# Patient Record
Sex: FEMALE | Race: WHITE | NOT HISPANIC OR LATINO | Employment: FULL TIME | URBAN - METROPOLITAN AREA
[De-identification: names, ages, dates, MRNs, and addresses within clinical notes are randomized per-mention and may not be internally consistent; named-entity substitution may affect disease eponyms.]

---

## 2017-05-04 ENCOUNTER — TRANSFERRED RECORDS (OUTPATIENT)
Dept: HEALTH INFORMATION MANAGEMENT | Facility: CLINIC | Age: 22
End: 2017-05-04

## 2017-05-04 LAB — PAP SMEAR - HIM PATIENT REPORTED: NEGATIVE

## 2018-07-03 ENCOUNTER — PRENATAL OFFICE VISIT (OUTPATIENT)
Dept: OBGYN | Facility: CLINIC | Age: 23
End: 2018-07-03

## 2018-07-03 ENCOUNTER — APPOINTMENT (OUTPATIENT)
Dept: OBGYN | Facility: CLINIC | Age: 23
End: 2018-07-03
Payer: COMMERCIAL

## 2018-07-03 DIAGNOSIS — Z34.00 PRENATAL CARE, FIRST PREGNANCY: Primary | ICD-10-CM

## 2018-07-03 PROCEDURE — 99207 ZZC NO CHARGE NURSE ONLY: CPT | Performed by: OBSTETRICS & GYNECOLOGY

## 2018-07-03 RX ORDER — CETIRIZINE HYDROCHLORIDE 10 MG/1
10 TABLET ORAL DAILY
COMMUNITY
End: 2021-02-26

## 2018-07-03 RX ORDER — PRENATAL VIT/IRON FUM/FOLIC AC 27MG-0.8MG
1 TABLET ORAL DAILY
COMMUNITY
Start: 2018-06-26 | End: 2022-02-14

## 2018-07-03 NOTE — MR AVS SNAPSHOT
"              After Visit Summary   7/3/2018    Dalila Guevara    MRN: [id number]           Patient Information     Date Of Birth          1995        Visit Information        Provider Department      7/3/2018 6:11 PM Maida Fair MD [facility]        Today's Diagnoses     Prenatal care, first pregnancy    -  1       Follow-ups after your visit        Your next 10 appointments already scheduled     Aug 02, 2018  9:30 AM CDT   New Prenatal with Maida Fair MD, [facility] 2   [facility]    [address] [address]   [phone]              Who to contact     If you have questions or need follow up information about today's clinic visit or your schedule please contact [facility] directly at [phone].  Normal or non-critical lab and imaging results will be communicated to you by MyChart, letter or phone within 4 business days after the clinic has received the results. If you do not hear from us within 7 days, please contact the clinic through MyChart or phone. If you have a critical or abnormal lab result, we will notify you by phone as soon as possible.  Submit refill requests through Plasmon or call your pharmacy and they will forward the refill request to us. Please allow 3 business days for your refill to be completed.          Additional Information About Your Visit        MyChart Information     Plasmon lets you send messages to your doctor, view your test results, renew your prescriptions, schedule appointments and more. To sign up, go to [email or web address] . Click on \"Log in\" on the left side of the screen, which will take you to the Welcome page. Then click on \"Sign up Now\" on the right side of the page.     You will be asked to enter the access code listed below, as well as some personal information. Please follow the directions to create your username and password.     Your access code " is: [email or web address]  Expires: 10/1/2018  6:20 PM     Your access code will  in 90 days. If you need help or a new code, please call your [place] clinic or [phone].        Care EveryWhere ID     This is your Care EveryWhere ID. This could be used by other organizations to access your [place] medical records  [id number]        Your Vitals Were     Last Period                   2018            Blood Pressure from Last 3 Encounters:   No data found for BP    Weight from Last 3 Encounters:   No data found for Wt              Today, you had the following     No orders found for display       Primary Care Provider    None Specified       No primary provider on file.        Equal Access to Services     [facility]: Hadii tim Kline, carlos lindo, lubna garvinmagriffin forte, giancarlo childs . So [facility] [phone].    ATENCIÓN: Si habla español, tiene a tripathi disposición servicios gratuitos de asistencia lingüística. Llame al [phone].    We comply with applicable federal civil rights laws and [place] laws. We do not discriminate on the basis of race, color, national origin, age, disability, sex, sexual orientation, or gender identity.            Thank you!     Thank you for choosing [facility]  for your care. Our goal is always to provide you with excellent care. Hearing back from our patients is one way we can continue to improve our services. Please take a few minutes to complete the written survey that you may receive in the mail after your visit with us. Thank you!             Your Updated Medication List - Protect others around you: Learn how to safely use, store and throw away your medicines at www.disposemymeds.org.          This list is accurate as of 7/3/18  6:20 PM.  Always use your most recent med list.                   Brand Name Dispense Instructions for use Diagnosis    cetirizine 10 MG tablet    zyrTEC     Take 10 mg by mouth daily         prenatal multivitamin plus iron 27-0.8 MG Tabs per tablet      Take 1 tablet by mouth daily

## 2018-08-02 ENCOUNTER — PRENATAL OFFICE VISIT (OUTPATIENT)
Dept: OBGYN | Facility: CLINIC | Age: 23
End: 2018-08-02
Payer: COMMERCIAL

## 2018-08-02 VITALS
HEART RATE: 105 BPM | WEIGHT: 256.6 LBS | DIASTOLIC BLOOD PRESSURE: 84 MMHG | BODY MASS INDEX: 40.27 KG/M2 | TEMPERATURE: 99 F | HEIGHT: 67 IN | SYSTOLIC BLOOD PRESSURE: 127 MMHG | RESPIRATION RATE: 20 BRPM

## 2018-08-02 DIAGNOSIS — Z34.01 ENCOUNTER FOR PRENATAL CARE IN FIRST TRIMESTER OF FIRST PREGNANCY: Primary | ICD-10-CM

## 2018-08-02 LAB
ABO + RH BLD: NORMAL
ABO + RH BLD: NORMAL
ALBUMIN UR-MCNC: NEGATIVE MG/DL
APPEARANCE UR: CLEAR
BILIRUB UR QL STRIP: NEGATIVE
BLD GP AB SCN SERPL QL: NORMAL
BLOOD BANK CMNT PATIENT-IMP: NORMAL
COLOR UR AUTO: YELLOW
ERYTHROCYTE [DISTWIDTH] IN BLOOD BY AUTOMATED COUNT: 13 % (ref 10–15)
GLUCOSE UR STRIP-MCNC: NEGATIVE MG/DL
HBV SURFACE AG SERPL QL IA: NONREACTIVE
HCT VFR BLD AUTO: 40.5 % (ref 35–47)
HGB BLD-MCNC: 13.5 G/DL (ref 11.7–15.7)
HGB UR QL STRIP: NEGATIVE
HIV 1+2 AB+HIV1 P24 AG SERPL QL IA: NONREACTIVE
KETONES UR STRIP-MCNC: NEGATIVE MG/DL
LEUKOCYTE ESTERASE UR QL STRIP: NEGATIVE
MCH RBC QN AUTO: 29.9 PG (ref 26.5–33)
MCHC RBC AUTO-ENTMCNC: 33.3 G/DL (ref 31.5–36.5)
MCV RBC AUTO: 90 FL (ref 78–100)
NITRATE UR QL: NEGATIVE
PH UR STRIP: 6.5 PH (ref 5–7)
PLATELET # BLD AUTO: 275 10E9/L (ref 150–450)
RBC # BLD AUTO: 4.51 10E12/L (ref 3.8–5.2)
SOURCE: NORMAL
SP GR UR STRIP: 1.01 (ref 1–1.03)
SPECIMEN EXP DATE BLD: NORMAL
UROBILINOGEN UR STRIP-ACNC: 0.2 EU/DL (ref 0.2–1)
WBC # BLD AUTO: 9.2 10E9/L (ref 4–11)

## 2018-08-02 PROCEDURE — 86850 RBC ANTIBODY SCREEN: CPT | Performed by: OBSTETRICS & GYNECOLOGY

## 2018-08-02 PROCEDURE — 86900 BLOOD TYPING SEROLOGIC ABO: CPT | Performed by: OBSTETRICS & GYNECOLOGY

## 2018-08-02 PROCEDURE — 87624 HPV HI-RISK TYP POOLED RSLT: CPT | Performed by: OBSTETRICS & GYNECOLOGY

## 2018-08-02 PROCEDURE — 86780 TREPONEMA PALLIDUM: CPT | Performed by: OBSTETRICS & GYNECOLOGY

## 2018-08-02 PROCEDURE — 87340 HEPATITIS B SURFACE AG IA: CPT | Performed by: OBSTETRICS & GYNECOLOGY

## 2018-08-02 PROCEDURE — 85027 COMPLETE CBC AUTOMATED: CPT | Performed by: OBSTETRICS & GYNECOLOGY

## 2018-08-02 PROCEDURE — 36415 COLL VENOUS BLD VENIPUNCTURE: CPT | Performed by: OBSTETRICS & GYNECOLOGY

## 2018-08-02 PROCEDURE — G0123 SCREEN CERV/VAG THIN LAYER: HCPCS | Performed by: OBSTETRICS & GYNECOLOGY

## 2018-08-02 PROCEDURE — 87086 URINE CULTURE/COLONY COUNT: CPT | Performed by: OBSTETRICS & GYNECOLOGY

## 2018-08-02 PROCEDURE — 87389 HIV-1 AG W/HIV-1&-2 AB AG IA: CPT | Performed by: OBSTETRICS & GYNECOLOGY

## 2018-08-02 PROCEDURE — 86762 RUBELLA ANTIBODY: CPT | Performed by: OBSTETRICS & GYNECOLOGY

## 2018-08-02 PROCEDURE — 76817 TRANSVAGINAL US OBSTETRIC: CPT | Performed by: OBSTETRICS & GYNECOLOGY

## 2018-08-02 PROCEDURE — 99207 ZZC FIRST OB VISIT: CPT | Performed by: OBSTETRICS & GYNECOLOGY

## 2018-08-02 PROCEDURE — 86901 BLOOD TYPING SEROLOGIC RH(D): CPT | Performed by: OBSTETRICS & GYNECOLOGY

## 2018-08-02 PROCEDURE — 87591 N.GONORRHOEAE DNA AMP PROB: CPT | Performed by: OBSTETRICS & GYNECOLOGY

## 2018-08-02 PROCEDURE — 87491 CHLMYD TRACH DNA AMP PROBE: CPT | Performed by: OBSTETRICS & GYNECOLOGY

## 2018-08-02 PROCEDURE — 81003 URINALYSIS AUTO W/O SCOPE: CPT | Performed by: OBSTETRICS & GYNECOLOGY

## 2018-08-02 NOTE — MR AVS SNAPSHOT
"              After Visit Summary   8/2/2018    Dalila Galvez    MRN: [id number]           Patient Information     Date Of Birth          1995        Visit Information        Provider Department      8/2/2018 9:30 AM Maida Fair MD; [facility] 2 [facility]        Today's Diagnoses     Encounter for prenatal care in first trimester of first pregnancy    -  1       Follow-ups after your visit        Your next 10 appointments already scheduled     Sep 06, 2018  9:15 AM CDT   ESTABLISHED PRENATAL with Maida Fair MD   [facility]    [address] [address]   [phone]              Who to contact     If you have questions or need follow up information about today's clinic visit or your schedule please contact [facility] directly at [phone].  Normal or non-critical lab and imaging results will be communicated to you by MyChart, letter or phone within 4 business days after the clinic has received the results. If you do not hear from us within 7 days, please contact the clinic through MyChart or phone. If you have a critical or abnormal lab result, we will notify you by phone as soon as possible.  Submit refill requests through Tenfoot or call your pharmacy and they will forward the refill request to us. Please allow 3 business days for your refill to be completed.          Additional Information About Your Visit        MyChart Information     Tenfoot lets you send messages to your doctor, view your test results, renew your prescriptions, schedule appointments and more. To sign up, go to [email or web address] . Click on \"Log in\" on the left side of the screen, which will take you to the Welcome page. Then click on \"Sign up Now\" on the right side of the page.     You will be asked to enter the access code listed below, as well as some personal information. Please follow the directions to create " "your username and password.     Your access code is: [email or web address]  Expires: 10/1/2018  6:20 PM     Your access code will  in 90 days. If you need help or a new code, please call your [place] clinic or [phone].        Care EveryWhere ID     This is your Care EveryWhere ID. This could be used by other organizations to access your [place] medical records  [id number]        Your Vitals Were     Pulse Temperature Respirations Height Last Period Breastfeeding?    105 99  F (37.2  C) (Tympanic) 20 5' 7.25\" (1.708 m) 2018 No    BMI (Body Mass Index)                   39.89 kg/m2            Blood Pressure from Last 3 Encounters:   18 127/84    Weight from Last 3 Encounters:   18 256 lb 9.6 oz (116.4 kg)              We Performed the Following     *UA reflex to Microscopic     ABO/Rh type and screen     CBC with platelets     Chlamydia trachomatis PCR     Hepatitis B surface antigen     HIV Antigen Antibody Combo     Neisseria gonorrhoeae PCR     Pap imaged thin layer screen with HPV - recommended age 30 - 65 years (select HPV order below)     Rubella Antibody IgG Quantitative     Treponema Abs w Reflex to RPR and Titer     Urine Culture Aerobic Bacterial     US OB <14 Weeks w Transvaginal Single        Primary Care Provider Fax #    Physician No Ref-Primary [phone]       No address on file        Equal Access to Services     YOMI ROSEN : Hadii aad ku hadasho Soomaali, waaxda luqadaha, qaybta kaalmada adeegyada, giancarlo garcia. So [facility] [phone].    ATENCIÓN: Si habla español, tiene a tripathi disposición servicios gratuitos de asistencia lingüística. Llame al [phone].    We comply with applicable federal civil rights laws and [place] laws. We do not discriminate on the basis of race, color, national origin, age, disability, sex, sexual orientation, or gender identity.            Thank you!     Thank you for choosing [facility]  for your care. " Our goal is always to provide you with excellent care. Hearing back from our patients is one way we can continue to improve our services. Please take a few minutes to complete the written survey that you may receive in the mail after your visit with us. Thank you!             Your Updated Medication List - Protect others around you: Learn how to safely use, store and throw away your medicines at www.disposemymeds.org.          This list is accurate as of 8/2/18 10:00 AM.  Always use your most recent med list.                   Brand Name Dispense Instructions for use Diagnosis    cetirizine 10 MG tablet    zyrTEC     Take 10 mg by mouth daily        prenatal multivitamin plus iron 27-0.8 MG Tabs per tablet      Take 1 tablet by mouth daily

## 2018-08-02 NOTE — NURSING NOTE
"Initial /84 (BP Location: Left arm, Patient Position: Chair, Cuff Size: Adult Large)  Pulse 105  Temp 99  F (37.2  C) (Tympanic)  Resp 20  Ht 5' 7.25\" (1.708 m)  Wt 256 lb 9.6 oz (116.4 kg)  LMP 06/02/2018  Breastfeeding? No  BMI 39.89 kg/m2 Estimated body mass index is 39.89 kg/(m^2) as calculated from the following:    Height as of this encounter: 5' 7.25\" (1.708 m).    Weight as of this encounter: 256 lb 9.6 oz (116.4 kg). .    Sommer Saini, [facility]    "

## 2018-08-02 NOTE — PROGRESS NOTES
"Dalila is a 23 year old  @ 8.5 weeks here for new ob visit.  Planned pregnancy.  No concerns.    FOB (): Shravan      ROS: Ten point review of systems was reviewed and negative except the above.  Current Issues include: nausea, mild  fatigue    OBhx: never pregnant  Gyne: Pap smears Normal  history of STD No STD history  Past Medical History:   Diagnosis Date     Chickenpox      Past Surgical History:   Procedure Laterality Date     NO HISTORY OF SURGERY       Patient Active Problem List    Diagnosis Date Noted     BMI 39.0-39.9,adult 2018     Priority: Medium     Prenatal care, first pregnancy 2018     Priority: Medium      No Known Allergies    Current Outpatient Prescriptions on File Prior to Visit:  cetirizine (ZYRTEC) 10 MG tablet Take 10 mg by mouth daily   Prenatal Vit-Fe Fumarate-FA (PRENATAL MULTIVITAMIN PLUS IRON) 27-0.8 MG TABS per tablet Take 1 tablet by mouth daily     No current facility-administered medications on file prior to visit.     Past Medical History of Father of Baby:   No significant medical history    Physical Exam: /84 (BP Location: Left arm, Patient Position: Chair, Cuff Size: Adult Large)  Pulse 105  Temp 99  F (37.2  C) (Tympanic)  Resp 20  Ht 5' 7.25\" (1.708 m)  Wt 256 lb 9.6 oz (116.4 kg)  LMP 2018  Breastfeeding? No  BMI 39.89 kg/m2  General: Well developed, well nourished female and Obese  Skin: Normal  HEENT: Normal  Neck: Supple,no adenopathy,thyroid normal  Chest: Clear  Heart: Regular rate, rhythm,No murmur, rub, gallop  Breasts: Not examined   Abdomen: Benign,Soft, flat, non-tender,No masses, organomegaly,No inguinal nodes,Bowel sounds normoactive   Extremities: Normal  Neurological: Normal   Perineum: Normal   Vulva: Normal  Vagina: Normal mucosa, no discharge  Cervix: Nulliparous, closed, mobile,  no discharge  Uterus: 6-8 weeks, Normal shape, position and consistency   Adnexa: Not palpable  Rectum: deferred, Normal without lesion or " mass   Bony Pelvis: Adequate     Transvaginal ultrasound was performed.  A viable intrauterine pregnancy was seen.  CRL consistent with 8 weeks, 6 days.  Fetal heart motion was visualized.    EDC by LMP: 3/9/19   EDC by sono:  3/8/19  Final EDC: 3/9/19    A/P 23 year old  at  8.5 weeks    1. Discussed physician coverage, tertiary support, diet, exercise, weight gain, schedule of visits, routine and indicated ultrasounds, and childbirth education.    2. Options for  testing for chromosomal and birth defects were discussed with the patient including nuchal lucency/blood marker testing in the first trimester and quad screening and/or Level 2 ultrasound in the second trimester.  We discussed that these are screening tests and not diagnostic tests and that false positives and negatives are a distinct possibility.  We discussed that follow up diagnostic testing would include chorionic villus sampling or amniocentesis depending on gestational age.    3. Prenatal labs, pap, GC, Chlamydia    4. Prenatal Vitamins    Maida Fair M.D.

## 2018-08-02 NOTE — LETTER
August 9, 2018    Dalila Galvez  [address]   [place] [address]    Dear Dalila,  We are happy to inform you that your PAP smear result from 8/2/18 is normal.  We are now able to do a follow up test on PAP smears. The DNA test is for HPV (Human Papilloma Virus). Cervical cancer is closely linked with certain types of HPV. Your results showed no evidence of high risk HPV.  Therefore we recommend you return in 3 years for your next pap smear.  You will still need to return to the clinic every year for an annual exam and other preventive tests.  Please contact the clinic at [phone] with any questions.  Sincerely,    Maida Fair MD/aftab

## 2018-08-03 LAB
BACTERIA SPEC CULT: NORMAL
C TRACH DNA SPEC QL NAA+PROBE: NEGATIVE
Lab: NORMAL
N GONORRHOEA DNA SPEC QL NAA+PROBE: NEGATIVE
RUBV IGG SERPL IA-ACNC: 141 IU/ML
SPECIMEN SOURCE: NORMAL
T PALLIDUM AB SER QL: NONREACTIVE

## 2018-08-06 LAB
COPATH REPORT: NORMAL
PAP: NORMAL

## 2018-08-07 ENCOUNTER — TELEPHONE (OUTPATIENT)
Dept: OBGYN | Facility: CLINIC | Age: 23
End: 2018-08-07

## 2018-08-08 LAB
FINAL DIAGNOSIS: NORMAL
HPV HR 12 DNA CVX QL NAA+PROBE: NEGATIVE
HPV16 DNA SPEC QL NAA+PROBE: NEGATIVE
HPV18 DNA SPEC QL NAA+PROBE: NEGATIVE
SPECIMEN DESCRIPTION: NORMAL
SPECIMEN SOURCE CVX/VAG CYTO: NORMAL

## 2018-09-06 ENCOUNTER — PRENATAL OFFICE VISIT (OUTPATIENT)
Dept: OBGYN | Facility: CLINIC | Age: 23
End: 2018-09-06
Payer: COMMERCIAL

## 2018-09-06 VITALS
RESPIRATION RATE: 18 BRPM | HEIGHT: 67 IN | BODY MASS INDEX: 39.87 KG/M2 | TEMPERATURE: 98.5 F | SYSTOLIC BLOOD PRESSURE: 120 MMHG | HEART RATE: 98 BPM | WEIGHT: 254 LBS | DIASTOLIC BLOOD PRESSURE: 81 MMHG

## 2018-09-06 DIAGNOSIS — O26.899 RH NEGATIVE, ANTEPARTUM: ICD-10-CM

## 2018-09-06 DIAGNOSIS — R30.0 DYSURIA: ICD-10-CM

## 2018-09-06 DIAGNOSIS — Z34.02 ENCOUNTER FOR PRENATAL CARE IN SECOND TRIMESTER OF FIRST PREGNANCY: Primary | ICD-10-CM

## 2018-09-06 DIAGNOSIS — N89.8 VAGINAL ITCHING: ICD-10-CM

## 2018-09-06 DIAGNOSIS — Z67.91 RH NEGATIVE, ANTEPARTUM: ICD-10-CM

## 2018-09-06 LAB
ALBUMIN UR-MCNC: NEGATIVE MG/DL
APPEARANCE UR: CLEAR
BACTERIA #/AREA URNS HPF: ABNORMAL /HPF
BILIRUB UR QL STRIP: NEGATIVE
COLOR UR AUTO: YELLOW
GLUCOSE UR STRIP-MCNC: NEGATIVE MG/DL
HGB UR QL STRIP: NEGATIVE
KETONES UR STRIP-MCNC: NEGATIVE MG/DL
LEUKOCYTE ESTERASE UR QL STRIP: ABNORMAL
MUCOUS THREADS #/AREA URNS LPF: PRESENT /LPF
NITRATE UR QL: NEGATIVE
NON-SQ EPI CELLS #/AREA URNS LPF: ABNORMAL /LPF
PH UR STRIP: 6 PH (ref 5–7)
RBC #/AREA URNS AUTO: ABNORMAL /HPF
SOURCE: ABNORMAL
SP GR UR STRIP: >1.03 (ref 1–1.03)
SPECIMEN SOURCE: ABNORMAL
UROBILINOGEN UR STRIP-ACNC: 0.2 EU/DL (ref 0.2–1)
WBC #/AREA URNS AUTO: ABNORMAL /HPF
WET PREP SPEC: ABNORMAL

## 2018-09-06 PROCEDURE — 81001 URINALYSIS AUTO W/SCOPE: CPT | Performed by: OBSTETRICS & GYNECOLOGY

## 2018-09-06 PROCEDURE — 99213 OFFICE O/P EST LOW 20 MIN: CPT | Performed by: OBSTETRICS & GYNECOLOGY

## 2018-09-06 PROCEDURE — 87210 SMEAR WET MOUNT SALINE/INK: CPT | Performed by: OBSTETRICS & GYNECOLOGY

## 2018-09-06 PROCEDURE — 87086 URINE CULTURE/COLONY COUNT: CPT | Performed by: OBSTETRICS & GYNECOLOGY

## 2018-09-06 RX ORDER — NITROFURANTOIN 25; 75 MG/1; MG/1
100 CAPSULE ORAL 2 TIMES DAILY
Qty: 14 CAPSULE | Refills: 0 | Status: SHIPPED | OUTPATIENT
Start: 2018-09-06 | End: 2018-10-04

## 2018-09-06 RX ORDER — MICONAZOLE NITRATE 2 %
1 CREAM WITH APPLICATOR VAGINAL AT BEDTIME
Qty: 45 G | Refills: 0 | Status: SHIPPED | OUTPATIENT
Start: 2018-09-06 | End: 2019-02-06

## 2018-09-06 RX ORDER — METRONIDAZOLE 500 MG/1
500 TABLET ORAL 2 TIMES DAILY
Qty: 14 TABLET | Refills: 0 | Status: SHIPPED | OUTPATIENT
Start: 2018-09-06 | End: 2018-10-04

## 2018-09-06 NOTE — MR AVS SNAPSHOT
"              After Visit Summary   9/6/2018    Dalila Galvez    MRN: [id number]           Patient Information     Date Of Birth          1995        Visit Information        Provider Department      9/6/2018 9:15 AM Maida Fair MD [facility]        Today's Diagnoses     Encounter for prenatal care in second trimester of first pregnancy    -  1    Rh negative, antepartum        Vaginal itching        Dysuria           Follow-ups after your visit        Who to contact     If you have questions or need follow up information about today's clinic visit or your schedule please contact [facility] directly at [phone].  Normal or non-critical lab and imaging results will be communicated to you by MyChart, letter or phone within 4 business days after the clinic has received the results. If you do not hear from us within 7 days, please contact the clinic through Quut or phone. If you have a critical or abnormal lab result, we will notify you by phone as soon as possible.  Submit refill requests through [employer] Corner or call your pharmacy and they will forward the refill request to us. Please allow 3 business days for your refill to be completed.          Additional Information About Your Visit        MyChart Information     [employer] Corner gives you secure access to your electronic health record. If you see a primary care provider, you can also send messages to your care team and make appointments. If you have questions, please call your primary care clinic.  If you do not have a primary care provider, please call [phone] and they will assist you.        Care EveryWhere ID     This is your Care EveryWhere ID. This could be used by other organizations to access your [place] medical records  [id number]        Your Vitals Were     Pulse Temperature Respirations Height Last Period Breastfeeding?    98 98.5  F (36.9  C) (Tympanic) 18 5' 7.25\" (1.708 m) 06/02/2018 No    BMI (Body Mass " Index)                   39.49 kg/m2            Blood Pressure from Last 3 Encounters:   09/06/18 120/81   08/02/18 127/84    Weight from Last 3 Encounters:   09/06/18 254 lb (115.2 kg)   08/02/18 256 lb 9.6 oz (116.4 kg)              We Performed the Following     UA with Microscopic     Urine Culture Aerobic Bacterial     Wet prep        Primary Care Provider Fax #    Physician No Ref-Primary [phone]       No address on file        Equal Access to Services     YOMI ROSEN : Hadii aad ku hadasho Soomaali, waaxda luqadaha, qaybta kaalmada adeegyada, waxay darvinin haykaten itzel childs . So [facility] [phone].    ATENCIÓN: Si habla español, tiene a tripathi disposición servicios gratuitos de asistencia lingüística. LlOhio[facility] [phone].    We comply with applicable federal civil rights laws and [place] laws. We do not discriminate on the basis of race, color, national origin, age, disability, sex, sexual orientation, or gender identity.            Thank you!     Thank you for choosing [facility]  for your care. Our goal is always to provide you with excellent care. Hearing back from our patients is one way we can continue to improve our services. Please take a few minutes to complete the written survey that you may receive in the mail after your visit with us. Thank you!             Your Updated Medication List - Protect others around you: Learn how to safely use, store and throw away your medicines at www.disposemymeds.org.          This list is accurate as of 9/6/18  9:37 AM.  Always use your most recent med list.                   Brand Name Dispense Instructions for use Diagnosis    cetirizine 10 MG tablet    zyrTEC     Take 10 mg by mouth daily        prenatal multivitamin plus iron 27-0.8 MG Tabs per tablet      Take 1 tablet by mouth daily

## 2018-09-06 NOTE — NURSING NOTE
"Initial /81 (BP Location: Left arm, Patient Position: Chair, Cuff Size: Adult Large)  Pulse 98  Temp 98.5  F (36.9  C) (Tympanic)  Resp 18  Ht 5' 7.25\" (1.708 m)  Wt 254 lb (115.2 kg)  LMP 06/02/2018  Breastfeeding? No  BMI 39.49 kg/m2 Estimated body mass index is 39.49 kg/(m^2) as calculated from the following:    Height as of this encounter: 5' 7.25\" (1.708 m).    Weight as of this encounter: 254 lb (115.2 kg). .    Sommer Saini, DERRICK    "

## 2018-09-06 NOTE — PROGRESS NOTES
"CC: Here for routine prenatal visit @ 13w5d   HPI: no cramping or bleeding.  Notes some burning and itching with urination    PE: /81 (BP Location: Left arm, Patient Position: Chair, Cuff Size: Adult Large)  Pulse 98  Temp 98.5  F (36.9  C) (Tympanic)  Resp 18  Ht 5' 7.25\" (1.708 m)  Wt 254 lb (115.2 kg)  LMP 06/02/2018  Breastfeeding? No  BMI 39.49 kg/m2   See OB flowsheet    Labs WNL  Rh negative    A/P G1 @ 13w5d normal pregnancy    1. Routine prenatal care.  Reviewed need for Rhogam mid-pregnancy  2. Dysuria, vaginal itching: will check wet prep and UA C&S    RTC 4 weeks.      Maida Fair M.D.    "

## 2018-09-06 NOTE — PROGRESS NOTES
Pt notified of below.  Pt reports understanding.  Pt does not have further questions or concerns.  Prescription's to [facility] in [place].    Laura Jeronimo   Ob/Gyn Clinic  RN

## 2018-09-07 LAB
BACTERIA SPEC CULT: NO GROWTH
Lab: NORMAL
SPECIMEN SOURCE: NORMAL

## 2018-10-04 ENCOUNTER — PRENATAL OFFICE VISIT (OUTPATIENT)
Dept: OBGYN | Facility: CLINIC | Age: 23
End: 2018-10-04
Payer: COMMERCIAL

## 2018-10-04 VITALS
HEIGHT: 67 IN | RESPIRATION RATE: 18 BRPM | DIASTOLIC BLOOD PRESSURE: 73 MMHG | TEMPERATURE: 98.9 F | WEIGHT: 255.2 LBS | SYSTOLIC BLOOD PRESSURE: 124 MMHG | HEART RATE: 97 BPM | BODY MASS INDEX: 40.06 KG/M2

## 2018-10-04 DIAGNOSIS — Z34.02 ENCOUNTER FOR PRENATAL CARE IN SECOND TRIMESTER OF FIRST PREGNANCY: Primary | ICD-10-CM

## 2018-10-04 DIAGNOSIS — Z82.79 FAMILY HISTORY OF CONGENITAL HEART DEFECT: ICD-10-CM

## 2018-10-04 PROCEDURE — 99207 ZZC PRENATAL VISIT: CPT | Performed by: OBSTETRICS & GYNECOLOGY

## 2018-10-04 NOTE — NURSING NOTE
"Chief Complaint   Patient presents with     Prenatal Care       Initial /73 (BP Location: Right arm, Patient Position: Chair, Cuff Size: Adult Large)  Pulse 97  Temp 98.9  F (37.2  C) (Tympanic)  Resp 18  Ht 5' 7.25\" (1.708 m)  Wt 255 lb 3.2 oz (115.8 kg)  LMP 06/02/2018  BMI 39.67 kg/m2 Estimated body mass index is 39.67 kg/(m^2) as calculated from the following:    Height as of this encounter: 5' 7.25\" (1.708 m).    Weight as of this encounter: 255 lb 3.2 oz (115.8 kg).  Medications and allergies reviewed.    Vinay RIZZO, CMA    "

## 2018-10-04 NOTE — MR AVS SNAPSHOT
After Visit Summary   10/4/2018    Dalila Galvez    MRN: [id number]           Patient Information     Date Of Birth          1995        Visit Information        Provider Department      10/4/2018 9:00 AM Celeste Gonzalez MD [facility]        Today's Diagnoses     Encounter for prenatal care in second trimester of first pregnancy    -  1    Family history of congenital heart defect           Follow-ups after your visit        Additional Services     MAT FETAL MED CTR REFERRAL-PREGNANCY       Body mass index is 39.67 kg/(m^2).    >> Patient may proceed with recommendations for further testing as directed by the Maternal Fetal Medicine Specialist >>    >> If requesting Fetal Echo: MFM will determine appropriate location for exam due to indication.    >> If requesting Lung Maturity Amnio:  If results indicate fetal lung maturity, induction or C/S is recommended within 36 hours.  Please schedule accordingly.     Dear Patient:   Please be aware that coverage of these services is subject to the terms and limitations of your health insurance plan.  Call member services at your health plan with any benefit or coverage questions.      Please bring the following to your appointment:    >>  Any x-rays, CTs or MRIs which have been performed.  Contact the facility where they were done to arrange for  prior to your scheduled appointment.  Any new CT, MRI or other procedures ordered by your specialist must be performed at a [facility] facility or coordinated by your clinic's referral office.  >>  List of current medications   >>  This referral request   >>  Any documents/labs given to you for this referral                  Future tests that were ordered for you today     Open Future Orders        Priority Expected Expires Ordered    US OB > 14 Weeks Complete Single Routine  12/4/2018 10/4/2018            Who to contact     If you have questions or need follow up  "information about today's clinic visit or your schedule please contact [facility] directly at [phone].  Normal or non-critical lab and imaging results will be communicated to you by [employer], letter or phone within 4 business days after the clinic has received the results. If you do not hear from us within 7 days, please contact the clinic through [employer] or phone. If you have a critical or abnormal lab result, we will notify you by phone as soon as possible.  Submit refill requests through Epocrates or call your pharmacy and they will forward the refill request to us. Please allow 3 business days for your refill to be completed.          Additional Information About Your Visit        [employer] Information     Epocrates gives you secure access to your electronic health record. If you see a primary care provider, you can also send messages to your care team and make appointments. If you have questions, please call your primary care clinic.  If you do not have a primary care provider, please call [phone] and they will assist you.        Care EveryWhere ID     This is your Care EveryWhere ID. This could be used by other organizations to access your [place] medical records  [id number]        Your Vitals Were     Pulse Temperature Respirations Height Last Period BMI (Body Mass Index)    97 98.9  F (37.2  C) (Tympanic) 18 5' 7.25\" (1.708 m) 06/02/2018 39.67 kg/m2       Blood Pressure from Last 3 Encounters:   10/04/18 124/73   09/06/18 120/81   08/02/18 127/84    Weight from Last 3 Encounters:   10/04/18 255 lb 3.2 oz (115.8 kg)   09/06/18 254 lb (115.2 kg)   08/02/18 256 lb 9.6 oz (116.4 kg)              We Performed the Following     MAT FETAL MED CTR REFERRAL-PREGNANCY        Primary Care Provider Fax #    Physician No Ref-Primary [phone]       No address on file        Equal Access to Services     YOMI ROSEN AH: carlos Decker, giancarlo marks " svetlana alcantaraamanda laSilasaan ah. So [facility] [phone].    ATENCIÓN: Si habla inge, tiene a tripathi disposición servicios gratuitos de asistencia lingüística. Timo al [phone].    We comply with applicable federal civil rights laws and [place] laws. We do not discriminate on the basis of race, color, national origin, age, disability, sex, sexual orientation, or gender identity.            Thank you!     Thank you for choosing [facility]  for your care. Our goal is always to provide you with excellent care. Hearing back from our patients is one way we can continue to improve our services. Please take a few minutes to complete the written survey that you may receive in the mail after your visit with us. Thank you!             Your Updated Medication List - Protect others around you: Learn how to safely use, store and throw away your medicines at www.disposemymeds.org.          This list is accurate as of 10/4/18  9:27 AM.  Always use your most recent med list.                   Brand Name Dispense Instructions for use Diagnosis    cetirizine 10 MG tablet    zyrTEC     Take 10 mg by mouth daily        prenatal multivitamin plus iron 27-0.8 MG Tabs per tablet      Take 1 tablet by mouth daily

## 2018-10-04 NOTE — PROGRESS NOTES
"CC: prenatal  S: no complaints.  /73 (BP Location: Right arm, Patient Position: Chair, Cuff Size: Adult Large)  Pulse 97  Temp 98.9  F (37.2  C) (Tympanic)  Resp 18  Ht 5' 7.25\" (1.708 m)  Wt 255 lb 3.2 oz (115.8 kg)  LMP 06/02/2018  BMI 39.67 kg/m2  A/P routine precautions  Declined the quad screen-discussed  F/u 4 weeks  Desires the flu vaccine but not today-she is moving  Didn't mention -FOB was born with a hole in his heart that later needed to be corrected. Plan for [facility] comprehensive US with fetal echo  Celeste Gonzalez MD    "

## 2018-10-11 ENCOUNTER — PRE VISIT (OUTPATIENT)
Dept: MATERNAL FETAL MEDICINE | Facility: CLINIC | Age: 23
End: 2018-10-11

## 2018-10-12 ENCOUNTER — HOSPITAL ENCOUNTER (OUTPATIENT)
Dept: ULTRASOUND IMAGING | Facility: CLINIC | Age: 23
Discharge: HOME | End: 2018-10-12
Attending: OBSTETRICS & GYNECOLOGY | Admitting: OBSTETRICS & GYNECOLOGY
Payer: COMMERCIAL

## 2018-10-12 ENCOUNTER — OFFICE VISIT (OUTPATIENT)
Dept: MATERNAL FETAL MEDICINE | Facility: CLINIC | Age: 23
End: 2018-10-12
Attending: OBSTETRICS & GYNECOLOGY
Payer: COMMERCIAL

## 2018-10-12 DIAGNOSIS — Z82.79 FAMILY HISTORY OF CONGENITAL HEART DEFECT: Primary | ICD-10-CM

## 2018-10-12 DIAGNOSIS — O26.90 PREGNANCY RELATED CONDITION, ANTEPARTUM: ICD-10-CM

## 2018-10-12 DIAGNOSIS — O35.9XX0 SUSPECTED FETAL ANOMALY, ANTEPARTUM, SINGLE OR UNSPECIFIED FETUS: ICD-10-CM

## 2018-10-12 PROCEDURE — 76811 OB US DETAILED SNGL FETUS: CPT

## 2018-10-12 PROCEDURE — 40000072 ZZH STATISTIC GENETIC COUNSELING, < 16 MIN: Mod: ZF | Performed by: GENETIC COUNSELOR, MS

## 2018-10-12 NOTE — PROGRESS NOTES
Please refer to ultrasound report under 'Imaging' Studies of 'Chart Review' tabs.    Adelfo Lucero M.D.

## 2018-10-12 NOTE — MR AVS SNAPSHOT
After Visit Summary   10/12/2018    Dalila Galvez    MRN: [id number]           Patient Information     Date Of Birth          1995        Visit Information        Provider Department      10/12/2018 10:00 AM Adelfo Lucero MD [facility] Maternal Fetal Medicine [facility]        Today's Diagnoses     Family history of congenital heart defect    -  1    Suspected fetal anomaly, antepartum, single or unspecified fetus           Follow-ups after your visit        Your next 10 appointments already scheduled     Oct 19, 2018  1:30 PM CDT   US OB > 14 WEEKS COMPLETE SINGLE with WYUS2   [facility] Ultrasound ([facility])    [address] [address]   [phone]           How do I prepare for my exam? (Food and drink instructions) Drink four 8-ounce glasses of fluid an hour before your exam. If you need to empty your bladder before your exam, try to release only a little urine. Then, drink another glass of fluid.  How do I prepare for my exam? (Other instructions) You may have up to two family members in the exam room. If you bring a small child, an adult must be there to care for him or her. No video or camera photography during the procedure.  What should I wear: Wear comfortable clothes.  How long does the exam take: Most ultrasounds take 30 to 60 minutes.  What should I bring: Bring a list of your medicines, including vitamins, minerals and over-the-counter drugs. It is safest to leave personal items at home.  Do I need a :  No  is needed.  What do I need to tell my doctor: Tell your doctor about any allergies you may have.  What should I do after the exam: No restrictions, You may resume normal activities.  What is this test: An ultrasound uses sound waves to make pictures of the body. Sound waves do not cause pain. The only discomfort may be the pressure of the wand against your skin or full bladder.  Who should I call with questions: If  you have any questions, please call the Imaging Department where you will have your exam. Directions, parking instructions, and other information is available on our website, [email or web address].            Nov 02, 2018 10:15 AM CDT   ESTABLISHED PRENATAL with Jenny Cedeño MD   [facility]    [address] [place]  [facility] [address]   [phone]            Nov 09, 2018 11:00 AM CST   M READ SCREEN FETAL EHCO Munoz with URMFMUSR2   [facility] Maternal Fetal Medicine Ultrasound - [place] ([facility])    [address]  [facility] [id number] [phone]            Nov 09, 2018 11:45 AM CST   M US COMPRE SINGLE F/U with URMFMUSR2   [facility] Maternal Fetal Medicine Ultrasound - [place] ([facility])    [address]  [facility] [id number] [phone]           Wear comfortable clothes and leave your valuables at home.            Nov 09, 2018 12:15 PM CST   Radiology MD with UR KAREN BISWAS   [facility] Maternal Fetal Medicine - [place] ([facility])    [address]  [facility] [address]   [phone]           Please arrive at the time given for your first appointment. This visit is used internally to schedule the physician's time during your ultrasound.              Future tests that were ordered for you today     Open Future Orders        Priority Expected Expires Ordered    M Read Screen Fetal Echo Single Routine  8/12/2019 10/12/2018    [facility] US Comprehensive Single F/U Routine  10/12/2019 10/12/2018    [facility] US Comprehensive Single Routine  8/12/2019 10/12/2018            Who to contact     If you have questions or need follow up information about today's clinic visit or your schedule please contact [facility] MATERNAL FETAL MEDICINE [facility] directly at [phone].  Normal or non-critical lab and imaging  results will be communicated to you by MyChart, letter or phone within 4 business days after the clinic has received the results. If you do not hear from us within 7 days, please contact the clinic through Instapage or phone. If you have a critical or abnormal lab result, we will notify you by phone as soon as possible.  Submit refill requests through Instapage or call your pharmacy and they will forward the refill request to us. Please allow 3 business days for your refill to be completed.          Additional Information About Your Visit        Instapage Information     Instapage gives you secure access to your electronic health record. If you see a primary care provider, you can also send messages to your care team and make appointments. If you have questions, please call your primary care clinic.  If you do not have a primary care provider, please call [phone] and they will assist you.        Care EveryWhere ID     This is your Care EveryWhere ID. This could be used by other organizations to access your [facility] medical records  [id number]        Your Vitals Were     Last Period                   06/02/2018            Blood Pressure from Last 3 Encounters:   10/04/18 124/73   09/06/18 120/81   08/02/18 127/84    Weight from Last 3 Encounters:   10/04/18 115.8 kg (255 lb 3.2 oz)   09/06/18 115.2 kg (254 lb)   08/02/18 116.4 kg (256 lb 9.6 oz)               Primary Care Provider Fax #    Physician No Ref-Primary [phone]       No address on file        Equal Access to Services     YOMI ROSEN AH: Hadii tim velardeo Sojin, waaxda luqadaha, qaybta kaalmada adeegyada, giancarlo garcia. So [facility] [phone].    ATENCIÓN: Si habla español, tiene a tripathi disposición servicios gratuitos de asistencia lingüística. Llame al [phone].    We comply with applicable federal civil rights laws and [place] laws. We do not discriminate on the basis of race, color, national origin, age, disability,  sex, sexual orientation, or gender identity.            Thank you!     Thank you for choosing [facility]  for your care. Our goal is always to provide you with excellent care. Hearing back from our patients is one way we can continue to improve our services. Please take a few minutes to complete the written survey that you may receive in the mail after your visit with us. Thank you!             Your Updated Medication List - Protect others around you: Learn how to safely use, store and throw away your medicines at www.disposemymeds.org.          This list is accurate as of 10/12/18 11:20 AM.  Always use your most recent med list.                   Brand Name Dispense Instructions for use Diagnosis    cetirizine 10 MG tablet    zyrTEC     Take 10 mg by mouth daily        prenatal multivitamin plus iron 27-0.8 MG Tabs per tablet      Take 1 tablet by mouth daily

## 2018-10-12 NOTE — PROGRESS NOTES
"[facility] [place]  Genetic Counseling Consult    Patient: Dalila Galvez YOB: 1995       Dalila Galvez was seen with her , Shravan, at the [facility] [place] for genetic consultation as part of her appointment for comprehensive ultrasound.  The indication for genetic counseling is family history of a congenital heart defect.       Impression/Plan:   1. Dalila's  has a history of a congenital heart defect that required surgical repair.    2. Dalila had a comprehensive (level II) ultrasound today, which was normal, with suboptimal views of the heart.  Please see the ultrasound report for further details.    3. The patient declines genetic amniocentesis and maternal serum screening today.    Pregnancy History:   /Parity:    Age at Delivery: 23 year old  NOA: 3/9/2019, by Last Menstrual Period  Gestational Age: 18w6d    No significant complications or exposures were reported in the current pregnancy.    Medical History:   Dalila s reported medical history is not expected to impact pregnancy management or risks to fetal development.       Family History:   A three-generation pedigree was obtained, and is scanned under the  Media  tab.   The following significant findings were reported by Dalila and Shravan:    Shravan reports that he was born with a \"hold in his heart.\" He believes it may have been in his left ventricle, but is unsure about the specifics. Dalila reports that she has a maternal first cousin once removed who was born with a \"hole in his heart.\" Congenital heart defects are common, occurring in 5 to 10 per 1000 live births. One type of congenital heart defect commonly referred to as a   hole in the heart  is a patent foramen ovale, however there are many different abnormalities that can be referred to as a \"hole in the heart\". The specific recurrence risk for first degree relatives differs according to the type " of congenital heart defect and if there is an associated genetic syndrome present. Without a known subtype of congenital heart defect in Shravan it is difficult to provide a recurrence risk to this pregnancy. In general, studies show that the overall risk contributed by a positive family history of a congenital heart defect in 1st degree relatives for the same defect is  8.15% whereas the recurrence risk for a different heart defect is 2.68%. We reviewed that today's ultrasound will include a look at the baby's heart to determine if there is suspicion for a heart defect. If there is, a fetal echocardiogram will likely be recommended.     Shravan reports that he has a maternal first cousin with spina bifida. Spina bifida is a condition that occurs when the neural tube does not close completely during the first few weeks of development which can result in abnormalities in spine formation. The cause of this condition is likely multifactorial, including multiple genetic and environmental factors.    Otherwise, the reported family history is negative for multiple miscarriages, stillbirths, birth defects, intellectual disability, known genetic conditions, and consanguinity.       Carrier Screening:   The patient reports that she and the father of the pregnancy have  ancestry:      Cystic fibrosis is an autosomal recessive genetic condition that occurs with increased frequency in individuals of  ancestry and carrier screening for this condition is available.  In addition,  screening in the [facility] includes cystic fibrosis.      Expanded carrier screening for mutations in a large panel of genes associated with autosomal recessive conditions including cystic fibrosis, spinal muscular atrophy, and others, is now available.      Carrier screening was not discussed today.       Risk Assessment:   We explained that the risk for fetal chromosome abnormalities increases with maternal age. We discussed  specific features of common chromosome abnormalities, including Down syndrome, trisomy 13, trisomy 18, and sex chromosome trisomies.      - At age 23 at midtrimester, the risk to have a baby with Down syndrome is 1 in 1040.    - At age 23 at midtrimester, the risk to have a baby with any chromosome abnormality is 1 in 520.       Dalila did not have maternal serum screening earlier in pregnancy.         Testing Options:   We discussed the following options:   Comprehensive (Level II) ultrasound: Detailed ultrasound performed between 18-22 weeks gestation to screen for major birth defects and markers for aneuploidy.    We reviewed the benefits and limitations of this testing.  Screening tests provide a risk assessment specific to the pregnancy for certain fetal chromosome abnormalities, but cannot definitively diagnose or exclude a fetal chromosome abnormality.  Follow-up genetic counseling and consideration of diagnostic testing is recommended with any abnormal screening result.     Diagnostic tests carry inherent risks- including risk of miscarriage- that require careful consideration.  These tests can detect fetal chromosome abnormalities with greater than 99% certainty.  Results can be compromised by maternal cell contamination or mosaicism, and are limited by the resolution of cytogenetic G-banding technology.  There is no screening nor diagnostic test that can detect all forms of birth defects or mental disability.     It was a pleasure to be involved with Dalila s care. Face-to-face time of the meeting was 15 minutes.    Bess Rivas [facility]  Certified Genetic Counselor  [phone]    Patient seen, evaluated and discussed with the Genetic Counseling Intern. I have verified the content of the note, which accurately reflects my assessment of the patient and the plan of care.  Supervising Genetic Counselor  Lissett Brunson MS, [facility]  Maternal Fetal Medicine  [facility]  Phone:  [phone]  Email: [email or web address]

## 2018-10-12 NOTE — MR AVS SNAPSHOT
After Visit Summary   10/12/2018    Dalila Galvez    MRN: [id number]           Patient Information     Date Of Birth          1995        Visit Information        Provider Department      10/12/2018 8:45 AM UR GEN COUNSELOR 2 [facility]        Today's Diagnoses     Family history of congenital heart defect    -  1    Pregnancy related condition, antepartum           Follow-ups after your visit        Your next 10 appointments already scheduled     Oct 19, 2018  1:30 PM CDT   US OB > 14 WEEKS COMPLETE SINGLE with WYUS2   [facility] ([facility])    [address]  [facility] [address]   [phone]           How do I prepare for my exam? (Food and drink instructions) Drink four 8-ounce glasses of fluid an hour before your exam. If you need to empty your bladder before your exam, try to release only a little urine. Then, drink another glass of fluid.  How do I prepare for my exam? (Other instructions) You may have up to two family members in the exam room. If you bring a small child, an adult must be there to care for him or her. No video or camera photography during the procedure.  What should I wear: Wear comfortable clothes.  How long does the exam take: Most ultrasounds take 30 to 60 minutes.  What should I bring: Bring a list of your medicines, including vitamins, minerals and over-the-counter drugs. It is safest to leave personal items at home.  Do I need a :  No  is needed.  What do I need to tell my doctor: Tell your doctor about any allergies you may have.  What should I do after the exam: No restrictions, You may resume normal activities.  What is this test: An ultrasound uses sound waves to make pictures of the body. Sound waves do not cause pain. The only discomfort may be the pressure of the wand against your skin or full bladder.  Who should I call with questions: If you have any questions,  please call the Imaging Department where you will have your exam. Directions, parking instructions, and other information is available on our website, [email or web address].            Nov 02, 2018 10:15 AM CDT   ESTABLISHED PRENATAL with Jenny Cedeño MD   [facility]    [address] [facility] [address]   [phone]            Nov 09, 2018 11:00 AM CST   M READ SCREEN FETAL EHCO Munoz with URMFMUSR2   [facility] Maternal Fetal Medicine Ultrasound - [place] ([facility])    [address]  [facility] [id number] [phone]            Nov 09, 2018 11:45 AM CST   M US COMPRE SINGLE F/U with URMFMUSR2   [facility] Maternal Fetal Medicine Ultrasound - [place] ([facility])    [address]  [facility] [id number] [phone]           Wear comfortable clothes and leave your valuables at home.            Nov 09, 2018 12:15 PM CST   Radiology MD with UR KAREN BISWAS   [facility] Maternal Fetal Medicine - [place] ([facility])    [address]  [facility] [address]   [phone]           Please arrive at the time given for your first appointment. This visit is used internally to schedule the physician's time during your ultrasound.              Future tests that were ordered for you today     Open Future Orders        Priority Expected Expires Ordered    [facility] Read Screen Fetal Echo Single Routine  8/12/2019 10/12/2018    [facility] US Comprehensive Single F/U Routine  10/12/2019 10/12/2018    [facility] US Comprehensive Single Routine  8/12/2019 10/12/2018            Who to contact     If you have questions or need follow up information about today's clinic visit or your schedule please contact [facility] MATERNAL FETAL MEDICINE [facility] directly at [phone].  Normal or non-critical lab and imaging results will be communicated  to you by TurnTidehart, letter or phone within 4 business days after the clinic has received the results. If you do not hear from us within 7 days, please contact the clinic through InteliCloud or phone. If you have a critical or abnormal lab result, we will notify you by phone as soon as possible.  Submit refill requests through InteliCloud or call your pharmacy and they will forward the refill request to us. Please allow 3 business days for your refill to be completed.          Additional Information About Your Visit        TurnTideharXenith Information     InteliCloud gives you secure access to your electronic health record. If you see a primary care provider, you can also send messages to your care team and make appointments. If you have questions, please call your primary care clinic.  If you do not have a primary care provider, please call [phone] and they will assist you.        Care EveryWhere ID     This is your Care EveryWhere ID. This could be used by other organizations to access your [place] medical records  [id number]        Your Vitals Were     Last Period                   06/02/2018            Blood Pressure from Last 3 Encounters:   10/04/18 124/73   09/06/18 120/81   08/02/18 127/84    Weight from Last 3 Encounters:   10/04/18 115.8 kg (255 lb 3.2 oz)   09/06/18 115.2 kg (254 lb)   08/02/18 116.4 kg (256 lb 9.6 oz)              We Performed the Following     [facility] Genetic Counseling        Primary Care Provider Fax #    Physician No Ref-Primary [phone]       No address on file        Equal Access to Services     YOMI ROSEN : Hadii tim velardeo Sojin, waaxda luqadaha, qaybta kaalmada jann, giancarlo childs . So [facility] [phone].    ATENCIÓN: Si habla español, tiene a tripathi disposición servicios gratuitos de asistencia lingüística. Llame al [phone].    We comply with applicable federal civil rights laws and [place] laws. We do not discriminate on the basis of race, color,  national origin, age, disability, sex, sexual orientation, or gender identity.            Thank you!     Thank you for choosing [facility]  for your care. Our goal is always to provide you with excellent care. Hearing back from our patients is one way we can continue to improve our services. Please take a few minutes to complete the written survey that you may receive in the mail after your visit with us. Thank you!             Your Updated Medication List - Protect others around you: Learn how to safely use, store and throw away your medicines at www.disposemymeds.org.          This list is accurate as of 10/12/18 11:22 AM.  Always use your most recent med list.                   Brand Name Dispense Instructions for use Diagnosis    cetirizine 10 MG tablet    zyrTEC     Take 10 mg by mouth daily        prenatal multivitamin plus iron 27-0.8 MG Tabs per tablet      Take 1 tablet by mouth daily

## 2018-11-02 ENCOUNTER — PRENATAL OFFICE VISIT (OUTPATIENT)
Dept: OBGYN | Facility: CLINIC | Age: 23
End: 2018-11-02
Payer: COMMERCIAL

## 2018-11-02 VITALS
HEART RATE: 97 BPM | HEIGHT: 67 IN | TEMPERATURE: 98.2 F | RESPIRATION RATE: 18 BRPM | DIASTOLIC BLOOD PRESSURE: 77 MMHG | BODY MASS INDEX: 41.44 KG/M2 | SYSTOLIC BLOOD PRESSURE: 142 MMHG | WEIGHT: 264 LBS

## 2018-11-02 DIAGNOSIS — Z23 NEED FOR PROPHYLACTIC VACCINATION AND INOCULATION AGAINST INFLUENZA: ICD-10-CM

## 2018-11-02 DIAGNOSIS — Z34.02 ENCOUNTER FOR PRENATAL CARE IN SECOND TRIMESTER OF FIRST PREGNANCY: Primary | ICD-10-CM

## 2018-11-02 PROCEDURE — 99207 ZZC PRENATAL VISIT: CPT | Performed by: OBSTETRICS & GYNECOLOGY

## 2018-11-02 PROCEDURE — 90471 IMMUNIZATION ADMIN: CPT | Performed by: OBSTETRICS & GYNECOLOGY

## 2018-11-02 PROCEDURE — 90686 IIV4 VACC NO PRSV 0.5 ML IM: CPT | Performed by: OBSTETRICS & GYNECOLOGY

## 2018-11-02 NOTE — PROGRESS NOTES

## 2018-11-02 NOTE — MR AVS SNAPSHOT
After Visit Summary   11/2/2018    Dalila Galvez    MRN: [id number]           Patient Information     Date Of Birth          1995        Visit Information        Provider Department      11/2/2018 10:15 AM Jenny Cedeño MD [facility]        Today's Diagnoses     Encounter for prenatal care in second trimester of first pregnancy    -  1       Follow-ups after your visit        Your next 10 appointments already scheduled     Nov 09, 2018 11:00 AM CST   KAREN READ SCREEN FETAL EHCO Munoz with [id number]   [facility])    [address]  [facility] [address] [phone]            Nov 09, 2018 11:45 AM ANJELICA JONES US COMPRE SINGLE F/U with [id number]   [facility])    [address]  [facility] [id number] [phone]           Wear comfortable clothes and leave your valuables at home.            Nov 09, 2018 12:15 PM CST   Radiology MD with UR KAREN BISWAS   [facility])    [address]  [facility] [address]   [phone]           Please arrive at the time given for your first appointment. This visit is used internally to schedule the physician's time during your ultrasound.              Future tests that were ordered for you today     Open Future Orders        Priority Expected Expires Ordered    Glucose tolerance, gest screen, 1 hour Routine  1/2/2019 11/2/2018    CBC with platelets Routine  1/2/2019 11/2/2018    Treponema Abs w Reflex to RPR and Titer Routine  1/2/2019 11/2/2018            Who to contact     If you have questions or need follow up information about today's clinic visit or your schedule please contact [facility] directly at [phone].  Normal  "or non-critical lab and imaging results will be communicated to you by MyChart, letter or phone within 4 business days after the clinic has received the results. If you do not hear from us within 7 days, please contact the clinic through [email or web address] or phone. If you have a critical or abnormal lab result, we will notify you by phone as soon as possible.  Submit refill requests through [employer] or call your pharmacy and they will forward the refill request to us. Please allow 3 business days for your refill to be completed.          Additional Information About Your Visit        [employer] Information     [employer] gives you secure access to your electronic health record. If you see a primary care provider, you can also send messages to your care team and make appointments. If you have questions, please call your primary care clinic.  If you do not have a primary care provider, please call [phone] and they will assist you.        Care EveryWhere ID     This is your Care EveryWhere ID. This could be used by other organizations to access your [facility] medical records  [id number]        Your Vitals Were     Pulse Temperature Respirations Height Last Period BMI (Body Mass Index)    97 98.2  F (36.8  C) (Tympanic) 18 5' 7.25\" (1.708 m) 06/02/2018 41.04 kg/m2       Blood Pressure from Last 3 Encounters:   11/02/18 142/77   10/04/18 124/73   09/06/18 120/81    Weight from Last 3 Encounters:   11/02/18 264 lb (119.7 kg)   10/04/18 255 lb 3.2 oz (115.8 kg)   09/06/18 254 lb (115.2 kg)               Primary Care Provider Fax #    Physician No Ref-Primary [phone]       No address on file        Equal Access to Services     YOMI ROSEN : Hadii tim Kline, carlos lindo, qagiancarlo burrell. So [facility] [phone].    ATENCIÓN: Si habla español, tiene a tripathi disposición servicios gratuitos de asistencia lingüística. Llame al [phone].    We comply with applicable " federal civil rights laws and [place] laws. We do not discriminate on the basis of race, color, national origin, age, disability, sex, sexual orientation, or gender identity.            Thank you!     Thank you for choosing [facility]  for your care. Our goal is always to provide you with excellent care. Hearing back from our patients is one way we can continue to improve our services. Please take a few minutes to complete the written survey that you may receive in the mail after your visit with us. Thank you!             Your Updated Medication List - Protect others around you: Learn how to safely use, store and throw away your medicines at www.disposemymeds.org.          This list is accurate as of 11/2/18 10:26 AM.  Always use your most recent med list.                   Brand Name Dispense Instructions for use Diagnosis    cetirizine 10 MG tablet    zyrTEC     Take 10 mg by mouth daily        prenatal multivitamin plus iron 27-0.8 MG Tabs per tablet      Take 1 tablet by mouth daily

## 2018-11-02 NOTE — PROGRESS NOTES
"CC: Here for routine prenatal visit @ 21w6d   HPI:  Sonogram says it's a BOY; feeling well; no headache; flu shot given today    PE: /77 (BP Location: Right arm, Patient Position: Chair, Cuff Size: Adult Large)  Pulse 97  Temp 98.2  F (36.8  C) (Tympanic)  Resp 18  Ht 5' 7.25\" (1.708 m)  Wt 264 lb (119.7 kg)  LMP 06/02/2018  BMI 41.04 kg/m2   See OB flowsheet      A:  1. Encounter for prenatal care in second trimester of first pregnancy    - Glucose tolerance, gest screen, 1 hour; Future  - CBC with platelets; Future  - Treponema Abs w Reflex to RPR and Titer; Future      Routine prenatal care  RTC 4 weeks.      Jenny Cedeño M.D.     "

## 2018-11-09 ENCOUNTER — HOSPITAL ENCOUNTER (OUTPATIENT)
Dept: ULTRASOUND IMAGING | Facility: CLINIC | Age: 23
End: 2018-11-09
Attending: OBSTETRICS & GYNECOLOGY
Payer: COMMERCIAL

## 2018-11-09 ENCOUNTER — HOSPITAL ENCOUNTER (OUTPATIENT)
Dept: ULTRASOUND IMAGING | Facility: CLINIC | Age: 23
Discharge: HOME | End: 2018-11-09
Attending: OBSTETRICS & GYNECOLOGY | Admitting: OBSTETRICS & GYNECOLOGY
Payer: COMMERCIAL

## 2018-11-09 ENCOUNTER — OFFICE VISIT (OUTPATIENT)
Dept: MATERNAL FETAL MEDICINE | Facility: CLINIC | Age: 23
End: 2018-11-09
Attending: OBSTETRICS & GYNECOLOGY
Payer: COMMERCIAL

## 2018-11-09 DIAGNOSIS — Z82.79 FAMILY HISTORY OF CONGENITAL HEART DEFECT: ICD-10-CM

## 2018-11-09 DIAGNOSIS — O99.210 OBESITY IN PREGNANCY WITH ANTEPARTUM COMPLICATION: Primary | ICD-10-CM

## 2018-11-09 DIAGNOSIS — O35.9XX0 SUSPECTED FETAL ANOMALY, ANTEPARTUM, SINGLE OR UNSPECIFIED FETUS: ICD-10-CM

## 2018-11-09 PROCEDURE — 76827 ECHO EXAM OF FETAL HEART: CPT

## 2018-11-09 PROCEDURE — 76816 OB US FOLLOW-UP PER FETUS: CPT

## 2018-11-09 NOTE — PROGRESS NOTES
Please see the imaging tab for details of the ultrasound performed today.    Elda Wilhelm MD  Specialist in Maternal-Fetal Medicine

## 2018-11-09 NOTE — MR AVS SNAPSHOT
After Visit Summary   11/9/2018    Dalila Galvez    MRN: [id number]           Patient Information     Date Of Birth          1995        Visit Information        Provider Department      11/9/2018 12:15 PM Elda Wilhelm MD [facility] Maternal Fetal Medicine [facility]        Today's Diagnoses     Obesity in pregnancy with antepartum complication    -  1       Follow-ups after your visit        Your next 10 appointments already scheduled     Nov 09, 2018 12:15 PM CST   Radiology MD with Elda Wilhelm MD   [facility] Maternal Fetal Medicine [facility] ([facility])    [address]  [facility] [place] [address]   [phone]           Please arrive at the time given for your first appointment. This visit is used internally to schedule the physician's time during your ultrasound.            Nov 30, 2018  9:45 AM CST   LAB with [facility] ([facility])    [address] [facility] [address] [phone]           Please do not eat 10-12 hours before your appointment if you are coming in fasting for labs on lipids, cholesterol, or glucose (sugar). This does not apply to pregnant women. Water, hot tea and black coffee (with nothing added) are okay. Do not drink other fluids, diet soda or chew gum.            Nov 30, 2018 10:00 AM CST   ESTABLISHED PRENATAL with Francis Somers MD   [facility] ([facility])    [address] [facility] [address] [phone]              Who to contact     If you have questions or need follow up information about today's clinic visit or your schedule please contact [facility]ERNAL FETAL [facility] directly at [phone].  Normal or non-critical lab and imaging results will be communicated to you by MyChart, letter or phone within 4 business days after the clinic has received the results. If you do not hear from us  within 7 days, please contact the clinic through [employer] or phone. If you have a critical or abnormal lab result, we will notify you by phone as soon as possible.  Submit refill requests through [employer] or call your pharmacy and they will forward the refill request to us. Please allow 3 business days for your refill to be completed.          Additional Information About Your Visit        [employer]hart Information     [employer] gives you secure access to your electronic health record. If you see a primary care provider, you can also send messages to your care team and make appointments. If you have questions, please call your primary care clinic.  If you do not have a primary care provider, please call [phone] and they will assist you.        Care EveryWhere ID     This is your Care EveryWhere ID. This could be used by other organizations to access your [facility] medical records  [id number]        Your Vitals Were     Last Period                   06/02/2018            Blood Pressure from Last 3 Encounters:   11/02/18 142/77   10/04/18 124/73   09/06/18 120/81    Weight from Last 3 Encounters:   11/02/18 264 lb (119.7 kg)   10/04/18 255 lb 3.2 oz (115.8 kg)   09/06/18 254 lb (115.2 kg)              Today, you had the following     No orders found for display       Primary Care Provider Fax #    Physician No Ref-Primary [phone]       No address on file        Equal Access to Services     YOMI ROSEN : Hadii aad ku hadasho Soomaali, waaxda luqadaha, qaybta kaalmada adeaurelianoyada, giancarlo childs . So [facility] [phone].    ATENCIÓN: Si habla español, tiene a tripathi disposición servicios gratuitos de asistencia lingüística. Llame al [phone].    We comply with applicable federal civil rights laws and [place] laws. We do not discriminate on the basis of race, color, national origin, age, disability, sex, sexual orientation, or gender identity.            Thank you!     Thank you for choosing  [facility]  for your care. Our goal is always to provide you with excellent care. Hearing back from our patients is one way we can continue to improve our services. Please take a few minutes to complete the written survey that you may receive in the mail after your visit with us. Thank you!             Your Updated Medication List - Protect others around you: Learn how to safely use, store and throw away your medicines at www.disposemymeds.org.          This list is accurate as of 11/9/18 11:57 AM.  Always use your most recent med list.                   Brand Name Dispense Instructions for use Diagnosis    cetirizine 10 MG tablet    zyrTEC     Take 10 mg by mouth daily        prenatal multivitamin plus iron 27-0.8 MG Tabs per tablet      Take 1 tablet by mouth daily

## 2018-11-30 ENCOUNTER — PRENATAL OFFICE VISIT (OUTPATIENT)
Dept: OBGYN | Facility: CLINIC | Age: 23
End: 2018-11-30
Payer: COMMERCIAL

## 2018-11-30 VITALS
SYSTOLIC BLOOD PRESSURE: 127 MMHG | HEIGHT: 67 IN | WEIGHT: 271.4 LBS | TEMPERATURE: 98.2 F | BODY MASS INDEX: 42.6 KG/M2 | HEART RATE: 88 BPM | DIASTOLIC BLOOD PRESSURE: 68 MMHG | RESPIRATION RATE: 16 BRPM

## 2018-11-30 DIAGNOSIS — Z34.02 ENCOUNTER FOR SUPERVISION OF NORMAL FIRST PREGNANCY IN SECOND TRIMESTER: Primary | ICD-10-CM

## 2018-11-30 DIAGNOSIS — Z34.02 ENCOUNTER FOR PRENATAL CARE IN SECOND TRIMESTER OF FIRST PREGNANCY: ICD-10-CM

## 2018-11-30 LAB
ERYTHROCYTE [DISTWIDTH] IN BLOOD BY AUTOMATED COUNT: 13.2 % (ref 10–15)
GLUCOSE 1H P 50 G GLC PO SERPL-MCNC: 77 MG/DL (ref 60–129)
HCT VFR BLD AUTO: 37.7 % (ref 35–47)
HGB BLD-MCNC: 12.2 G/DL (ref 11.7–15.7)
MCH RBC QN AUTO: 29.8 PG (ref 26.5–33)
MCHC RBC AUTO-ENTMCNC: 32.4 G/DL (ref 31.5–36.5)
MCV RBC AUTO: 92 FL (ref 78–100)
PLATELET # BLD AUTO: 279 10E9/L (ref 150–450)
RBC # BLD AUTO: 4.09 10E12/L (ref 3.8–5.2)
WBC # BLD AUTO: 13.8 10E9/L (ref 4–11)

## 2018-11-30 PROCEDURE — 82950 GLUCOSE TEST: CPT | Performed by: OBSTETRICS & GYNECOLOGY

## 2018-11-30 PROCEDURE — 36415 COLL VENOUS BLD VENIPUNCTURE: CPT | Performed by: OBSTETRICS & GYNECOLOGY

## 2018-11-30 PROCEDURE — 86780 TREPONEMA PALLIDUM: CPT | Performed by: OBSTETRICS & GYNECOLOGY

## 2018-11-30 PROCEDURE — 99207 ZZC PRENATAL VISIT: CPT | Performed by: OBSTETRICS & GYNECOLOGY

## 2018-11-30 PROCEDURE — 85027 COMPLETE CBC AUTOMATED: CPT | Performed by: OBSTETRICS & GYNECOLOGY

## 2018-11-30 NOTE — MR AVS SNAPSHOT
After Visit Summary   11/30/2018    Dalila Galvze    MRN: [id number]           Patient Information     Date Of Birth          1995        Visit Information        Provider Department      11/30/2018 10:00 AM Francis Somers MD [facility]        Today's Diagnoses     Encounter for supervision of normal first pregnancy in second trimester    -  1       Follow-ups after your visit        Follow-up notes from your care team     Return in about 4 weeks (around 12/28/2018).      Your next 10 appointments already scheduled     Jan 02, 2019  3:00 PM CST   ESTABLISHED PRENATAL with Jenny Cedeño MD   [facility]    [address] [facility] [address]   [phone]            Jan 18, 2019  3:00 PM CST   ESTABLISHED PRENATAL with Maida Fair MD   [facility])    [address] [address]   [phone]              Who to contact     If you have questions or need follow up information about today's clinic visit or your schedule please contact [facility] directly at [phone].  Normal or non-critical lab and imaging results will be communicated to you by MyChart, letter or phone within 4 business days after the clinic has received the results. If you do not hear from us within 7 days, please contact the clinic through MyChart or phone. If you have a critical or abnormal lab result, we will notify you by phone as soon as possible.  Submit refill requests through Simplee or call your pharmacy and they will forward the refill request to us. Please allow 3 business days for your refill to be completed.          Additional Information About Your Visit        MyChart Information     [employer] gives you secure access to your electronic health record. If you see a primary care provider, you can also send messages to your care team and make  "appointments. If you have questions, please call your primary care clinic.  If you do not have a primary care provider, please call [phone] and they will assist you.        Care EveryWhere ID     This is your Care EveryWhere ID. This could be used by other organizations to access your [facility] medical records  [id number]        Your Vitals Were     Pulse Temperature Respirations Height Last Period BMI (Body Mass Index)    88 98.2  F (36.8  C) 16 1.708 m (5' 7.25\") 06/02/2018 42.19 kg/m2       Blood Pressure from Last 3 Encounters:   11/30/18 127/68   11/02/18 142/77   10/04/18 124/73    Weight from Last 3 Encounters:   11/30/18 123.1 kg (271 lb 6.4 oz)   11/02/18 119.7 kg (264 lb)   10/04/18 115.8 kg (255 lb 3.2 oz)              Today, you had the following     No orders found for display       Primary Care Provider Fax #    Physician No Ref-Primary [phone]       No address on file        Equal Access to Services     [facility]: Hadii tim Kline, wasoda belgica, qaybmelania tadeoalmyesah forte, giancarlo childs . So [facility] [phone].    ATENCIÓN: Si habla español, tiene a tripathi disposición servicios gratuitos de asistencia lingüística. Llame al [phone].    We comply with applicable federal civil rights laws and [place] laws. We do not discriminate on the basis of race, color, national origin, age, disability, sex, sexual orientation, or gender identity.            Thank you!     Thank you for choosing [facility]  for your care. Our goal is always to provide you with excellent care. Hearing back from our patients is one way we can continue to improve our services. Please take a few minutes to complete the written survey that you may receive in the mail after your visit with us. Thank you!             Your Updated Medication List - Protect others around you: Learn how to safely use, store and throw away your medicines at www.disposemymeds.org.        "   This list is accurate as of 11/30/18 11:59 PM.  Always use your most recent med list.                   Brand Name Dispense Instructions for use Diagnosis    cetirizine 10 MG tablet    zyrTEC     Take 10 mg by mouth daily        prenatal multivitamin w/iron 27-0.8 MG tablet      Take 1 tablet by mouth daily

## 2018-12-01 LAB — T PALLIDUM AB SER QL: NONREACTIVE

## 2018-12-03 NOTE — PROGRESS NOTES
Concerns:   Doing well.  No concerns today.  No vaginal bleeding, no contractions, no leakage of fluid  No nausea/vomiting. No heartburn  No vaginal discharge. No dysuria.   No headache, vision changes, lower extremity swelling, upper abdominal pain, chest pain, shortness of breath  Tdap planned next visit  Discussed PTL, PROM, and when to call or come in.  Normal anatomy ultrasound.  RTC 4 weeks.  GTT and labs today      Francis Somers MD

## 2019-01-02 ENCOUNTER — PRENATAL OFFICE VISIT (OUTPATIENT)
Dept: OBGYN | Facility: CLINIC | Age: 24
End: 2019-01-02
Payer: COMMERCIAL

## 2019-01-02 VITALS
WEIGHT: 279 LBS | HEIGHT: 67 IN | SYSTOLIC BLOOD PRESSURE: 135 MMHG | DIASTOLIC BLOOD PRESSURE: 88 MMHG | HEART RATE: 103 BPM | BODY MASS INDEX: 43.79 KG/M2 | TEMPERATURE: 98.2 F | RESPIRATION RATE: 18 BRPM

## 2019-01-02 DIAGNOSIS — Z67.91 RH NEGATIVE, ANTEPARTUM: ICD-10-CM

## 2019-01-02 DIAGNOSIS — Z34.03 ENCOUNTER FOR PRENATAL CARE IN THIRD TRIMESTER OF FIRST PREGNANCY: Primary | ICD-10-CM

## 2019-01-02 DIAGNOSIS — O26.899 RH NEGATIVE, ANTEPARTUM: ICD-10-CM

## 2019-01-02 PROCEDURE — 99207 ZZC PRENATAL VISIT: CPT | Performed by: OBSTETRICS & GYNECOLOGY

## 2019-01-02 PROCEDURE — 96372 THER/PROPH/DIAG INJ SC/IM: CPT | Performed by: OBSTETRICS & GYNECOLOGY

## 2019-01-02 ASSESSMENT — MIFFLIN-ST. JEOR: SCORE: 2047.13

## 2019-01-02 NOTE — NURSING NOTE
Prior to injection, verified patient identity using patient's name and date of birth.  Due to injection administration, patient instructed to remain in clinic for 15 minutes  afterwards, and to report any adverse reaction to me immediately.    Rhogam 300  Drug Amount Wasted:  None.  Vial/Syringe: Syringe  Expiration Date:  7/20/2020  Annabelle Waters

## 2019-01-02 NOTE — PROGRESS NOTES
"CC: Here for routine prenatal visit @ 30w4d   HPI:  Feeling well; Encouraged patient to review contents of Prenatal Breastfeeding Education Toolkit. Offered opportunity to answer questions regarding the importance of skin to skin contact, early initiation of exclusive breastfeeding for the first six months and rooming in while in the hospital.  Given RX for electric breast pump  Reason for RHOGAM explained to pt--given today    PE: /88 (BP Location: Right arm, Patient Position: Chair, Cuff Size: Adult Large)   Pulse 103   Temp 98.2  F (36.8  C) (Tympanic)   Resp 18   Ht 1.708 m (5' 7.25\")   Wt 126.6 kg (279 lb)   LMP 06/02/2018   BMI 43.37 kg/m     See OB flowsheet      A:  1. Encounter for prenatal care in third trimester of first pregnancy      2. Rh negative, antepartum    - rho(D) immune globulin (HYPERRHO/RHOGAM) injection 300 mcg; Inject 300 mcg into the muscle once  - THER/PROPH/DIAG INJ, SC/IM      Routine prenatal care  RTC 2 weeks.      Jenny Cedeño M.D.     "

## 2019-01-18 ENCOUNTER — PRENATAL OFFICE VISIT (OUTPATIENT)
Dept: OBGYN | Facility: CLINIC | Age: 24
End: 2019-01-18
Payer: COMMERCIAL

## 2019-01-18 VITALS
HEART RATE: 90 BPM | RESPIRATION RATE: 20 BRPM | BODY MASS INDEX: 45.3 KG/M2 | HEIGHT: 67 IN | WEIGHT: 288.6 LBS | SYSTOLIC BLOOD PRESSURE: 131 MMHG | DIASTOLIC BLOOD PRESSURE: 82 MMHG | TEMPERATURE: 98.3 F

## 2019-01-18 DIAGNOSIS — Z23 NEED FOR TDAP VACCINATION: Primary | ICD-10-CM

## 2019-01-18 PROCEDURE — 90715 TDAP VACCINE 7 YRS/> IM: CPT | Performed by: OBSTETRICS & GYNECOLOGY

## 2019-01-18 PROCEDURE — 90471 IMMUNIZATION ADMIN: CPT | Performed by: OBSTETRICS & GYNECOLOGY

## 2019-01-18 PROCEDURE — 99207 ZZC PRENATAL VISIT: CPT | Performed by: OBSTETRICS & GYNECOLOGY

## 2019-01-18 ASSESSMENT — MIFFLIN-ST. JEOR: SCORE: 2090.67

## 2019-01-18 NOTE — PROGRESS NOTES
"CC: Here for routine prenatal visit @ 32w6d   HPI: + FM, no ctx, no LOF, no VB.  Some leg swelling.  Denies headaches/blurry vision    PE: /82 (BP Location: Left arm, Patient Position: Chair, Cuff Size: Adult Large)   Pulse 90   Temp 98.3  F (36.8  C) (Tympanic)   Resp 20   Ht 1.708 m (5' 7.25\")   Wt 130.9 kg (288 lb 9.6 oz)   LMP 06/02/2018   Breastfeeding? No   BMI 44.87 kg/m     See OB flowsheet    A/P G1 @ 32w6d normal pregnancy    1. Routine prenatal care.  Reviewed s/sx of PreX    RTC 2 weeks.      Maida Fair M.D.    "

## 2019-01-18 NOTE — NURSING NOTE
"Initial /82 (BP Location: Left arm, Patient Position: Chair, Cuff Size: Adult Large)   Pulse 90   Temp 98.3  F (36.8  C) (Tympanic)   Resp 20   Ht 1.708 m (5' 7.25\")   Wt 130.9 kg (288 lb 9.6 oz)   LMP 06/02/2018   Breastfeeding? No   BMI 44.87 kg/m   Estimated body mass index is 44.87 kg/m  as calculated from the following:    Height as of this encounter: 1.708 m (5' 7.25\").    Weight as of this encounter: 130.9 kg (288 lb 9.6 oz). .    Sommer Saini, DERRICK    "

## 2019-01-18 NOTE — PROGRESS NOTES
Screening Questionnaire for Adult Immunization    Are you sick today?   Yes- just getting over cold   Do you have allergies to medications, food, a vaccine component or latex?   No   Have you ever had a serious reaction after receiving a vaccination?   No   Do you have a long-term health problem with heart disease, lung disease, asthma, kidney disease, metabolic disease (e.g. diabetes), anemia, or other blood disorder?   No   Do you have cancer, leukemia, HIV/AIDS, or any other immune system problem?   No   In the past 3 months, have you taken medications that affect  your immune system, such as prednisone, other steroids, or anticancer drugs; drugs for the treatment of rheumatoid arthritis, Crohn s disease, or psoriasis; or have you had radiation treatments?   No   Have you had a seizure, or a brain or other nervous system problem?   No   During the past year, have you received a transfusion of blood or blood     products, or been given immune (gamma) globulin or antiviral drug?   No   For women: Are you pregnant or is there a chance you could become        pregnant during the next month?   Yes   Have you received any vaccinations in the past 4 weeks?   No          Per orders of Dr. Fair, injection of TDAP given by Sommer Saini. Patient instructed to remain in clinic for 15 minutes afterwards, and to report any adverse reaction to me immediately.       Screening performed by Sommer Saini on 1/18/2019 at 2:57 PM.

## 2019-02-01 ENCOUNTER — PRENATAL OFFICE VISIT (OUTPATIENT)
Dept: OBGYN | Facility: CLINIC | Age: 24
End: 2019-02-01
Payer: COMMERCIAL

## 2019-02-01 VITALS
DIASTOLIC BLOOD PRESSURE: 82 MMHG | TEMPERATURE: 98.9 F | BODY MASS INDEX: 45.67 KG/M2 | HEART RATE: 116 BPM | HEIGHT: 67 IN | SYSTOLIC BLOOD PRESSURE: 133 MMHG | WEIGHT: 291 LBS | RESPIRATION RATE: 18 BRPM

## 2019-02-01 DIAGNOSIS — Z34.03 ENCOUNTER FOR PRENATAL CARE IN THIRD TRIMESTER OF FIRST PREGNANCY: Primary | ICD-10-CM

## 2019-02-01 PROCEDURE — 99207 ZZC PRENATAL VISIT: CPT | Performed by: OBSTETRICS & GYNECOLOGY

## 2019-02-01 ASSESSMENT — MIFFLIN-ST. JEOR: SCORE: 2101.56

## 2019-02-01 NOTE — PROGRESS NOTES
"CC: Here for routine prenatal visit @ 34w6d   HPI:  Denies contractions or LOF; feeling well; aware of fetal movement assessment    PE: /82 (BP Location: Right arm, Patient Position: Chair, Cuff Size: Adult Large)   Pulse 116   Temp 98.9  F (37.2  C) (Tympanic)   Resp 18   Ht 1.708 m (5' 7.25\")   Wt 132 kg (291 lb)   LMP 06/02/2018   BMI 45.24 kg/m     See OB flowsheet      A:  1. Encounter for prenatal care in third trimester of first pregnancy        Routine prenatal care  RTC 1 weeks.      Jenny Cedeño M.D.     "

## 2019-02-06 ENCOUNTER — PRENATAL OFFICE VISIT (OUTPATIENT)
Dept: OBGYN | Facility: CLINIC | Age: 24
End: 2019-02-06
Payer: COMMERCIAL

## 2019-02-06 VITALS
DIASTOLIC BLOOD PRESSURE: 70 MMHG | HEIGHT: 67 IN | SYSTOLIC BLOOD PRESSURE: 123 MMHG | BODY MASS INDEX: 45.04 KG/M2 | RESPIRATION RATE: 18 BRPM | WEIGHT: 287 LBS | TEMPERATURE: 99 F | HEART RATE: 145 BPM

## 2019-02-06 DIAGNOSIS — Z34.03 ENCOUNTER FOR PRENATAL CARE IN THIRD TRIMESTER OF FIRST PREGNANCY: Primary | ICD-10-CM

## 2019-02-06 PROCEDURE — 99207 ZZC PRENATAL VISIT: CPT | Performed by: OBSTETRICS & GYNECOLOGY

## 2019-02-06 PROCEDURE — 87653 STREP B DNA AMP PROBE: CPT | Performed by: OBSTETRICS & GYNECOLOGY

## 2019-02-06 ASSESSMENT — MIFFLIN-ST. JEOR: SCORE: 2083.41

## 2019-02-06 NOTE — PROGRESS NOTES
"CC: Here for routine prenatal visit @ 35w4d   HPI:  Had nausea/vomiting over night; better now; denies contractions or LOF    PE: /70 (BP Location: Right arm, Patient Position: Chair, Cuff Size: Adult Large)   Pulse 145   Temp 99  F (37.2  C) (Tympanic)   Resp 18   Ht 1.708 m (5' 7.25\")   Wt 130.2 kg (287 lb)   LMP 06/02/2018   BMI 44.62 kg/m     See OB flowsheet      A:  1. Encounter for prenatal care in third trimester of first pregnancy        Routine prenatal care  RTC 1 weeks.      Jenny Cedeño M.D.     "

## 2019-02-07 LAB
GP B STREP DNA SPEC QL NAA+PROBE: NEGATIVE
SPECIMEN SOURCE: NORMAL

## 2019-02-11 ENCOUNTER — TELEPHONE (OUTPATIENT)
Dept: OBGYN | Facility: CLINIC | Age: 24
End: 2019-02-11

## 2019-02-11 DIAGNOSIS — O26.93 PREGNANCY RELATED CONDITION IN THIRD TRIMESTER: Primary | ICD-10-CM

## 2019-02-11 DIAGNOSIS — O26.93 PREGNANCY RELATED CONDITION IN THIRD TRIMESTER: ICD-10-CM

## 2019-02-11 LAB
ALBUMIN SERPL-MCNC: 2.6 G/DL (ref 3.4–5)
ALP SERPL-CCNC: 149 U/L (ref 40–150)
ALT SERPL W P-5'-P-CCNC: 34 U/L (ref 0–50)
ANION GAP SERPL CALCULATED.3IONS-SCNC: 8 MMOL/L (ref 3–14)
AST SERPL W P-5'-P-CCNC: 26 U/L (ref 0–45)
BILIRUB SERPL-MCNC: 0.4 MG/DL (ref 0.2–1.3)
BUN SERPL-MCNC: 3 MG/DL (ref 7–30)
CALCIUM SERPL-MCNC: 8.6 MG/DL (ref 8.5–10.1)
CHLORIDE SERPL-SCNC: 106 MMOL/L (ref 94–109)
CO2 SERPL-SCNC: 25 MMOL/L (ref 20–32)
CREAT SERPL-MCNC: 0.58 MG/DL (ref 0.52–1.04)
GFR SERPL CREATININE-BSD FRML MDRD: >90 ML/MIN/{1.73_M2}
GLUCOSE SERPL-MCNC: 87 MG/DL (ref 70–99)
POTASSIUM SERPL-SCNC: 3.3 MMOL/L (ref 3.4–5.3)
PROT SERPL-MCNC: 6.5 G/DL (ref 6.8–8.8)
SODIUM SERPL-SCNC: 139 MMOL/L (ref 133–144)

## 2019-02-11 PROCEDURE — 80053 COMPREHEN METABOLIC PANEL: CPT | Performed by: OBSTETRICS & GYNECOLOGY

## 2019-02-11 PROCEDURE — 83789 MASS SPECTROMETRY QUAL/QUAN: CPT | Mod: 90 | Performed by: OBSTETRICS & GYNECOLOGY

## 2019-02-11 PROCEDURE — 99000 SPECIMEN HANDLING OFFICE-LAB: CPT | Performed by: OBSTETRICS & GYNECOLOGY

## 2019-02-11 PROCEDURE — 36415 COLL VENOUS BLD VENIPUNCTURE: CPT | Performed by: OBSTETRICS & GYNECOLOGY

## 2019-02-11 NOTE — TELEPHONE ENCOUNTER
S-(situation): Pt experiencing itchy hands and feet that started on Saturday.  Friday she had itching of chest and back.  Pt goggled her symptoms and called the Birth center, she reports one of the nurses spoke to Dr. Carpenter whom recommended pt have labs done on Monday to r/o cholestasis with pregnancy.  No documentation regarding phone call from yesterday and no labs ordered following this call.      B-(background): , 36w2d.     A-(assessment): No nausea or vomiting and no fever.  Has noticed her urine is darker than her norm.  She denies burning, frequency or urgency with urination.  Pt reports that she is having abdominal pain and is very gassy.  Has had several episodes of diarrhea through the night.  Pt is having good fetal move't and no leaking of fluid.    R-(recommendations): To provider to review and place lab orders.      Magda Vizcarra  [facility] RN

## 2019-02-11 NOTE — TELEPHONE ENCOUNTER
Reason for Call: Request for an order or referral:    Order or referral being requested: blood work    Date needed: as soon as possible    Has the patient been seen by the PCP for this problem? NO    Additional comments: Pt states she called yesterday and was advised to come in today for bloodwork.  Now calling to get this set up (does she need orders?) doesn't know what type of test she needs done.    Phone number Patient can be reached at:  Home number on file [phone] (home)    Best Time:  any    Can we leave a detailed message on this number?  YES    Call taken on 2/11/2019 at 8:17 AM by Bety Patterson

## 2019-02-12 ENCOUNTER — PRENATAL OFFICE VISIT (OUTPATIENT)
Dept: OBGYN | Facility: CLINIC | Age: 24
End: 2019-02-12
Payer: COMMERCIAL

## 2019-02-12 VITALS
BODY MASS INDEX: 45.86 KG/M2 | DIASTOLIC BLOOD PRESSURE: 79 MMHG | WEIGHT: 292.2 LBS | HEIGHT: 67 IN | TEMPERATURE: 98.1 F | HEART RATE: 88 BPM | SYSTOLIC BLOOD PRESSURE: 136 MMHG | RESPIRATION RATE: 18 BRPM

## 2019-02-12 DIAGNOSIS — Z34.03 ENCOUNTER FOR PRENATAL CARE IN THIRD TRIMESTER OF FIRST PREGNANCY: Primary | ICD-10-CM

## 2019-02-12 PROCEDURE — 99207 ZZC PRENATAL VISIT: CPT | Performed by: OBSTETRICS & GYNECOLOGY

## 2019-02-12 ASSESSMENT — MIFFLIN-ST. JEOR: SCORE: 2107

## 2019-02-12 NOTE — NURSING NOTE
"Chief Complaint   Patient presents with     Prenatal Care     go over labs from yesterday       Initial /79 (BP Location: Right arm, Patient Position: Chair, Cuff Size: Adult Large)   Pulse 88   Temp 98.1  F (36.7  C) (Tympanic)   Resp 18   Ht 1.708 m (5' 7.25\")   Wt 132.5 kg (292 lb 3.2 oz)   LMP 06/02/2018   BMI 45.43 kg/m   Estimated body mass index is 45.43 kg/m  as calculated from the following:    Height as of this encounter: 1.708 m (5' 7.25\").    Weight as of this encounter: 132.5 kg (292 lb 3.2 oz).  Medications and allergies reviewed.    Vinay RIZZO, CMA    "

## 2019-02-12 NOTE — PROGRESS NOTES
"CC: Here for routine prenatal visit @ 36w3d   HPI: + FM, no ctx, no LOF, no VB.  Having itchy hands and feet--bile acids pending.     PE: /79 (BP Location: Right arm, Patient Position: Chair, Cuff Size: Adult Large)   Pulse 88   Temp 98.1  F (36.7  C) (Tympanic)   Resp 18   Ht 1.708 m (5' 7.25\")   Wt 132.5 kg (292 lb 3.2 oz)   LMP 2018   BMI 45.43 kg/m     See OB flowsheet    GBS negative  Component      Latest Ref Rng & Units 2019   Sodium      133 - 144 mmol/L 139   Potassium      3.4 - 5.3 mmol/L 3.3 (L)   Chloride      94 - 109 mmol/L 106   Carbon Dioxide      20 - 32 mmol/L 25   Anion Gap      3 - 14 mmol/L 8   Glucose      70 - 99 mg/dL 87   Urea Nitrogen      7 - 30 mg/dL 3 (L)   Creatinine      0.52 - 1.04 mg/dL 0.58   GFR Estimate      >60 mL/min/1.73:m2 >90   GFR Estimate If Black      >60 mL/min/1.73:m2 >90   Calcium      8.5 - 10.1 mg/dL 8.6   Bilirubin Total      0.2 - 1.3 mg/dL 0.4   Albumin      3.4 - 5.0 g/dL 2.6 (L)   Protein Total      6.8 - 8.8 g/dL 6.5 (L)   Alkaline Phosphatase      40 - 150 U/L 149   ALT      0 - 50 U/L 34   AST      0 - 45 U/L 26       A/P G1 @ 36w3d normal pregnancy    1. Routine prenatal care.  Discussed with Dalila Galvez, the following; indications; the agents and methods of labor augmentation, including risks, benefits, and alternative approaches; and the possible need for  birth. EFW is AGA.    The Labor Induction:what you need to know information sheet was made available to her. Questions and concerns were addressed and patient agrees to above if necessary during the course of her labor.    RTC 1 week    Maida Fair M.D.    "

## 2019-02-16 LAB
BILE AC SERPL-SCNC: 0.4 UMOL/L (ref 0–2.5)
CDCAE SERPL-SCNC: 2.9 UMOL/L (ref 0–3.4)
CHOLATE SERPL-SCNC: 8.1 UMOL/L (ref 0–7)
DO-CHOLATE SERPL-SCNC: 4.5 UMOL/L (ref 0–1.9)
URSODEOXYCHOLATE SERPL-SCNC: 0.3 UMOL/L (ref 0–1)

## 2019-02-18 ENCOUNTER — HOSPITAL ENCOUNTER (INPATIENT)
Facility: CLINIC | Age: 24
LOS: 6 days | Discharge: HOME | End: 2019-02-24
Attending: OBSTETRICS & GYNECOLOGY | Admitting: OBSTETRICS & GYNECOLOGY
Payer: COMMERCIAL

## 2019-02-18 DIAGNOSIS — Z98.891 S/P CESAREAN SECTION: Primary | ICD-10-CM

## 2019-02-18 PROBLEM — O26.643 CHOLESTASIS DURING PREGNANCY IN THIRD TRIMESTER: Status: ACTIVE | Noted: 2019-02-18

## 2019-02-18 LAB
ABO + RH BLD: ABNORMAL
ABO + RH BLD: ABNORMAL
ABO + RH BLD: NORMAL
ABO + RH BLD: NORMAL
ALBUMIN SERPL-MCNC: 2.6 G/DL (ref 3.4–5)
ALP SERPL-CCNC: 148 U/L (ref 40–150)
ALT SERPL W P-5'-P-CCNC: 32 U/L (ref 0–50)
ANION GAP SERPL CALCULATED.3IONS-SCNC: 11 MMOL/L (ref 3–14)
AST SERPL W P-5'-P-CCNC: 22 U/L (ref 0–45)
BASOPHILS # BLD AUTO: 0 10E9/L (ref 0–0.2)
BASOPHILS NFR BLD AUTO: 0.2 %
BILIRUB SERPL-MCNC: 0.3 MG/DL (ref 0.2–1.3)
BLD GP AB INVEST PLASRBC-IMP: NORMAL
BLD GP AB SCN SERPL QL: ABNORMAL
BLOOD BANK CMNT PATIENT-IMP: ABNORMAL
BLOOD BANK CMNT PATIENT-IMP: ABNORMAL
BLOOD BANK CMNT PATIENT-IMP: NORMAL
BUN SERPL-MCNC: 6 MG/DL (ref 7–30)
CALCIUM SERPL-MCNC: 9.2 MG/DL (ref 8.5–10.1)
CHLORIDE SERPL-SCNC: 107 MMOL/L (ref 94–109)
CO2 SERPL-SCNC: 21 MMOL/L (ref 20–32)
CREAT SERPL-MCNC: 0.54 MG/DL (ref 0.52–1.04)
DIFFERENTIAL METHOD BLD: NORMAL
EOSINOPHIL # BLD AUTO: 0 10E9/L (ref 0–0.7)
EOSINOPHIL NFR BLD AUTO: 0.3 %
ERYTHROCYTE [DISTWIDTH] IN BLOOD BY AUTOMATED COUNT: 14.4 % (ref 10–15)
GFR SERPL CREATININE-BSD FRML MDRD: >90 ML/MIN/{1.73_M2}
GLUCOSE SERPL-MCNC: 92 MG/DL (ref 70–99)
HCT VFR BLD AUTO: 36.3 % (ref 35–47)
HGB BLD-MCNC: 11.8 G/DL (ref 11.7–15.7)
IMM GRANULOCYTES # BLD: 0.1 10E9/L (ref 0–0.4)
IMM GRANULOCYTES NFR BLD: 0.6 %
LYMPHOCYTES # BLD AUTO: 1.9 10E9/L (ref 0.8–5.3)
LYMPHOCYTES NFR BLD AUTO: 21.2 %
MCH RBC QN AUTO: 29.9 PG (ref 26.5–33)
MCHC RBC AUTO-ENTMCNC: 32.5 G/DL (ref 31.5–36.5)
MCV RBC AUTO: 92 FL (ref 78–100)
MONOCYTES # BLD AUTO: 0.6 10E9/L (ref 0–1.3)
MONOCYTES NFR BLD AUTO: 7.3 %
NEUTROPHILS # BLD AUTO: 6.2 10E9/L (ref 1.6–8.3)
NEUTROPHILS NFR BLD AUTO: 70.4 %
NRBC # BLD AUTO: 0 10*3/UL
NRBC BLD AUTO-RTO: 0 /100
PLATELET # BLD AUTO: 278 10E9/L (ref 150–450)
POTASSIUM SERPL-SCNC: 3.7 MMOL/L (ref 3.4–5.3)
PROT SERPL-MCNC: 6.4 G/DL (ref 6.8–8.8)
RBC # BLD AUTO: 3.94 10E12/L (ref 3.8–5.2)
SODIUM SERPL-SCNC: 139 MMOL/L (ref 133–144)
SPECIMEN EXP DATE BLD: ABNORMAL
WBC # BLD AUTO: 8.8 10E9/L (ref 4–11)

## 2019-02-18 PROCEDURE — 3E0P7VZ INTRODUCTION OF HORMONE INTO FEMALE REPRODUCTIVE, VIA NATURAL OR ARTIFICIAL OPENING: ICD-10-PCS | Performed by: OBSTETRICS & GYNECOLOGY

## 2019-02-18 PROCEDURE — 86901 BLOOD TYPING SEROLOGIC RH(D): CPT | Performed by: OBSTETRICS & GYNECOLOGY

## 2019-02-18 PROCEDURE — 12000000 ZZH R&B MED SURG/OB

## 2019-02-18 PROCEDURE — 80053 COMPREHEN METABOLIC PANEL: CPT | Performed by: OBSTETRICS & GYNECOLOGY

## 2019-02-18 PROCEDURE — 59025 FETAL NON-STRESS TEST: CPT | Mod: 26 | Performed by: OBSTETRICS & GYNECOLOGY

## 2019-02-18 PROCEDURE — 86850 RBC ANTIBODY SCREEN: CPT | Performed by: OBSTETRICS & GYNECOLOGY

## 2019-02-18 PROCEDURE — 99221 1ST HOSP IP/OBS SF/LOW 40: CPT | Mod: 25 | Performed by: OBSTETRICS & GYNECOLOGY

## 2019-02-18 PROCEDURE — 0064U ANTB TP TOTAL&RPR IA QUAL: CPT | Performed by: OBSTETRICS & GYNECOLOGY

## 2019-02-18 PROCEDURE — 86870 RBC ANTIBODY IDENTIFICATION: CPT | Performed by: OBSTETRICS & GYNECOLOGY

## 2019-02-18 PROCEDURE — 86900 BLOOD TYPING SEROLOGIC ABO: CPT | Performed by: OBSTETRICS & GYNECOLOGY

## 2019-02-18 PROCEDURE — 36415 COLL VENOUS BLD VENIPUNCTURE: CPT | Performed by: OBSTETRICS & GYNECOLOGY

## 2019-02-18 PROCEDURE — 82565 ASSAY OF CREATININE: CPT | Performed by: OBSTETRICS & GYNECOLOGY

## 2019-02-18 PROCEDURE — 25000132 ZZH RX MED GY IP 250 OP 250 PS 637: Performed by: OBSTETRICS & GYNECOLOGY

## 2019-02-18 PROCEDURE — 85025 COMPLETE CBC W/AUTO DIFF WBC: CPT | Performed by: OBSTETRICS & GYNECOLOGY

## 2019-02-18 RX ORDER — MORPHINE SULFATE 10 MG/ML
10 INJECTION, SOLUTION INTRAMUSCULAR; INTRAVENOUS
Status: DISCONTINUED | OUTPATIENT
Start: 2019-02-18 | End: 2019-02-21

## 2019-02-18 RX ORDER — ONDANSETRON 2 MG/ML
4 INJECTION INTRAMUSCULAR; INTRAVENOUS EVERY 6 HOURS PRN
Status: DISCONTINUED | OUTPATIENT
Start: 2019-02-18 | End: 2019-02-21

## 2019-02-18 RX ORDER — IBUPROFEN 800 MG/1
800 TABLET, FILM COATED ORAL
Status: DISCONTINUED | OUTPATIENT
Start: 2019-02-18 | End: 2019-02-21

## 2019-02-18 RX ORDER — OXYTOCIN/0.9 % SODIUM CHLORIDE 30/500 ML
100-340 PLASTIC BAG, INJECTION (ML) INTRAVENOUS CONTINUOUS PRN
Status: COMPLETED | OUTPATIENT
Start: 2019-02-18 | End: 2019-02-21

## 2019-02-18 RX ORDER — OXYTOCIN 10 [USP'U]/ML
10 INJECTION, SOLUTION INTRAMUSCULAR; INTRAVENOUS
Status: DISCONTINUED | OUTPATIENT
Start: 2019-02-18 | End: 2019-02-21

## 2019-02-18 RX ORDER — ACETAMINOPHEN 325 MG/1
650 TABLET ORAL EVERY 4 HOURS PRN
Status: DISCONTINUED | OUTPATIENT
Start: 2019-02-18 | End: 2019-02-21

## 2019-02-18 RX ORDER — DIPHENHYDRAMINE HCL 25 MG
50 CAPSULE ORAL EVERY 6 HOURS PRN
Status: DISCONTINUED | OUTPATIENT
Start: 2019-02-18 | End: 2019-02-21

## 2019-02-18 RX ORDER — METHYLERGONOVINE MALEATE 0.2 MG/ML
200 INJECTION INTRAVENOUS
Status: DISCONTINUED | OUTPATIENT
Start: 2019-02-18 | End: 2019-02-21

## 2019-02-18 RX ORDER — OXYCODONE AND ACETAMINOPHEN 5; 325 MG/1; MG/1
1 TABLET ORAL
Status: DISCONTINUED | OUTPATIENT
Start: 2019-02-18 | End: 2019-02-21

## 2019-02-18 RX ORDER — CARBOPROST TROMETHAMINE 250 UG/ML
250 INJECTION, SOLUTION INTRAMUSCULAR
Status: DISCONTINUED | OUTPATIENT
Start: 2019-02-18 | End: 2019-02-21

## 2019-02-18 RX ORDER — MISOPROSTOL 100 UG/1
25 TABLET ORAL
Status: DISCONTINUED | OUTPATIENT
Start: 2019-02-18 | End: 2019-02-21

## 2019-02-18 RX ORDER — NALOXONE HYDROCHLORIDE 0.4 MG/ML
.1-.4 INJECTION, SOLUTION INTRAMUSCULAR; INTRAVENOUS; SUBCUTANEOUS
Status: DISCONTINUED | OUTPATIENT
Start: 2019-02-18 | End: 2019-02-21

## 2019-02-18 RX ORDER — DIPHENHYDRAMINE HCL 25 MG
25 TABLET ORAL EVERY 6 HOURS PRN
COMMUNITY
End: 2020-11-20

## 2019-02-18 RX ORDER — FENTANYL CITRATE 50 UG/ML
50-100 INJECTION, SOLUTION INTRAMUSCULAR; INTRAVENOUS
Status: DISCONTINUED | OUTPATIENT
Start: 2019-02-18 | End: 2019-02-21

## 2019-02-18 RX ADMIN — Medication 25 MCG: at 18:48

## 2019-02-18 RX ADMIN — Medication 25 MCG: at 16:48

## 2019-02-18 RX ADMIN — Medication 25 MCG: at 23:05

## 2019-02-18 RX ADMIN — Medication 25 MCG: at 20:59

## 2019-02-18 ASSESSMENT — MIFFLIN-ST. JEOR: SCORE: 2106.67

## 2019-02-18 NOTE — PLAN OF CARE
Patient , 37+2, arrived to  at 1441 for induction of labor for cholestasis in pregnancy. SVE 1/40/-3 with deleon score of 4. FHT category 1 and reactive with baseline of 140. Few contractions on toco, patient unable to feel. Dr Courtney updated with patient status and orders placed for PO cytotec. Patient's spouse at bedside and supportive. Induction process explained and questions answered.

## 2019-02-18 NOTE — H&P
[facility] Labor and Delivery H&P  2019  Dalila Galvez  [id number]      HPI: Dalila Galvez is a 23 year old  at 37w2d by LMP c/w 8w6d US, here for induction of labor as indicated by cholestasis of pregnancy.    She states that she is feeling well today.  + FM, no ctx, VB, or LOF.  She denies fever, HA, scotoma, nausea, vomiting, CP, SOB, RUQ pain, constipation, diarrhea, and acute swelling. Continues to have fairly intense itching of the hands and palms, but this has been better if she doesn't scratch them at all. Has figured out how to sleep through it.     Pregnancy notable for:  --Rh negative status  --Family hx of cardiac defects, normal L2 and fetal ECHO      OBHX:   Obstetric History       T0      L0     SAB0   TAB0   Ectopic0   Multiple0   Live Births0       # Outcome Date GA Lbr Benigno/2nd Weight Sex Delivery Anes PTL Lv   1 Current                   MedicalHX:   Past Medical History:   Diagnosis Date     Chickenpox        SurgicalHX:   Past Surgical History:   Procedure Laterality Date     NO HISTORY OF SURGERY         Medications:     No current facility-administered medications on file prior to encounter.   Current Outpatient Medications on File Prior to Encounter:  cetirizine (ZYRTEC) 10 MG tablet Take 10 mg by mouth daily   diphenhydrAMINE (BENADRYL) 25 MG tablet Take 25 mg by mouth every 6 hours as needed for itching or allergies   Prenatal Vit-Fe Fumarate-FA (PRENATAL MULTIVITAMIN PLUS IRON) 27-0.8 MG TABS per tablet Take 1 tablet by mouth daily       Allergies:  No Known Allergies    FamilyHX:  Family History   Problem Relation Age of Onset     Heart Disease Maternal Grandfather      Coronary Artery Disease Paternal Grandfather         MI       SocialHX:   Social History     Socioeconomic History     Marital status:      Spouse name: None     Number of children: None     Years of education: None     Highest education level: None   Social Needs      "Financial resource strain: None     Food insecurity - worry: None     Food insecurity - inability: None     Transportation needs - medical: None     Transportation needs - non-medical: None   Occupational History     None   Tobacco Use     Smoking status: Never Smoker     Smokeless tobacco: Never Used   Substance and Sexual Activity     Alcohol use: Yes     Comment: rare-quit with pregnancy     Drug use: No     Sexual activity: Yes     Partners: Male   Other Topics Concern     None   Social History Narrative     None       ROS: 10-point ROS negative except as in HPI     Physical Exam:  Vitals:    02/18/19 1535   BP: 140/81   BP Location: Right arm   Pulse: 93   Resp: 16   Temp: 98.3  F (36.8  C)   TempSrc: Oral   Weight: 132.9 kg (293 lb)   Height: 1.702 m (5' 7\")     GEN: resting comfortably in bed, NAD   CV: Regular rate, warm and well-perfused  PULM: no increased work of breathing  ABD: soft, gravid, non-tender, non-distended  EXT: no edema, non-tender to palpation  CVX: 1/40/-3  Presentation: cephalic by leopold's  EFW: 7 lbs  Membranes: intact    NST:  FHT: baseline nl, mod variability, + accels, no decels  TOCO: 1 in 10 min     Ultrasounds:  Dating US: 8/2/2018: GA 8w6d US  Anatomy scan: 10/12/2018: GA 18w6d, placenta posterior, LACY nl, anatomy nl.     Labs:    Lab Results   Component Value Date    ABO O 08/02/2018    RH Neg 08/02/2018    AS Neg 08/02/2018    HEPBANG Nonreactive 08/02/2018    CHPCRT Negative 08/02/2018    GCPCRT Negative 08/02/2018    HGB 11.8 02/18/2019     Component      Latest Ref Rng & Units 2/11/2019   Sodium      133 - 144 mmol/L 139   Potassium      3.4 - 5.3 mmol/L 3.3 (L)   Chloride      94 - 109 mmol/L 106   Carbon Dioxide      20 - 32 mmol/L 25   Anion Gap      3 - 14 mmol/L 8   Glucose      70 - 99 mg/dL 87   Urea Nitrogen      7 - 30 mg/dL 3 (L)   Creatinine      0.52 - 1.04 mg/dL 0.58   GFR Estimate      >60 mL/min/1.73:m2 >90   GFR Estimate If Black      >60 mL/min/1.73:m2 >90 "   Calcium      8.5 - 10.1 mg/dL 8.6   Bilirubin Total      0.2 - 1.3 mg/dL 0.4   Albumin      3.4 - 5.0 g/dL 2.6 (L)   Protein Total      6.8 - 8.8 g/dL 6.5 (L)   Alkaline Phosphatase      40 - 150 U/L 149   ALT      0 - 50 U/L 34   AST      0 - 45 U/L 26   Cholic Acid      0.0 - 1.9 umol/L 4.5 (H)   Chenodeoxycholic      0.0 - 3.4 umol/L 2.9   Ursodeoxycholic Ac      0.0 - 1.0 umol/L 0.3   Deoxycholic Acid      0.0 - 2.5 umol/L 0.4   Bile Acids Total      0.0 - 7.0 umol/L 8.1 (H)       GBS Status:   Lab Results   Component Value Date    GBS Negative 2019       Lab Results   Component Value Date    PAP NIL 2018       A/P: Dalila Galvez is a 23 year old female  at 37w2d by LMP c/w 8w6d US, here for induction of labor as indicated by cholestasis of pregnancy.   -Admit to L&D. Place PIV. Draw labs: T&S, CBC, RPR, CMP.   -Labor: Anticipate . Discussed induction process in detail. Plan for cervical ripening now and then pitocin/AROM once favorable. Discussed with Dalila Galvez, the following; indications; the agents and methods of labor augmentation, including risks, benefits, and alternative approaches; and the possible need for  birth. EFW is AGA.  The Labor Induction:what you need to know information sheet was made available to her. Questions and concerns were addressed and patient agrees to above if necessary during the course of her labor.  -Cholestasis of pregnancy: given gestational age >36wks, recommending proceeding with IOL at the time of diagnosis (today). Discussed risk of stillbirth weighed against the risk of prematurity. Repeat CMP on admission (previously normal). PRN ursodiol and benadryl/vistaril for symptomatic relief.   FWB: Category 1 FHT.  Continue EFM and toco  Pain: Desires epidural for analgesia  PNC: Rh POS, Rubella immune, GBS negative, GCT -passed, Placenta posterior    Lesa Courtney MD  OB/GYN

## 2019-02-18 NOTE — PLAN OF CARE
Verified that the provider has discussed with Dalila Galvez, the following; indications; the agents and methods of labor induction or augmentation, including risks, benefits, and alternative approached; and the possible need for repeat induction or  birth. Questions and concerns were addressed and patient agrees to above. Instructed regarding continuous fetal monitoring and BRP's prn if FHR/uterine activity stable.

## 2019-02-19 LAB — T PALLIDUM AB SER QL: NONREACTIVE

## 2019-02-19 PROCEDURE — 25000132 ZZH RX MED GY IP 250 OP 250 PS 637: Performed by: OBSTETRICS & GYNECOLOGY

## 2019-02-19 PROCEDURE — 10907ZC DRAINAGE OF AMNIOTIC FLUID, THERAPEUTIC FROM PRODUCTS OF CONCEPTION, VIA NATURAL OR ARTIFICIAL OPENING: ICD-10-PCS | Performed by: OBSTETRICS & GYNECOLOGY

## 2019-02-19 PROCEDURE — 25000125 ZZHC RX 250: Performed by: OBSTETRICS & GYNECOLOGY

## 2019-02-19 PROCEDURE — 3E033VJ INTRODUCTION OF OTHER HORMONE INTO PERIPHERAL VEIN, PERCUTANEOUS APPROACH: ICD-10-PCS | Performed by: OBSTETRICS & GYNECOLOGY

## 2019-02-19 PROCEDURE — 99231 SBSQ HOSP IP/OBS SF/LOW 25: CPT | Performed by: OBSTETRICS & GYNECOLOGY

## 2019-02-19 PROCEDURE — 12000000 ZZH R&B MED SURG/OB

## 2019-02-19 RX ORDER — LIDOCAINE 40 MG/G
CREAM TOPICAL
Status: DISCONTINUED | OUTPATIENT
Start: 2019-02-19 | End: 2019-02-21

## 2019-02-19 RX ORDER — OXYTOCIN/0.9 % SODIUM CHLORIDE 30/500 ML
1-24 PLASTIC BAG, INJECTION (ML) INTRAVENOUS CONTINUOUS
Status: DISCONTINUED | OUTPATIENT
Start: 2019-02-19 | End: 2019-02-21

## 2019-02-19 RX ADMIN — Medication 25 MCG: at 07:01

## 2019-02-19 RX ADMIN — Medication 25 MCG: at 03:10

## 2019-02-19 RX ADMIN — Medication 25 MCG: at 01:02

## 2019-02-19 RX ADMIN — Medication 25 MCG: at 04:58

## 2019-02-19 RX ADMIN — OXYTOCIN-SODIUM CHLORIDE 0.9% IV SOLN 30 UNIT/500ML 2 MILLI-UNITS/MIN: 30-0.9/5 SOLUTION at 13:34

## 2019-02-19 RX ADMIN — Medication 25 MCG: at 09:27

## 2019-02-19 NOTE — PLAN OF CARE
Cook bulb placed for cervical ripening by Dr. Salgado.  Will start pitocin per protocol at 2mu/min.

## 2019-02-19 NOTE — PLAN OF CARE
Dr. Salgado notified of SVE at 0905.  Plan to give one dose of oral cytotec now and recheck cervix in 2 hours.

## 2019-02-19 NOTE — PROGRESS NOTES
Intrapartum Progress Note    S: Feeling OK, not much in the way of contractions    O:  Vitals:    19 0311 19 0315 19 0745 19 1038   BP:  134/84 150/78    BP Location:       Pulse:  82     Resp:       Temp: 98.5  F (36.9  C)  97.9  F (36.6  C) 98.2  F (36.8  C)   TempSrc: Oral  Oral Oral   Weight:       Height:         General: comfortable  Cervix: /-2  Cook catheter placed with 80cc in uterine balloon, 80cc in vaginal balloon    A/P: 23 year old  @ 37w3d here for IOL for ICP  - single elevated BP - continue to monitor clinically  - Labor: s/p po cytotec overnight, now cook catheter in place and will start pitocin.  AROM when able, in a good contraction pattern  - Pain management: nothing for now  - GBS neg  - Anticipate IOL >     Clementina Salgado MD  [facility] OB/Gyn

## 2019-02-19 NOTE — PLAN OF CARE
Assumed care of patient at 2320 after introduction and bedside report completed. Patient comfortable and  is resting on couch bed. Call light within reach. Will continue with POC.

## 2019-02-20 ENCOUNTER — ANESTHESIA (OUTPATIENT)
Dept: OBGYN | Facility: CLINIC | Age: 24
End: 2019-02-20
Payer: COMMERCIAL

## 2019-02-20 ENCOUNTER — ANESTHESIA EVENT (OUTPATIENT)
Dept: OBGYN | Facility: CLINIC | Age: 24
End: 2019-02-20
Payer: COMMERCIAL

## 2019-02-20 PROCEDURE — 25000125 ZZHC RX 250: Performed by: OBSTETRICS & GYNECOLOGY

## 2019-02-20 PROCEDURE — 3E0R3BZ INTRODUCTION OF ANESTHETIC AGENT INTO SPINAL CANAL, PERCUTANEOUS APPROACH: ICD-10-PCS | Performed by: OBSTETRICS & GYNECOLOGY

## 2019-02-20 PROCEDURE — 25800030 ZZH RX IP 258 OP 636: Performed by: OBSTETRICS & GYNECOLOGY

## 2019-02-20 PROCEDURE — 00HU33Z INSERTION OF INFUSION DEVICE INTO SPINAL CANAL, PERCUTANEOUS APPROACH: ICD-10-PCS | Performed by: OBSTETRICS & GYNECOLOGY

## 2019-02-20 PROCEDURE — 25000128 H RX IP 250 OP 636: Performed by: NURSE ANESTHETIST, CERTIFIED REGISTERED

## 2019-02-20 PROCEDURE — 25800030 ZZH RX IP 258 OP 636: Performed by: NURSE ANESTHETIST, CERTIFIED REGISTERED

## 2019-02-20 PROCEDURE — 12000000 ZZH R&B MED SURG/OB

## 2019-02-20 PROCEDURE — 37000011 ZZH ANESTHESIA WARD SERVICE: Performed by: NURSE ANESTHETIST, CERTIFIED REGISTERED

## 2019-02-20 PROCEDURE — 27110038 ZZH RX 271: Performed by: NURSE ANESTHETIST, CERTIFIED REGISTERED

## 2019-02-20 PROCEDURE — 25000125 ZZHC RX 250: Performed by: NURSE ANESTHETIST, CERTIFIED REGISTERED

## 2019-02-20 PROCEDURE — 40000671 ZZH STATISTIC ANESTHESIA CASE

## 2019-02-20 RX ORDER — LIDOCAINE HYDROCHLORIDE AND EPINEPHRINE 15; 5 MG/ML; UG/ML
INJECTION, SOLUTION EPIDURAL PRN
Status: DISCONTINUED | OUTPATIENT
Start: 2019-02-20 | End: 2019-02-21

## 2019-02-20 RX ORDER — NALOXONE HYDROCHLORIDE 0.4 MG/ML
.1-.4 INJECTION, SOLUTION INTRAMUSCULAR; INTRAVENOUS; SUBCUTANEOUS
Status: DISCONTINUED | OUTPATIENT
Start: 2019-02-20 | End: 2019-02-20

## 2019-02-20 RX ORDER — EPHEDRINE SULFATE 50 MG/ML
5 INJECTION, SOLUTION INTRAMUSCULAR; INTRAVENOUS; SUBCUTANEOUS
Status: DISCONTINUED | OUTPATIENT
Start: 2019-02-20 | End: 2019-02-21

## 2019-02-20 RX ORDER — NALBUPHINE HYDROCHLORIDE 10 MG/ML
2.5-5 INJECTION, SOLUTION INTRAMUSCULAR; INTRAVENOUS; SUBCUTANEOUS EVERY 6 HOURS PRN
Status: DISCONTINUED | OUTPATIENT
Start: 2019-02-20 | End: 2019-02-20 | Stop reason: RX

## 2019-02-20 RX ORDER — BUPIVACAINE HYDROCHLORIDE 2.5 MG/ML
INJECTION, SOLUTION EPIDURAL; INFILTRATION; INTRACAUDAL PRN
Status: DISCONTINUED | OUTPATIENT
Start: 2019-02-20 | End: 2019-02-21

## 2019-02-20 RX ORDER — LIDOCAINE HYDROCHLORIDE 10 MG/ML
INJECTION, SOLUTION INFILTRATION; PERINEURAL PRN
Status: DISCONTINUED | OUTPATIENT
Start: 2019-02-20 | End: 2019-02-21

## 2019-02-20 RX ORDER — SODIUM CHLORIDE, SODIUM LACTATE, POTASSIUM CHLORIDE, CALCIUM CHLORIDE 600; 310; 30; 20 MG/100ML; MG/100ML; MG/100ML; MG/100ML
INJECTION, SOLUTION INTRAVENOUS CONTINUOUS
Status: DISCONTINUED | OUTPATIENT
Start: 2019-02-20 | End: 2019-02-21

## 2019-02-20 RX ORDER — FENTANYL CITRATE 50 UG/ML
INJECTION, SOLUTION INTRAMUSCULAR; INTRAVENOUS PRN
Status: DISCONTINUED | OUTPATIENT
Start: 2019-02-20 | End: 2019-02-21

## 2019-02-20 RX ADMIN — SODIUM CHLORIDE, POTASSIUM CHLORIDE, SODIUM LACTATE AND CALCIUM CHLORIDE 1000 ML: 600; 310; 30; 20 INJECTION, SOLUTION INTRAVENOUS at 09:11

## 2019-02-20 RX ADMIN — Medication 12 ML/HR: at 18:45

## 2019-02-20 RX ADMIN — SODIUM CHLORIDE, POTASSIUM CHLORIDE, SODIUM LACTATE AND CALCIUM CHLORIDE: 600; 310; 30; 20 INJECTION, SOLUTION INTRAVENOUS at 18:53

## 2019-02-20 RX ADMIN — LIDOCAINE HYDROCHLORIDE 100 MG: 10 INJECTION, SOLUTION INFILTRATION; PERINEURAL at 18:27

## 2019-02-20 RX ADMIN — BUPIVACAINE HYDROCHLORIDE 3 ML: 2.5 INJECTION, SOLUTION EPIDURAL; INFILTRATION; INTRACAUDAL at 18:42

## 2019-02-20 RX ADMIN — OXYTOCIN-SODIUM CHLORIDE 0.9% IV SOLN 30 UNIT/500ML 18 MILLI-UNITS/MIN: 30-0.9/5 SOLUTION at 18:51

## 2019-02-20 RX ADMIN — SODIUM CHLORIDE, POTASSIUM CHLORIDE, SODIUM LACTATE AND CALCIUM CHLORIDE 1000 ML: 600; 310; 30; 20 INJECTION, SOLUTION INTRAVENOUS at 17:48

## 2019-02-20 RX ADMIN — BUPIVACAINE HYDROCHLORIDE 2 ML: 2.5 INJECTION, SOLUTION EPIDURAL; INFILTRATION; INTRACAUDAL at 18:43

## 2019-02-20 RX ADMIN — LIDOCAINE HYDROCHLORIDE AND EPINEPHRINE 75 MG: 15; 5 INJECTION, SOLUTION EPIDURAL at 18:39

## 2019-02-20 RX ADMIN — FENTANYL CITRATE 100 MCG: 50 INJECTION, SOLUTION INTRAMUSCULAR; INTRAVENOUS at 18:41

## 2019-02-20 RX ADMIN — SODIUM CHLORIDE, POTASSIUM CHLORIDE, SODIUM LACTATE AND CALCIUM CHLORIDE 500 ML: 600; 310; 30; 20 INJECTION, SOLUTION INTRAVENOUS at 22:47

## 2019-02-20 NOTE — PROVIDER NOTIFICATION
This note also relates to the following rows which could not be included:  Dose (sawyer-units/min) Oxytocin - Cannot attach notes to extension rows  Rate (mL/hr) Oxytocin - Cannot attach notes to extension rows  Concentration Oxytocin - Cannot attach notes to extension rows       02/20/19 0506   Provider Notification   Provider Name/Title Dr. Salgado   Method of Notification In Department   Notification Reason Labor Status;Uterine Activity;Status Update;SVE   Dr. Salgado updated on pt SVE 4/70/-1, cervix very posterior.  OK to stop Pitocin and give pt a break at this time.  Restart 06-07 am.

## 2019-02-20 NOTE — PROGRESS NOTES
0445 FHT's continue to be difficult to trace due to pt habitus.  With pt verbal consent FSE was placed.   Cervix was very posterior 4/70/-1.

## 2019-02-20 NOTE — ANESTHESIA PREPROCEDURE EVALUATION
Anesthesia Pre-Procedure Evaluation    Patient: Dalila Galvez   MRN: [id number] : 1995          Preoperative Diagnosis: * No surgery found *        Past Medical History:   Diagnosis Date     Chickenpox      Past Surgical History:   Procedure Laterality Date     NO HISTORY OF SURGERY         Anesthesia Evaluation     .             ROS/MED HX    ENT/Pulmonary:     (+)DELIA risk factors obese, , . .    Neurologic:  - neg neurologic ROS     Cardiovascular:         METS/Exercise Tolerance:     Hematologic:  - neg hematologic  ROS       Musculoskeletal:  - neg musculoskeletal ROS       GI/Hepatic:     (+) cholecystitis/cholelithiasis,       Renal/Genitourinary:  - ROS Renal section negative       Endo:     (+) Obesity (morbid), .      Psychiatric:  - neg psychiatric ROS       Infectious Disease:         Malignancy:      - no malignancy   Other:                          Physical Exam  Normal systems: cardiovascular, pulmonary and dental    Airway   Mallampati: III  TM distance: >3 FB  Neck ROM: full    Dental     Cardiovascular       Pulmonary             Lab Results   Component Value Date    WBC 8.8 2019    HGB 11.8 2019    HCT 36.3 2019     2019     2019    POTASSIUM 3.7 2019    CHLORIDE 107 2019    CO2 21 2019    BUN 6 (L) 2019    CR 0.54 2019    GLC 92 2019    STEVEN 9.2 2019    ALBUMIN 2.6 (L) 2019    PROTTOTAL 6.4 (L) 2019    ALT 32 2019    AST 22 2019    ALKPHOS 148 2019    BILITOTAL 0.3 2019       Preop Vitals  BP Readings from Last 3 Encounters:   19 136/83   19 136/79   19 123/70    Pulse Readings from Last 3 Encounters:   19 123   19 88   19 145      Resp Readings from Last 3 Encounters:   19 18   19 18   19 18    SpO2 Readings from Last 3 Encounters:   No data found for SpO2      Temp Readings from Last 1 Encounters:   19  "36.6  C (97.9  F) (Oral)    Ht Readings from Last 1 Encounters:   02/18/19 1.702 m (5' 7\")      Wt Readings from Last 1 Encounters:   02/18/19 132.9 kg (293 lb)    Estimated body mass index is 45.89 kg/m  as calculated from the following:    Height as of this encounter: 1.702 m (5' 7\").    Weight as of this encounter: 132.9 kg (293 lb).       Anesthesia Plan      History & Physical Review  History and physical reviewed and following examination; no interval change.    ASA Status:  2 .        Plan for Epidural   PONV prophylaxis:  Ondansetron (or other 5HT-3) and Dexamethasone or Solumedrol       Postoperative Care  Postoperative pain management:  Multi-modal analgesia.      Consents  Anesthetic plan, risks, benefits and alternatives discussed with:  Patient..                 AP Wright CRNA  "

## 2019-02-20 NOTE — PROGRESS NOTES
Intrapartum Progress Note    S: Comfortable.  Cook still in place.    O:  Vitals:    19 1530 19 1726 19 1930 19 2130   BP: 145/83 145/69 141/80 133/85   Pulse:       Resp: 18 18 18    Temp: 98.2  F (36.8  C) 98  F (36.7  C) 98  F (36.7  C) 98.3  F (36.8  C)   TempSrc: Oral Oral Oral Oral   Weight:       Height:         General: comfortable  Cervix:  Cook removed, AROM completed with moderate clear fluid.  2.5/70/-1    A/P: 23 year old  @ 37w3d here for IOL for cholestasis  - Labor: cytotec overnight, now cook catheter with 80/80 during the day, removed and now s/p AROM, continue pitocin  - Pain management: nothing for now  - GBS neg  - Anticipate IOL >     Clementina Salgado MD  [facility] OB/Gyn

## 2019-02-20 NOTE — PLAN OF CARE
Difficult to monitor pt due to body habitus.  Pt has deisy monitor in place and that is even difficult to trace continuous FHT's.

## 2019-02-20 NOTE — PROGRESS NOTES
Patient was wishing to ambulate so we set up the mini telemetry.  While toco was functioning, fetal ekg would not . Extensive trouble shooting attempts were finally successful, but then the scalp electrode failed again.  It was determined that it needed to be replaced.  Patient desired to try US again so that she could ambulate, but due to body habitus, this was ineffective.  As patient returned to her room, US did maintain tracing, so patient elected to wait on fetal scalp replacement.  Will monitor closely and as long as we are able to maintain tracing will continue with US monitoring of FHR.  Patient and significant other in agreement with plan.

## 2019-02-20 NOTE — PROGRESS NOTES
"Dalila Galvez chart reviewed   IOL for cholestasis  S/p Cook balloon and Pitocin  AROM last night clear fluid @ 23:37  Afebrile   Draining clear fluid  Pitocin to 18 [employer] units/ min yesterday prior to discontinue for rest  Restarted Pitocin today to 14 [employer] units/ min presently    /71   Pulse 123   Temp 98.4  F (36.9  C) (Oral)   Resp 18   Ht 1.702 m (5' 7\")   Wt 132.9 kg (293 lb)   LMP 2018   BMI 45.89 kg/m      NAD  Chest clear  CV S1, S2 w/o Murmur  Abd - Gravid, nontender  SVE 4-5 /80/-2 posterior    A) 37+4 week IUP   IOL for Cholestasis  Prodromal phase of labor      P) continue Pitocin \  Observe for fever  Discussed the risks, and benefits of  delivery  Also indications as well      Francis Somers      "

## 2019-02-20 NOTE — PLAN OF CARE
Patient wants to be up on birthing ball.  Difficulty tracing contractions and FHTs while on ball.  External US and toco replaced with Ellen monitor.  Patient back on birthing ball.  Did not increase pitocin due to inadequate tracing.  Will monitor.

## 2019-02-21 ENCOUNTER — ANESTHESIA (OUTPATIENT)
Dept: SURGERY | Facility: CLINIC | Age: 24
End: 2019-02-21
Payer: COMMERCIAL

## 2019-02-21 ENCOUNTER — ANESTHESIA EVENT (OUTPATIENT)
Dept: SURGERY | Facility: CLINIC | Age: 24
End: 2019-02-21
Payer: COMMERCIAL

## 2019-02-21 PROBLEM — Z98.891 S/P CESAREAN SECTION: Status: ACTIVE | Noted: 2019-02-21

## 2019-02-21 LAB — BLOOD BANK CMNT PATIENT-IMP: NORMAL

## 2019-02-21 PROCEDURE — 25000128 H RX IP 250 OP 636: Performed by: OBSTETRICS & GYNECOLOGY

## 2019-02-21 PROCEDURE — 27110028 ZZH OR GENERAL SUPPLY NON-STERILE: Performed by: OBSTETRICS & GYNECOLOGY

## 2019-02-21 PROCEDURE — 59510 CESAREAN DELIVERY: CPT | Performed by: OBSTETRICS & GYNECOLOGY

## 2019-02-21 PROCEDURE — 25000128 H RX IP 250 OP 636: Performed by: NURSE ANESTHETIST, CERTIFIED REGISTERED

## 2019-02-21 PROCEDURE — 12000000 ZZH R&B MED SURG/OB

## 2019-02-21 PROCEDURE — 36000056 ZZH SURGERY LEVEL 3 1ST 30 MIN: Performed by: OBSTETRICS & GYNECOLOGY

## 2019-02-21 PROCEDURE — 37000009 ZZH ANESTHESIA TECHNICAL FEE, EACH ADDTL 15 MIN: Performed by: OBSTETRICS & GYNECOLOGY

## 2019-02-21 PROCEDURE — 71000013 ZZH RECOVERY PHASE 1 LEVEL 1 EA ADDTL HR: Performed by: OBSTETRICS & GYNECOLOGY

## 2019-02-21 PROCEDURE — 59514 CESAREAN DELIVERY ONLY: CPT | Mod: AS | Performed by: NURSE PRACTITIONER

## 2019-02-21 PROCEDURE — 25800030 ZZH RX IP 258 OP 636: Performed by: OBSTETRICS & GYNECOLOGY

## 2019-02-21 PROCEDURE — 25000125 ZZHC RX 250: Performed by: OBSTETRICS & GYNECOLOGY

## 2019-02-21 PROCEDURE — 36000058 ZZH SURGERY LEVEL 3 EA 15 ADDTL MIN: Performed by: OBSTETRICS & GYNECOLOGY

## 2019-02-21 PROCEDURE — 71000012 ZZH RECOVERY PHASE 1 LEVEL 1 FIRST HR: Performed by: OBSTETRICS & GYNECOLOGY

## 2019-02-21 PROCEDURE — 27210794 ZZH OR GENERAL SUPPLY STERILE: Performed by: OBSTETRICS & GYNECOLOGY

## 2019-02-21 PROCEDURE — 37000008 ZZH ANESTHESIA TECHNICAL FEE, 1ST 30 MIN: Performed by: OBSTETRICS & GYNECOLOGY

## 2019-02-21 PROCEDURE — 25000125 ZZHC RX 250: Performed by: NURSE ANESTHETIST, CERTIFIED REGISTERED

## 2019-02-21 PROCEDURE — 25000132 ZZH RX MED GY IP 250 OP 250 PS 637: Performed by: OBSTETRICS & GYNECOLOGY

## 2019-02-21 RX ORDER — ONDANSETRON 2 MG/ML
4 INJECTION INTRAMUSCULAR; INTRAVENOUS EVERY 6 HOURS PRN
Status: DISCONTINUED | OUTPATIENT
Start: 2019-02-21 | End: 2019-02-24 | Stop reason: HOSPADM

## 2019-02-21 RX ORDER — FENTANYL CITRATE 50 UG/ML
25-50 INJECTION, SOLUTION INTRAMUSCULAR; INTRAVENOUS
Status: DISCONTINUED | OUTPATIENT
Start: 2019-02-21 | End: 2019-02-21

## 2019-02-21 RX ORDER — SCOLOPAMINE TRANSDERMAL SYSTEM 1 MG/1
1 PATCH, EXTENDED RELEASE TRANSDERMAL
Status: DISCONTINUED | OUTPATIENT
Start: 2019-02-21 | End: 2019-02-21

## 2019-02-21 RX ORDER — SODIUM CHLORIDE, SODIUM LACTATE, POTASSIUM CHLORIDE, CALCIUM CHLORIDE 600; 310; 30; 20 MG/100ML; MG/100ML; MG/100ML; MG/100ML
INJECTION, SOLUTION INTRAVENOUS CONTINUOUS
Status: DISCONTINUED | OUTPATIENT
Start: 2019-02-21 | End: 2019-02-21

## 2019-02-21 RX ORDER — IBUPROFEN 800 MG/1
800 TABLET, FILM COATED ORAL EVERY 6 HOURS PRN
Status: DISCONTINUED | OUTPATIENT
Start: 2019-02-22 | End: 2019-02-21

## 2019-02-21 RX ORDER — CEFAZOLIN SODIUM 1 G/50ML
1 INJECTION, SOLUTION INTRAVENOUS EVERY 8 HOURS
Status: DISCONTINUED | OUTPATIENT
Start: 2019-02-21 | End: 2019-02-21

## 2019-02-21 RX ORDER — MEPERIDINE HYDROCHLORIDE 25 MG/ML
12.5 INJECTION INTRAMUSCULAR; INTRAVENOUS; SUBCUTANEOUS EVERY 5 MIN PRN
Status: DISCONTINUED | OUTPATIENT
Start: 2019-02-21 | End: 2019-02-21

## 2019-02-21 RX ORDER — AMOXICILLIN 250 MG
1 CAPSULE ORAL 2 TIMES DAILY PRN
Status: DISCONTINUED | OUTPATIENT
Start: 2019-02-21 | End: 2019-02-24 | Stop reason: HOSPADM

## 2019-02-21 RX ORDER — CETIRIZINE HYDROCHLORIDE 10 MG/1
10 TABLET ORAL DAILY
Status: DISCONTINUED | OUTPATIENT
Start: 2019-02-21 | End: 2019-02-24 | Stop reason: HOSPADM

## 2019-02-21 RX ORDER — IBUPROFEN 800 MG/1
800 TABLET, FILM COATED ORAL EVERY 6 HOURS PRN
Status: DISCONTINUED | OUTPATIENT
Start: 2019-02-21 | End: 2019-02-21

## 2019-02-21 RX ORDER — HYDROMORPHONE HYDROCHLORIDE 1 MG/ML
.3-.5 INJECTION, SOLUTION INTRAMUSCULAR; INTRAVENOUS; SUBCUTANEOUS EVERY 30 MIN PRN
Status: DISCONTINUED | OUTPATIENT
Start: 2019-02-21 | End: 2019-02-24 | Stop reason: HOSPADM

## 2019-02-21 RX ORDER — KETOROLAC TROMETHAMINE 30 MG/ML
30 INJECTION, SOLUTION INTRAMUSCULAR; INTRAVENOUS EVERY 6 HOURS
Status: DISCONTINUED | OUTPATIENT
Start: 2019-02-21 | End: 2019-02-21

## 2019-02-21 RX ORDER — MORPHINE SULFATE 1 MG/ML
INJECTION, SOLUTION EPIDURAL; INTRATHECAL; INTRAVENOUS PRN
Status: DISCONTINUED | OUTPATIENT
Start: 2019-02-21 | End: 2019-02-21

## 2019-02-21 RX ORDER — CEFAZOLIN SODIUM IN 0.9 % NACL 3 G/100 ML
3 INTRAVENOUS SOLUTION, PIGGYBACK (ML) INTRAVENOUS
Status: COMPLETED | OUTPATIENT
Start: 2019-02-21 | End: 2019-02-21

## 2019-02-21 RX ORDER — ONDANSETRON 4 MG/1
4 TABLET, ORALLY DISINTEGRATING ORAL EVERY 30 MIN PRN
Status: DISCONTINUED | OUTPATIENT
Start: 2019-02-21 | End: 2019-02-21

## 2019-02-21 RX ORDER — CITRIC ACID/SODIUM CITRATE 334-500MG
30 SOLUTION, ORAL ORAL
Status: COMPLETED | OUTPATIENT
Start: 2019-02-21 | End: 2019-02-21

## 2019-02-21 RX ORDER — DEXTROSE, SODIUM CHLORIDE, SODIUM LACTATE, POTASSIUM CHLORIDE, AND CALCIUM CHLORIDE 5; .6; .31; .03; .02 G/100ML; G/100ML; G/100ML; G/100ML; G/100ML
INJECTION, SOLUTION INTRAVENOUS CONTINUOUS
Status: DISCONTINUED | OUTPATIENT
Start: 2019-02-21 | End: 2019-02-24 | Stop reason: HOSPADM

## 2019-02-21 RX ORDER — AMOXICILLIN 250 MG
2 CAPSULE ORAL 2 TIMES DAILY PRN
Status: DISCONTINUED | OUTPATIENT
Start: 2019-02-21 | End: 2019-02-24 | Stop reason: HOSPADM

## 2019-02-21 RX ORDER — MAGNESIUM HYDROXIDE 1200 MG/15ML
LIQUID ORAL PRN
Status: DISCONTINUED | OUTPATIENT
Start: 2019-02-21 | End: 2019-02-24 | Stop reason: HOSPADM

## 2019-02-21 RX ORDER — NALOXONE HYDROCHLORIDE 0.4 MG/ML
.1-.4 INJECTION, SOLUTION INTRAMUSCULAR; INTRAVENOUS; SUBCUTANEOUS
Status: DISCONTINUED | OUTPATIENT
Start: 2019-02-21 | End: 2019-02-24 | Stop reason: HOSPADM

## 2019-02-21 RX ORDER — OXYTOCIN/0.9 % SODIUM CHLORIDE 30/500 ML
340 PLASTIC BAG, INJECTION (ML) INTRAVENOUS CONTINUOUS PRN
Status: DISCONTINUED | OUTPATIENT
Start: 2019-02-21 | End: 2019-02-24 | Stop reason: HOSPADM

## 2019-02-21 RX ORDER — NALOXONE HYDROCHLORIDE 0.4 MG/ML
.1-.4 INJECTION, SOLUTION INTRAMUSCULAR; INTRAVENOUS; SUBCUTANEOUS
Status: DISCONTINUED | OUTPATIENT
Start: 2019-02-21 | End: 2019-02-21

## 2019-02-21 RX ORDER — OXYTOCIN/0.9 % SODIUM CHLORIDE 30/500 ML
100 PLASTIC BAG, INJECTION (ML) INTRAVENOUS CONTINUOUS
Status: DISCONTINUED | OUTPATIENT
Start: 2019-02-21 | End: 2019-02-24 | Stop reason: HOSPADM

## 2019-02-21 RX ORDER — HYDRALAZINE HYDROCHLORIDE 20 MG/ML
2.5-5 INJECTION INTRAMUSCULAR; INTRAVENOUS EVERY 10 MIN PRN
Status: DISCONTINUED | OUTPATIENT
Start: 2019-02-21 | End: 2019-02-21

## 2019-02-21 RX ORDER — SIMETHICONE 80 MG
80 TABLET,CHEWABLE ORAL 4 TIMES DAILY PRN
Status: DISCONTINUED | OUTPATIENT
Start: 2019-02-21 | End: 2019-02-24 | Stop reason: HOSPADM

## 2019-02-21 RX ORDER — LIDOCAINE HCL/EPINEPHRINE/PF 2%-1:200K
VIAL (ML) INJECTION PRN
Status: DISCONTINUED | OUTPATIENT
Start: 2019-02-21 | End: 2019-02-21

## 2019-02-21 RX ORDER — ONDANSETRON 2 MG/ML
4 INJECTION INTRAMUSCULAR; INTRAVENOUS EVERY 30 MIN PRN
Status: DISCONTINUED | OUTPATIENT
Start: 2019-02-21 | End: 2019-02-21

## 2019-02-21 RX ORDER — OXYCODONE HYDROCHLORIDE 5 MG/1
5-10 TABLET ORAL
Status: DISCONTINUED | OUTPATIENT
Start: 2019-02-21 | End: 2019-02-24 | Stop reason: HOSPADM

## 2019-02-21 RX ORDER — ACETAMINOPHEN 325 MG/1
650 TABLET ORAL EVERY 4 HOURS PRN
Status: DISCONTINUED | OUTPATIENT
Start: 2019-02-24 | End: 2019-02-24 | Stop reason: HOSPADM

## 2019-02-21 RX ORDER — CEFAZOLIN SODIUM 1 G/50ML
1 INJECTION, SOLUTION INTRAVENOUS SEE ADMIN INSTRUCTIONS
Status: DISCONTINUED | OUTPATIENT
Start: 2019-02-21 | End: 2019-02-21

## 2019-02-21 RX ORDER — ACETAMINOPHEN 325 MG/1
975 TABLET ORAL EVERY 8 HOURS
Status: COMPLETED | OUTPATIENT
Start: 2019-02-21 | End: 2019-02-24

## 2019-02-21 RX ORDER — KETOROLAC TROMETHAMINE 30 MG/ML
INJECTION, SOLUTION INTRAMUSCULAR; INTRAVENOUS PRN
Status: DISCONTINUED | OUTPATIENT
Start: 2019-02-21 | End: 2019-02-21

## 2019-02-21 RX ORDER — DIPHENHYDRAMINE HCL 25 MG
25 CAPSULE ORAL EVERY 6 HOURS PRN
Status: DISCONTINUED | OUTPATIENT
Start: 2019-02-21 | End: 2019-02-24 | Stop reason: HOSPADM

## 2019-02-21 RX ORDER — LANOLIN 100 %
OINTMENT (GRAM) TOPICAL
Status: DISCONTINUED | OUTPATIENT
Start: 2019-02-21 | End: 2019-02-24 | Stop reason: HOSPADM

## 2019-02-21 RX ORDER — DEXAMETHASONE SODIUM PHOSPHATE 4 MG/ML
INJECTION, SOLUTION INTRA-ARTICULAR; INTRALESIONAL; INTRAMUSCULAR; INTRAVENOUS; SOFT TISSUE PRN
Status: DISCONTINUED | OUTPATIENT
Start: 2019-02-21 | End: 2019-02-21

## 2019-02-21 RX ORDER — OXYTOCIN 10 [USP'U]/ML
INJECTION, SOLUTION INTRAMUSCULAR; INTRAVENOUS PRN
Status: DISCONTINUED | OUTPATIENT
Start: 2019-02-21 | End: 2019-02-24 | Stop reason: HOSPADM

## 2019-02-21 RX ORDER — BISACODYL 10 MG
10 SUPPOSITORY, RECTAL RECTAL DAILY PRN
Status: DISCONTINUED | OUTPATIENT
Start: 2019-02-23 | End: 2019-02-24 | Stop reason: HOSPADM

## 2019-02-21 RX ORDER — LIDOCAINE 40 MG/G
CREAM TOPICAL
Status: DISCONTINUED | OUTPATIENT
Start: 2019-02-21 | End: 2019-02-24 | Stop reason: HOSPADM

## 2019-02-21 RX ORDER — OXYTOCIN 10 [USP'U]/ML
10 INJECTION, SOLUTION INTRAMUSCULAR; INTRAVENOUS
Status: DISCONTINUED | OUTPATIENT
Start: 2019-02-21 | End: 2019-02-24 | Stop reason: HOSPADM

## 2019-02-21 RX ORDER — HYDROCORTISONE 2.5 %
CREAM (GRAM) TOPICAL 3 TIMES DAILY PRN
Status: DISCONTINUED | OUTPATIENT
Start: 2019-02-21 | End: 2019-02-24 | Stop reason: HOSPADM

## 2019-02-21 RX ORDER — IBUPROFEN 800 MG/1
800 TABLET, FILM COATED ORAL EVERY 6 HOURS PRN
Status: DISCONTINUED | OUTPATIENT
Start: 2019-02-21 | End: 2019-02-24 | Stop reason: HOSPADM

## 2019-02-21 RX ADMIN — SENNOSIDES AND DOCUSATE SODIUM 1 TABLET: 8.6; 5 TABLET ORAL at 20:46

## 2019-02-21 RX ADMIN — KETOROLAC TROMETHAMINE 30 MG: 30 INJECTION, SOLUTION INTRAMUSCULAR at 11:25

## 2019-02-21 RX ADMIN — CEFAZOLIN SODIUM 1 G: 1 INJECTION, SOLUTION INTRAVENOUS at 04:28

## 2019-02-21 RX ADMIN — Medication 3 G: at 03:45

## 2019-02-21 RX ADMIN — KETOROLAC TROMETHAMINE 30 MG: 30 INJECTION, SOLUTION INTRAMUSCULAR at 05:07

## 2019-02-21 RX ADMIN — IBUPROFEN 800 MG: 800 TABLET ORAL at 17:02

## 2019-02-21 RX ADMIN — SODIUM CHLORIDE, POTASSIUM CHLORIDE, SODIUM LACTATE AND CALCIUM CHLORIDE: 600; 310; 30; 20 INJECTION, SOLUTION INTRAVENOUS at 01:33

## 2019-02-21 RX ADMIN — OXYCODONE HYDROCHLORIDE 5 MG: 5 TABLET ORAL at 22:41

## 2019-02-21 RX ADMIN — ONDANSETRON 4 MG: 2 INJECTION INTRAMUSCULAR; INTRAVENOUS at 04:10

## 2019-02-21 RX ADMIN — OXYTOCIN-SODIUM CHLORIDE 0.9% IV SOLN 30 UNIT/500ML 100 ML: 30-0.9/5 SOLUTION at 04:45

## 2019-02-21 RX ADMIN — AZITHROMYCIN 500 MG: 500 INJECTION, POWDER, LYOPHILIZED, FOR SOLUTION INTRAVENOUS at 03:55

## 2019-02-21 RX ADMIN — ACETAMINOPHEN 975 MG: 325 TABLET, FILM COATED ORAL at 06:23

## 2019-02-21 RX ADMIN — ONDANSETRON 4 MG: 2 INJECTION INTRAMUSCULAR; INTRAVENOUS at 04:49

## 2019-02-21 RX ADMIN — DEXAMETHASONE SODIUM PHOSPHATE 8 MG: 4 INJECTION, SOLUTION INTRA-ARTICULAR; INTRALESIONAL; INTRAMUSCULAR; INTRAVENOUS; SOFT TISSUE at 04:49

## 2019-02-21 RX ADMIN — ACETAMINOPHEN 975 MG: 325 TABLET, FILM COATED ORAL at 16:16

## 2019-02-21 RX ADMIN — LIDOCAINE HYDROCHLORIDE,EPINEPHRINE BITARTRATE 20 ML: 20; .005 INJECTION, SOLUTION EPIDURAL; INFILTRATION; INTRACAUDAL; PERINEURAL at 04:24

## 2019-02-21 RX ADMIN — IBUPROFEN 800 MG: 800 TABLET ORAL at 23:20

## 2019-02-21 RX ADMIN — SODIUM CHLORIDE, POTASSIUM CHLORIDE, SODIUM LACTATE AND CALCIUM CHLORIDE: 600; 310; 30; 20 INJECTION, SOLUTION INTRAVENOUS at 02:55

## 2019-02-21 RX ADMIN — OXYTOCIN-SODIUM CHLORIDE 0.9% IV SOLN 30 UNIT/500ML 340 MILLI-UNITS/MIN: 30-0.9/5 SOLUTION at 05:48

## 2019-02-21 RX ADMIN — MORPHINE SULFATE 4 MG: 1 INJECTION, SOLUTION EPIDURAL; INTRATHECAL; INTRAVENOUS at 04:48

## 2019-02-21 RX ADMIN — CETIRIZINE HYDROCHLORIDE 10 MG: 10 TABLET, FILM COATED ORAL at 10:19

## 2019-02-21 RX ADMIN — SODIUM CITRATE AND CITRIC ACID MONOHYDRATE 30 ML: 500; 334 SOLUTION ORAL at 03:47

## 2019-02-21 RX ADMIN — AZITHROMYCIN 500 MG: 500 INJECTION, POWDER, LYOPHILIZED, FOR SOLUTION INTRAVENOUS at 04:58

## 2019-02-21 RX ADMIN — OXYTOCIN-SODIUM CHLORIDE 0.9% IV SOLN 30 UNIT/500ML 400 ML: 30-0.9/5 SOLUTION at 05:24

## 2019-02-21 NOTE — PLAN OF CARE
Patient is comfortable with epidural, denies pain with contractions. Contractions occurring every 3-4 minutes and lasting 60 seconds. FHT category 2 with late decels x2 that resolved without intervention. Pitocin infusing at 18mu/min. Patient's SO and mother at bedside and very supportive.

## 2019-02-21 NOTE — OP NOTE
[facility] Obstetrics Operative Note    Pre-operative diagnosis: arrest of progress  37+5 week IUP   Post-operative diagnosis: Same   Procedure: Primary low transverse  section   Surgeon: Francis Somers MD   Assistant(s): Sharla Cruz NP  A surgical assistant was required for this surgery for  her experience with retraction, achievement of hemostasis, and wound closure     Anesthesia: Epidural anesthesia   Quantitative blood loss: 587 ml   Total IV fluids: (See anesthesia record)  1500 ml   Blood transfusion: No transfusion was given during surgery   Total urine output: (See anesthesia record)  125 ml   Drains: Benson catheter   Specimens: none   Findings: Live   Male  Cephalic presentation:  left occiput posterior  APGARS: 1 minute: 7   5 minute: 9  Weight: 7# 4 oz   Placenta: nl  Tubes: nl  Uterus: nl  Ovaries: nl   Complications: None   Condition: Infant unstable, transferred to special care nursery  Mother stable, transfered to post-anesthesia recovery   Comments: Dalila Galvez   1995  [id number]      Dalila Galvez  presented for the above procedure.  She has Cholestasis of pregnancy @ 37+5 weeks EGA, is s/p Cook balloon and pitocin IOL  I met with Dalila  and her , and mother and discussed the planned procedure as well as the expected post operative course.  Risks of complications were noted and postoperative signs to watch for outlined.  Questions were answered and consent signed.  She was taken to the OR @ [facility] where she was placed in the supine position. She underwent re-dosing of her epidural.  She was then placed in the Supine, Left lateral tilt position where she was prepped and draped  Fetal heart tones were auscultated that showed:   FHR= 140  She was prepped and draped.  A timeout was held confirming her identity and proposed procedures. All were in agreement.     After adequate anesthesia was documented a Pfannenstiel incision was made in the  incision carried through subcutaneous tissue to the fascia.  The fascia was transversely incised.  The fascia was freed from the underlying rectus muscles in cephalad and caudad direction.  Midline diastases was exploited the underlying peritoneum grasped tented and entered without difficulty.  A bladder blade was placed.  The bladder reflection was high on the lower uterine segment.  This was taken down sharply.  Low segment transverse incision was made carried out to the membranes ruptured productive of clear fluid.  Uterine incision was extended by finger fracture technique.  The fetal head elevated into the wound and delivered at 4:41 AM viable vigorous male.  Delayed cord clamping was performed cord doubly clamped and cut  handed to the nurse in attendance.  Weight was 7 pounds 4 ounces Apgars were 7 and 9.  Sharla Cruz subsequently went to the  warmer to attend to the  as he was having tachycardia.    Cord blood was obtained.  She is Rh-.  Placenta was delivered manually from the posterior wall of the uterus the uterus exteriorized.  The endometrium soft curetted with a lap sponge and clots and debris were removed.  The uterus was closed primarily with a running locked 0 Vicryl suture.  There was an extension on the left laterally.  This was closed.  Once the hysterotomy was closed, the closure was reinforced with an imbricating suture of 0 Vicryl.    Hemostasis was assured at this point time.    The abdomen pelvis were washed with warm normal saline uterus returned to its anatomic position.  Hemostasis was again assured.  The peritoneum was closed with running 2-0 Vicryl.  The rectus muscle reapproximated midline using interrupted 2-0 Vicryl.  The fascia was then closed from the apices to the midline and tied individually with #1 Vicryl.  Subcu was washed with warm normal saline and hemostasis assured.  Subcu was closed with interrupted 0 chromic gut and the skin reapproximated  utilizing 3- 0V lock suture and vizcarra set was applied.  Sponge needle counts were correct.  Instrument counts were correct.   subsequently went to the special care nursery.  The patient was then taken to the PACU in good condition.    Francis Somers

## 2019-02-21 NOTE — ANESTHESIA POSTPROCEDURE EVALUATION
Patient: Dalila Galvez    * No procedures listed *    Diagnosis:* No pre-op diagnosis entered *  Diagnosis Additional Information: No value filed.    Anesthesia Type:  Epidural    Note:  Anesthesia Post Evaluation    Patient location during evaluation: Bedside  Patient participation: Able to fully participate in evaluation  Level of consciousness: awake and alert  Pain management: adequate  Airway patency: patent  Cardiovascular status: acceptable  Respiratory status: acceptable  Hydration status: acceptable  PONV: none     Anesthetic complications: None          Last vitals:  Vitals:    02/21/19 0630 02/21/19 0640 02/21/19 0650   BP: 147/88  135/88   Pulse: 93     Resp: 18     Temp:      SpO2: 98% 97% 95%         Electronically Signed By: Lonnie Terrazas CRNA, APRN CRNA  February 21, 2019  6:54 AM

## 2019-02-21 NOTE — PROGRESS NOTES
S:Delivery  B:Induced  Labor,37w5d    Lab Results   Component Value Date    GBS Negative 2019    with antibiotic treatment not indicated 4 hours prior to delivery.  A: Patient delivered Primary C/S for  arrest of progress at 0441 with Dr. NICOLE Somers in attendance and baby placed on mother's abdomen for delayed cord clamping. Baby received from surgeon. Baby to warmer for assessment/resusitative efforts..  Apgars 7/9.  IV infusion of Oxytocin  infused. Placenta removal manual. MD does not want placenta sent to pathology.  See Flowsheet for VS and PP checks.  .  Labor care plan goals met, transition now to postpartum care.  R: Expect routine postpartum care. Prior discussion with mother indicates that feeding plan is Breast feeding . Educated mother on importance of exclusive breastfeeding, expected feeding readiness cues and encouraged her to observe for these cues while rooming in. Informed her that breastfeeding assistance would be provided.

## 2019-02-21 NOTE — PLAN OF CARE
FHT with subtle late decelerations. Interventions include repositioning patient and IVF bolus. Decelerations stopped with interventions. Variability minimal, patient given apple juice.

## 2019-02-21 NOTE — PROGRESS NOTES
Mother transferred to postpartum room at 0530 via bed and infant in special care nursery for post  section phase 1 recovery period. Patient oriented to room. Mother stable condition upon transfer.

## 2019-02-21 NOTE — PLAN OF CARE
Patient resting with eyes closed upon entering room.  Moved from back to left lilt.  Patient had taken oxygen mask off.  Declined use of peanut ball.  Reports feeling steady pressure on perineum.

## 2019-02-21 NOTE — PROGRESS NOTES
Maternal and fetal heart rate rising.  Fetal heart tones with minimal variability.  Cervix starting to thicken. Dr. Magnusson called to bedside to assess patient.  Call made to delivery via . OR personnel called.

## 2019-02-21 NOTE — ANESTHESIA POSTPROCEDURE EVALUATION
Patient: Dalila Galvez    Procedure(s):   SECTION    Diagnosis:arrest of progress  Diagnosis Additional Information: No value filed.    Anesthesia Type:  Epidural    Note:  Anesthesia Post Evaluation    Patient location during evaluation: Bedside  Patient participation: Able to fully participate in evaluation  Level of consciousness: awake  Pain management: adequate  Airway patency: patent  Cardiovascular status: acceptable  Respiratory status: acceptable  Hydration status: stable  PONV: none     Anesthetic complications: None          Last vitals:  Vitals:    19 0600 19 0615 19 0623   BP: 136/75 141/80    Pulse: 98 96    Resp: 20 20    Temp:   36.7  C (98.1  F)   SpO2: 97% 98%          Electronically Signed By: AP Morelos CRNA  2019  6:33 AM

## 2019-02-21 NOTE — L&D DELIVERY NOTE
"Dalila Galvez is a 23 year old  @ 37+5 Weeks EGA  She presented for IOL on 2019  She underwent Cook Balloon and pitocin to max of 18 [employer] units/ min   Pregnancy was complicated by Cholestasis of pregnancy  GBS neg  Membrane status: AROM @ 23:37 on 2019  Labored with:   Pain meds Epidural   FHR Cat II  Delivered a viable male  @ 04:41 via LST CS   Weight 7#4 oz   APGAR's 7/9  Placenta delivered @ 04:42 , was complete with a 3vessel cord  See op report   ml  \"\"    Francis Somers      "

## 2019-02-21 NOTE — ANESTHESIA PROCEDURE NOTES
Peripheral nerve/Neuraxial procedure note : epidural catheter  Pre-Procedure  Performed by  Leyda Bynum APRN CRNA   Location: OB      Pre-Anesthestic Checklist: patient identified, IV checked, risks and benefits discussed, informed consent, monitors and equipment checked and pre-op evaluation    Timeout  Correct Patient: Yes   Correct Procedure: Yes   Correct Site: Yes   Correct Laterality: N/A   Correct Position: Yes   Site Marked: N/A   .   Procedure Documentation    .    Procedure:    Epidural catheter.  Insertion Site:L2-3, L3-4  (midline approach) Injection technique: LORT saline and LORT air   Local skin infiltrated with 10 mL of 1% lidocaine.  CARMEN at 9 cm     Patient Prep;mask, sterile gloves, patient draped.  .  Needle: Touhy needle Needle Gauge: 17.    Needle Length (Inches) 3.5  # of attempts: 1 and  # of redirects:  1 .   Catheter: 19 G . .  Catheter threaded easily  6 cm epidural space.  15 cm at skin.   .    Assessment/Narrative  Paresthesias: No.  .  .  Aspiration negative for heme or CSF  . Test dose of 5 mL lidocaine 1.5% w/ 1:200,000 epinephrine at 18:39.  Test dose negative for signs of intravascular, subdural or intrathecal injection. Comments:  Pain prior to epidural: 8/10  Pain after epidural: 2/10    VSS, FHT stable throughout

## 2019-02-21 NOTE — ANESTHESIA CARE TRANSFER NOTE
Patient: Dalila Galvez    Procedure(s):   SECTION    Diagnosis: arrest of progress  Diagnosis Additional Information: No value filed.    Anesthesia Type:   Epidural     Note:  Airway :Room Air  Patient transferred to:Labor and Delivery  Handoff Report: Identifed the Patient, Identified the Reponsible Provider, Reviewed the pertinent medical history, Discussed the surgical course, Reviewed Intra-OP anesthesia mangement and issues during anesthesia, Set expectations for post-procedure period and Allowed opportunity for questions and acknowledgement of understanding      Vitals: (Last set prior to Anesthesia Care Transfer)    CRNA VITALS  2019 0456 - 2019 0529      2019             Pulse:  107    SpO2:  94 %                Electronically Signed By: AP Morelos CRNA  2019  5:29 AM

## 2019-02-21 NOTE — ANESTHESIA CARE TRANSFER NOTE
Patient: Dalila Galvez    * No procedures listed *    Diagnosis: * No pre-op diagnosis entered *  Diagnosis Additional Information: No value filed.    Anesthesia Type:   Epidural     Note:    Patient transferred to:Labor and Delivery  Handoff Report: Identifed the Patient, Identified the Reponsible Provider, Reviewed the pertinent medical history, Discussed the surgical course, Reviewed Intra-OP anesthesia mangement and issues during anesthesia, Set expectations for post-procedure period and Allowed opportunity for questions and acknowledgement of understanding      Vitals: (Last set prior to Anesthesia Care Transfer)              Electronically Signed By: Lonnie Terrazas CRNA, APRN CRNA  February 21, 2019  6:54 AM

## 2019-02-21 NOTE — PLAN OF CARE
Patient reports feeling less pressure, no pain.  Fetal heart tones back to a normal range.  Dr. Somers on the unit.  Will continue to monitor.

## 2019-02-21 NOTE — ANESTHESIA PREPROCEDURE EVALUATION
Anesthesia Pre-Procedure Evaluation    Patient: Dalila Galvez   MRN: [id number] : 1995          Preoperative Diagnosis: * No surgery found *        Past Medical History:   Diagnosis Date     Chickenpox      Past Surgical History:   Procedure Laterality Date     NO HISTORY OF SURGERY         Anesthesia Evaluation     .             ROS/MED HX    ENT/Pulmonary:     (+)DELIA risk factors obese, , . .    Neurologic:  - neg neurologic ROS     Cardiovascular:         METS/Exercise Tolerance:     Hematologic:  - neg hematologic  ROS       Musculoskeletal:  - neg musculoskeletal ROS       GI/Hepatic:     (+) cholecystitis/cholelithiasis,       Renal/Genitourinary:  - ROS Renal section negative       Endo:     (+) Obesity (morbid), .      Psychiatric:  - neg psychiatric ROS       Infectious Disease:         Malignancy:      - no malignancy   Other:                            Physical Exam  Normal systems: cardiovascular, pulmonary and dental    Airway   Mallampati: III  TM distance: >3 FB  Neck ROM: full    Dental     Cardiovascular       Pulmonary             Lab Results   Component Value Date    WBC 8.8 2019    HGB 11.8 2019    HCT 36.3 2019     2019     2019    POTASSIUM 3.7 2019    CHLORIDE 107 2019    CO2 21 2019    BUN 6 (L) 2019    CR 0.54 2019    GLC 92 2019    STEVEN 9.2 2019    ALBUMIN 2.6 (L) 2019    PROTTOTAL 6.4 (L) 2019    ALT 32 2019    AST 22 2019    ALKPHOS 148 2019    BILITOTAL 0.3 2019       Preop Vitals  BP Readings from Last 3 Encounters:   19 143/83   19 136/79   19 123/70    Pulse Readings from Last 3 Encounters:   19 123   19 88   19 145      Resp Readings from Last 3 Encounters:   19 20   19 18   19 18    SpO2 Readings from Last 3 Encounters:   19 96%      Temp Readings from Last 1 Encounters:   19 37  C  "(98.6  F) (Oral)    Ht Readings from Last 1 Encounters:   02/18/19 1.702 m (5' 7\")      Wt Readings from Last 1 Encounters:   02/18/19 132.9 kg (293 lb)    Estimated body mass index is 45.89 kg/m  as calculated from the following:    Height as of 2/18/19: 1.702 m (5' 7\").    Weight as of 2/18/19: 132.9 kg (293 lb).       Anesthesia Plan      History & Physical Review  History and physical reviewed and following examination; no interval change.    ASA Status:  2 .        Plan for Epidural   PONV prophylaxis:  Ondansetron (or other 5HT-3) and Dexamethasone or Solumedrol       Postoperative Care  Postoperative pain management:  Multi-modal analgesia.      Consents  Anesthetic plan, risks, benefits and alternatives discussed with:  Patient..                   AP Morelos CRNA  "

## 2019-02-21 NOTE — PROGRESS NOTES
FHT 190s for the past 10-15 minutes. Pt temp 98.5 orally, . Pitocin off, FF running, O2 on at 10L, pt repositioned to both sides. Dr Magnusson called in to assess pt.     Julia Peters RN 2/21/2019 12:50 AM

## 2019-02-21 NOTE — PROVIDER NOTIFICATION
02/20/19 4851   Vaginal Exam   Method sterile exam per RN   Cervical Position anterior   Cervical Consistency soft   Cervical Dilation (cm) 7-8   Cervical Effacement %   Fetal Station 0   Patient c/o increasing pressure and pain in vagina. Is making cervical change and SVE 7.5/95/0. Patient repositioning frequently and using epidural PCA without relief. CRNA called to administer bolus.

## 2019-02-21 NOTE — PROGRESS NOTES
"Pt with IOL pitocin has been off since MN   I was called in to assess at that time and there was reassuring FHT and progress in that she was -1 station and 9 cm.  I remained on the unit.  I was called @ 03:02 with concerns about fetal HR elevation with Cat II tracing  And thickening of the cervix with maternal tachycardia.  /83   Pulse 123   Temp 98.6  F (37  C) (Oral)   Resp 20   Ht 1.702 m (5' 7\")   Wt 132.9 kg (293 lb)   LMP 2018   SpO2 96%   BMI 45.89 kg/m    SVE caput with thickening cervix @ -2 station  There was an acceleration in the fetal heart tones with scalp stimulation.    I discussed the risks and benefits of a  delivery    I described the procedures as indicated above. The types of anesthesia were reviewed. The pre and post-op expectations were noted. We discussed the risks and benefits of the above procedures. The risk of bleeding, infection and damage to other organs was reviewed. The possibility of much larger incision(s) was discussed.  No guarantees were made.  She did express understanding and desires to proceed with surgery.      A) 37+5 week IUP  Cholestasis of pregnancy  Arrest of progress in labor   Suspected CPD      P)  delivery    Francis Somers      "

## 2019-02-22 LAB — HGB BLD-MCNC: 11 G/DL (ref 11.7–15.7)

## 2019-02-22 PROCEDURE — 85018 HEMOGLOBIN: CPT | Performed by: OBSTETRICS & GYNECOLOGY

## 2019-02-22 PROCEDURE — 25000132 ZZH RX MED GY IP 250 OP 250 PS 637: Performed by: OBSTETRICS & GYNECOLOGY

## 2019-02-22 PROCEDURE — 12000000 ZZH R&B MED SURG/OB

## 2019-02-22 PROCEDURE — 86900 BLOOD TYPING SEROLOGIC ABO: CPT | Performed by: OBSTETRICS & GYNECOLOGY

## 2019-02-22 PROCEDURE — 85461 HEMOGLOBIN FETAL: CPT | Performed by: OBSTETRICS & GYNECOLOGY

## 2019-02-22 PROCEDURE — 86901 BLOOD TYPING SEROLOGIC RH(D): CPT | Performed by: OBSTETRICS & GYNECOLOGY

## 2019-02-22 PROCEDURE — 36415 COLL VENOUS BLD VENIPUNCTURE: CPT | Performed by: OBSTETRICS & GYNECOLOGY

## 2019-02-22 RX ADMIN — OXYCODONE HYDROCHLORIDE 5 MG: 5 TABLET ORAL at 11:30

## 2019-02-22 RX ADMIN — IBUPROFEN 800 MG: 800 TABLET ORAL at 11:30

## 2019-02-22 RX ADMIN — ACETAMINOPHEN 975 MG: 325 TABLET, FILM COATED ORAL at 16:54

## 2019-02-22 RX ADMIN — OXYCODONE HYDROCHLORIDE 10 MG: 5 TABLET ORAL at 16:54

## 2019-02-22 RX ADMIN — IBUPROFEN 800 MG: 800 TABLET ORAL at 05:25

## 2019-02-22 RX ADMIN — OXYCODONE HYDROCHLORIDE 5 MG: 5 TABLET ORAL at 02:13

## 2019-02-22 RX ADMIN — ACETAMINOPHEN 975 MG: 325 TABLET, FILM COATED ORAL at 01:05

## 2019-02-22 RX ADMIN — SENNOSIDES AND DOCUSATE SODIUM 1 TABLET: 8.6; 5 TABLET ORAL at 08:26

## 2019-02-22 RX ADMIN — CETIRIZINE HYDROCHLORIDE 10 MG: 10 TABLET, FILM COATED ORAL at 08:26

## 2019-02-22 RX ADMIN — IBUPROFEN 800 MG: 800 TABLET ORAL at 19:13

## 2019-02-22 RX ADMIN — OXYCODONE HYDROCHLORIDE 5 MG: 5 TABLET ORAL at 08:26

## 2019-02-22 RX ADMIN — SENNOSIDES AND DOCUSATE SODIUM 2 TABLET: 8.6; 5 TABLET ORAL at 20:17

## 2019-02-22 RX ADMIN — OXYCODONE HYDROCHLORIDE 10 MG: 5 TABLET ORAL at 20:17

## 2019-02-22 RX ADMIN — OXYCODONE HYDROCHLORIDE 5 MG: 5 TABLET ORAL at 05:25

## 2019-02-22 RX ADMIN — ACETAMINOPHEN 975 MG: 325 TABLET, FILM COATED ORAL at 09:16

## 2019-02-22 NOTE — PLAN OF CARE
VSS, postpartum assessment WDL.  Pt ambulating independently in room - gait steady, denies dizziness.  Benson removed at 0420. Pt voided x1.  Tolerating regular diet.   Fundus firm, midline, U/U; small amount of bleeding - no clots.  Incision clean and dry; no bruising, no drainage.    Pt reported pain in incision overnight.  PRN ibuprofen and oxycodone and scheduled tylenol administered for pain per order. Pt able to sleep between cares.  Reported pain adequately managed with medication.   Pt breastfeeding infant with use of shield; pumping as well.  Bonding appropriately with infant.  Spouse present and attentive.   Plan to discharge home 2/24/19

## 2019-02-22 NOTE — PLAN OF CARE
Pt using ibuprofen and tylenol for discomfort with good relief.  Dalila was up in chair and room with assist and tolerated well.  Tolerating food and fluids.  Able to sleep a couple hours this afternoon.  Benson in until next time up.  Infant to room around 1830pm.  T

## 2019-02-22 NOTE — PROGRESS NOTES
"[facility] Daily Postpartum Note    S: Ms. Galvez is feeling ok this morning. She denies any complaints, just overall exhausted from a long induction. Happy her baby \"Elias\" is here and safe.. Her pain is well-controlled on oral pain medications. She tolerating a regular diet without nausea or vomiting. Ambulating without difficult. No flatus or BM. Lochia is scant. Voiding spontaneously. Breastfeeding without questions or concerns.    O:   VS:   Patient Vitals for the past 24 hrs:   BP Temp Temp src Pulse Heart Rate Resp SpO2   02/22/19 0820 115/52 98.6  F (37  C) Oral -- 87 16 --   02/22/19 0416 129/54 98  F (36.7  C) -- 88 -- 18 --   02/21/19 2330 108/59 97.9  F (36.6  C) Oral 82 -- 18 --   02/21/19 2048 113/56 98.6  F (37  C) Oral 88 -- 18 --   02/21/19 1637 108/61 98.3  F (36.8  C) Oral 77 -- -- 97 %   02/21/19 1130 128/70 98.2  F (36.8  C) Oral 91 -- 18 --   02/21/19 1030 132/80 -- -- 92 -- 18 97 %   02/21/19 0929 129/71 -- -- 100 -- -- 92 %     General: resting in bed, in NAD  CV: Regular rate, warm and well perfused  Resp: breathing comfortably on room air   Abdomen: soft, appropriately tender, nondistended, fundus firm below the umbilicus  Incision: clean/dry/intact with dermabond in place   Extremities: non-tender, non-edematous     Recent Labs   Lab 02/22/19  0515 02/18/19  1551   HGB 11.0* 11.8       A: Ms. Galvez is a 23 year old now P1, POD #1 s/p PLTCS for Cat 2 FHT remote from delivery. Pregnancy c/b cholestasis of pregnancy and Rh negative status. Recovery appropriately for POD#1.     P:  Continue routine pp cares  GI: tolerating regular diet  Feeding: breast  Contraception: not discussed  Disposition: routine PP cares, anticipate d/c tomorrow, once discharge goals are obtained, likely POD # 2, pt desires discharge ASAP given prolonged IOL.     Lesa Courtney MD  [facility]   2/22/2019 9:05 AM      "

## 2019-02-22 NOTE — PROGRESS NOTES
Baby out to room with mom and dad.  Reviewed pump/iv.  Enc to call if anything beeps.  Sat monitor is to be kept on baby overnight.  100% at present.

## 2019-02-23 LAB
ABO + RH BLD: NORMAL
ABO + RH BLD: NORMAL
BLOOD BANK CMNT PATIENT-IMP: NORMAL
DATE RH IMM GL GVN: NORMAL
FETAL CELL SCN BLD QL ROSETTE: NORMAL
RH IG VIALS RECOM PATIENT: NORMAL

## 2019-02-23 PROCEDURE — 25000132 ZZH RX MED GY IP 250 OP 250 PS 637: Performed by: OBSTETRICS & GYNECOLOGY

## 2019-02-23 PROCEDURE — 12000000 ZZH R&B MED SURG/OB

## 2019-02-23 PROCEDURE — 25000128 H RX IP 250 OP 636: Performed by: OBSTETRICS & GYNECOLOGY

## 2019-02-23 RX ORDER — IBUPROFEN 800 MG/1
800 TABLET, FILM COATED ORAL EVERY 6 HOURS PRN
Qty: 45 TABLET | Refills: 1 | Status: SHIPPED | OUTPATIENT
Start: 2019-02-23 | End: 2019-03-25

## 2019-02-23 RX ORDER — AMOXICILLIN 250 MG
1 CAPSULE ORAL 2 TIMES DAILY PRN
Qty: 60 TABLET | Refills: 1 | Status: SHIPPED | OUTPATIENT
Start: 2019-02-23 | End: 2019-03-25

## 2019-02-23 RX ORDER — OXYCODONE AND ACETAMINOPHEN 5; 325 MG/1; MG/1
1-2 TABLET ORAL EVERY 6 HOURS PRN
Qty: 25 TABLET | Refills: 0 | Status: SHIPPED | OUTPATIENT
Start: 2019-02-23 | End: 2019-02-26

## 2019-02-23 RX ADMIN — OXYCODONE HYDROCHLORIDE 10 MG: 5 TABLET ORAL at 00:55

## 2019-02-23 RX ADMIN — IBUPROFEN 800 MG: 800 TABLET ORAL at 15:31

## 2019-02-23 RX ADMIN — OXYCODONE HYDROCHLORIDE 5 MG: 5 TABLET ORAL at 03:54

## 2019-02-23 RX ADMIN — ACETAMINOPHEN 975 MG: 325 TABLET, FILM COATED ORAL at 09:08

## 2019-02-23 RX ADMIN — IBUPROFEN 800 MG: 800 TABLET ORAL at 22:10

## 2019-02-23 RX ADMIN — ACETAMINOPHEN 975 MG: 325 TABLET, FILM COATED ORAL at 00:55

## 2019-02-23 RX ADMIN — CETIRIZINE HYDROCHLORIDE 10 MG: 10 TABLET, FILM COATED ORAL at 08:14

## 2019-02-23 RX ADMIN — OXYCODONE HYDROCHLORIDE 5 MG: 5 TABLET ORAL at 20:32

## 2019-02-23 RX ADMIN — OXYCODONE HYDROCHLORIDE 10 MG: 5 TABLET ORAL at 12:31

## 2019-02-23 RX ADMIN — OXYCODONE HYDROCHLORIDE 10 MG: 5 TABLET ORAL at 08:14

## 2019-02-23 RX ADMIN — HUMAN RHO(D) IMMUNE GLOBULIN 300 MCG: 300 INJECTION, SOLUTION INTRAMUSCULAR at 14:49

## 2019-02-23 RX ADMIN — OXYCODONE HYDROCHLORIDE 10 MG: 5 TABLET ORAL at 16:51

## 2019-02-23 RX ADMIN — IBUPROFEN 800 MG: 800 TABLET ORAL at 01:16

## 2019-02-23 RX ADMIN — SENNOSIDES AND DOCUSATE SODIUM 1 TABLET: 8.6; 5 TABLET ORAL at 08:15

## 2019-02-23 RX ADMIN — ACETAMINOPHEN 975 MG: 325 TABLET, FILM COATED ORAL at 18:19

## 2019-02-23 RX ADMIN — IBUPROFEN 800 MG: 800 TABLET ORAL at 08:14

## 2019-02-23 NOTE — DISCHARGE SUMMARY
[facility]  Delivery Discharge Summary    Admit date: 2019  Discharge date: 2019     Admit Dx:   - 23 year old y/o  at 37w2d  - cholestasis of pregnancy  - Rh neg status   - family hx of cardiac defect with normal L2 and fetal ECHO   - Cat 2 FHT remote from delivery     Discharge Dx:  - Same as above, delivered    Procedures:  - Primary low transverse  section with double layer closure via Pfannenstiel incision  - epidural analgesia    Admit HPI:  Dalila Galvez is a 23 year old  @ 37+5 Weeks EGA  She presented for IOL on 2019  She underwent Cook Balloon and pitocin to max of 18 [employer] units/ min   Pregnancy was complicated by Cholestasis of pregnancy  GBS neg  Membrane status: AROM @ 23:37 on 2019  Labored with:   Pain meds Epidural   FHR Cat II  Delivered a viable male  @ 04:41 via LST CS   Weight 7#4 oz   APGAR's 7/9  Placenta delivered @ 04:42 , was complete with a 3vessel cord  See op report   ml    Please see her admit H&P for full details of her PMH, PSH, Meds, Allergies and exam on admit.    Her postoperative course was uncomplicated. On POD#3, she was meeting all of her postpartum goals and deemed stable for discharge. She was voiding without difficulty, tolerating a regular diet without nausea and vomiting, her pain was well controlled on oral pain medicines and her lochia was appropriate. Her hemoglobin after delivery was 11.0. Her Rh status was neg and Rhogam was given prior to discharge. At the time of discharge, she was breastfeeding her infant and desires a Mirena IUD for contraception. She did have an isolated elevated blood pressure, but was otherwise normatensive.     Physical exam on the day of discharge:  Vitals:    19 0131 19 0805 19 1530 19 0008   BP: 116/66 133/85 140/82 123/78   Pulse: 72 93 106 86   Resp: 18 16 16    Temp: 98.1  F (36.7  C) 98.3  F (36.8  C) 98.3  F (36.8   C) 97.9  F (36.6  C)   TempSrc: Oral Oral Oral Oral   SpO2: 95%      Weight:       Height:         General: sitting up, alert and cooperative  Abd: soft, non-distended, non-tender. Fundus firm, nontender, below umbilicus.   Incision clean/dry/intact with dermabond in place.  Extremities: calves nontender, 2+ edema of lower extremities bilaterally to the calf    Lab Results   Component Value Date    HGB 11.0 02/22/2019    HGB 11.8 02/18/2019     Blood type:   Lab Results   Component Value Date    RH Neg 02/22/2019       Discharge/Disposition:  Dalila Galvez was discharged to home in stable condition with the following instructions/medications:  1) Call for temperature > 100.4, foul smelling vaginal discharge, bleeding > 1 pad per hour x 2 hrs, pain not controlled by oral pain meds, severe constipation or severe nausea or vomiting.  2) She received contraceptive counseling - thinking Mirena IUD   3) She was instructed to follow-up with her primary OB in 6 weeks for a routine postpartum visit.  4) She was instructed to continue her PNV on discharge if she wished to breast feed her infant.  5) She was discharged home with the following medications:      Review of your medicines      START taking      Dose / Directions   ibuprofen 800 MG tablet  Commonly known as:  ADVIL/MOTRIN      Dose:  800 mg  Take 1 tablet (800 mg) by mouth every 6 hours as needed for other (cramping)  Quantity:  45 tablet  Refills:  1     oxyCODONE-acetaminophen 5-325 MG tablet  Commonly known as:  PERCOCET      Dose:  1-2 tablet  Take 1-2 tablets by mouth every 6 hours as needed for pain  Quantity:  25 tablet  Refills:  0     senna-docusate 8.6-50 MG tablet  Commonly known as:  SENOKOT-S/PERICOLACE      Dose:  1 tablet  Take 1 tablet by mouth 2 times daily as needed for constipation  Quantity:  60 tablet  Refills:  1        CONTINUE these medicines which have NOT CHANGED      Dose / Directions   cetirizine 10 MG tablet  Commonly known as:   zyrTEC      Dose:  10 mg  Take 10 mg by mouth daily  Refills:  0     diphenhydrAMINE 25 MG tablet  Commonly known as:  BENADRYL      Dose:  25 mg  Take 25 mg by mouth every 6 hours as needed for itching or allergies  Refills:  0     prenatal multivitamin w/iron 27-0.8 MG tablet      Dose:  1 tablet  Take 1 tablet by mouth daily  Refills:  0           Where to get your medicines      These medications were sent to [facility] [address] [place] [address]  [address] [place] [id number]    Phone:  [phone]     ibuprofen 800 MG tablet    senna-docusate 8.6-50 MG tablet     Some of these will need a paper prescription and others can be bought over the counter. Ask your nurse if you have questions.    Bring a paper prescription for each of these medications    oxyCODONE-acetaminophen 5-325 MG tablet         Lesa Courtney MD  [facility]

## 2019-02-23 NOTE — PROGRESS NOTES
[facility] OB/GYN Daily Postpartum Note    S: Ms. Galvez is feeling well this morning. She denies any complaints. Her pain is well-controlled on oral pain medications. She tolerating a regular diet without nausea or vomiting. Ambulating without difficult. Flatus started last night. Lochia is scant. Voiding spontaneously. Breastfeeding without questions or concerns.    O:   VS:   Patient Vitals for the past 24 hrs:   BP Temp Temp src Pulse Heart Rate Resp SpO2   02/23/19 0805 133/85 98.3  F (36.8  C) Oral 93 -- 16 --   02/23/19 0131 116/66 98.1  F (36.7  C) Oral 72 -- 18 95 %   02/22/19 1703 130/84 97.9  F (36.6  C) Oral -- 111 16 98 %     General: resting in bed, in NAD  CV: Regular rate, warm and well perfused  Resp: breathing comfortably on room air   Abdomen: soft, appropriately tender, nondistended, fundus firm below the umbilicus  Incision: clean/dry/intact with dermabond in place   Extremities: non-tender, non-edematous     Recent Labs   Lab 02/22/19  0515 02/18/19  1551   HGB 11.0* 11.8       A: Ms. Galvez is a 23 year old now P1, POD#2 s/p PLTCS for Cat 2 FHT remote from delivery. Pregnancy c/b cholestasis of pregnancy and Rh negative status. Recovery appropriately.    P:  Continue routine pp cares  GI: tolerating regular diet  Feeding: breast  Contraception: not discussed  Disposition: routine PP cares, anticipate d/c tomorrow, once discharge goals are obtained, likely POD#3 to aide with maternal bonding and breastfeeding support.     Lesa Courtney MD  [facility]   2/23/2019

## 2019-02-23 NOTE — PLAN OF CARE
Pt vss, afebrile.  Breastfeeding infant.  Taking Ibup, Oxy & Tylenol for discomfort. Indep with self & infant cares.  Pt stable.

## 2019-02-23 NOTE — PLAN OF CARE
VSS, postpartum assessment WDL.  Pt independent with self and infant cares.  Voiding appropriately.  Tolerating regular diet.  Ambulated in room - gait steady, denies dizziness.    Fundus firm, midline, U/U; small lochia flow - denies clots.   Incision clean and dry - free from s/s of infection.  Pt reports some numbness around incision.  Ice applied for comfort.  Pt reported incision pain overnight - tylenol, ibuprofen, oxy administered per order. Pt able to sleep between cares. Pt reports pain adequately managed with medication; able to sleep between cares.   Increased swelling noted in pt's feet and ankles - +2 reflexes, no clonus.  Feet elevated and swelling decreased slightly.  Pt breastfeeding infant, pumping as well.  Bonding well with infant.  Spouse home overnight.    Plan to discharge home 2/24/19

## 2019-02-23 NOTE — PROGRESS NOTES
Pt stable.  Independent with self and infant cares.  Voiding appropriately.  Tolerating regular diet.  Pain controlled with oxycodone, ibuprofen, tylenol.  Pt breastfeeding infant with shield and pumping as well.  Pt bonding appropriately with infant.  Encouraged to call with needs.  Will continue to monitor.

## 2019-02-24 VITALS
DIASTOLIC BLOOD PRESSURE: 82 MMHG | TEMPERATURE: 98.5 F | RESPIRATION RATE: 16 BRPM | SYSTOLIC BLOOD PRESSURE: 141 MMHG | HEIGHT: 67 IN | BODY MASS INDEX: 45.99 KG/M2 | HEART RATE: 104 BPM | OXYGEN SATURATION: 95 % | WEIGHT: 293 LBS

## 2019-02-24 PROCEDURE — 25000132 ZZH RX MED GY IP 250 OP 250 PS 637: Performed by: OBSTETRICS & GYNECOLOGY

## 2019-02-24 RX ADMIN — OXYCODONE HYDROCHLORIDE 5 MG: 5 TABLET ORAL at 00:12

## 2019-02-24 RX ADMIN — ACETAMINOPHEN 975 MG: 325 TABLET, FILM COATED ORAL at 02:10

## 2019-02-24 RX ADMIN — SENNOSIDES AND DOCUSATE SODIUM 1 TABLET: 8.6; 5 TABLET ORAL at 08:22

## 2019-02-24 RX ADMIN — ACETAMINOPHEN 650 MG: 325 TABLET, FILM COATED ORAL at 09:52

## 2019-02-24 RX ADMIN — OXYCODONE HYDROCHLORIDE 10 MG: 5 TABLET ORAL at 06:51

## 2019-02-24 RX ADMIN — CETIRIZINE HYDROCHLORIDE 10 MG: 10 TABLET, FILM COATED ORAL at 08:22

## 2019-02-24 RX ADMIN — IBUPROFEN 800 MG: 800 TABLET ORAL at 09:52

## 2019-02-24 NOTE — PROGRESS NOTES
Pt dc'd to home stable.  Verbalized understanding of discharge instructions.  Enc pt to call with concerns.

## 2019-02-24 NOTE — PLAN OF CARE
VSS; postpartum assessment WDL.  Pt voiding appropriately; tolerating regular diet.  Pt independent with self and infant cares.   Fundus firm, midline, U/1; light flow - denies clots  Incision healing well - no s/s of infection. Ice applied for comfort.  Pt reported pain in incision overnight; prn ibuprofen, tylenol, oxycodone administered. Pt able to sleep between cares - reports meds adequately managed pain.   Pt pumping and breast feeding infant.  Bonding appropriately with infant.  Spouse not present overnight  Plan to discharge 2/24/19

## 2019-04-03 ENCOUNTER — PRENATAL OFFICE VISIT (OUTPATIENT)
Dept: OBGYN | Facility: CLINIC | Age: 24
End: 2019-04-03
Payer: COMMERCIAL

## 2019-04-03 VITALS
TEMPERATURE: 98.8 F | WEIGHT: 250.5 LBS | SYSTOLIC BLOOD PRESSURE: 124 MMHG | BODY MASS INDEX: 39.31 KG/M2 | HEIGHT: 67 IN | HEART RATE: 90 BPM | RESPIRATION RATE: 16 BRPM | DIASTOLIC BLOOD PRESSURE: 72 MMHG

## 2019-04-03 DIAGNOSIS — Z87.59 HISTORY OF CHOLESTASIS DURING PREGNANCY: ICD-10-CM

## 2019-04-03 DIAGNOSIS — L72.9 CYST OF SUBCUTANEOUS TISSUE: ICD-10-CM

## 2019-04-03 DIAGNOSIS — O34.219 PREVIOUS CESAREAN DELIVERY, DELIVERED: ICD-10-CM

## 2019-04-03 DIAGNOSIS — Z87.19 HISTORY OF CHOLESTASIS DURING PREGNANCY: ICD-10-CM

## 2019-04-03 PROCEDURE — 99207 ZZC POST PARTUM EXAM: CPT | Performed by: OBSTETRICS & GYNECOLOGY

## 2019-04-03 ASSESSMENT — PATIENT HEALTH QUESTIONNAIRE - PHQ9
5. POOR APPETITE OR OVEREATING: NOT AT ALL
SUM OF ALL RESPONSES TO PHQ QUESTIONS 1-9: 0

## 2019-04-03 ASSESSMENT — ANXIETY QUESTIONNAIRES
3. WORRYING TOO MUCH ABOUT DIFFERENT THINGS: NOT AT ALL
5. BEING SO RESTLESS THAT IT IS HARD TO SIT STILL: NOT AT ALL
GAD7 TOTAL SCORE: 0
7. FEELING AFRAID AS IF SOMETHING AWFUL MIGHT HAPPEN: NOT AT ALL
1. FEELING NERVOUS, ANXIOUS, OR ON EDGE: NOT AT ALL
6. BECOMING EASILY ANNOYED OR IRRITABLE: NOT AT ALL
2. NOT BEING ABLE TO STOP OR CONTROL WORRYING: NOT AT ALL

## 2019-04-03 ASSESSMENT — MIFFLIN-ST. JEOR: SCORE: 1917.85

## 2019-04-03 NOTE — PROGRESS NOTES
"     HPI-Post Partum Visit -       Dalila Galvez is a 23 year old  female  with a significant past medical history of cholestasis of pregnancy who presents for a postpartum visit.     Dalila is doing well since delivery. She is breastfeeding, which is going well. Her milk production is adequate, though she notes her right breast expresses much more milk than her left breast. Elias (baby) is very hungry, so she is also supplementing his diet with formula. He is growing well.     She denies any current vaginal bleeding or discharge. These stopped about 2 weeks ago. She has not had intercourse since delivery and does not wish to start any forms of contraception today, though she has no plans of having more children in the near future. She did not push during delivery at all due to \"swollen cervix,\" so she has no lacerations or concerns for vaginal injury.    Patient's pregnancy was complicated by cholestasis. She has had no RUQ pain nor itching since delivery. She has not noticed any jaundice.     Dalila denies any mood changes or feelings of sadness or depression. She has no anxiety. She returned to work this week. Her sister and sister-in-law are helping with . She trusts them, so she feels comfortable leaving the baby with them to go to work. Her  has been supportive. He is currently on a fishing trip.    Obstetric History:  Obstetric History       T1      L1     SAB0   TAB0   Ectopic0   Multiple0   Live Births1       # Outcome Date GA Lbr Benigno/2nd Weight Sex Delivery Anes PTL Lv   1 Term 19 37w5d  3.289 kg (7 lb 4 oz) M CS-LTranv EPI N ANÍBAL      Name: Elias      Complications: Cephalopelvic Disproportion,Failure to Progress in First Stage,Cholestasis      Apgar1:  7                Apgar5: 9          Patient Active Problem List   Diagnosis     Prenatal care, first pregnancy     BMI 39.0-39.9,adult     Rh negative, antepartum     Cholestasis during pregnancy in third " "trimester     S/P  section       Current Outpatient Medications   Medication Sig Dispense Refill     cetirizine (ZYRTEC) 10 MG tablet Take 10 mg by mouth daily       diphenhydrAMINE (BENADRYL) 25 MG tablet Take 25 mg by mouth every 6 hours as needed for itching or allergies       Prenatal Vit-Fe Fumarate-FA (PRENATAL MULTIVITAMIN PLUS IRON) 27-0.8 MG TABS per tablet Take 1 tablet by mouth daily                        Delivery date: 19      Patient had a  for failure to progress of viable boy, weight 7 lbs 4 ozs,           with no complications.          Accompanying Signs & Symptoms:                        Breast feeding: breastfeeding going well, every 1-3 hrs, 8-12 times/24 hours, formula supplementation due to baby eating a lot, right breast contains about twice the milk of the left breast   Abdominal pain: no                       Bleeding since delivery: stopped about 2 weeks ago        Contraception choice: none        Patient has not had intercourse since delivery        Last PAP:   Lab Results   Component Value Date    PAP NIL 2018     Questions for Caregiver to screen for Post Partum Depression:    During the past month, have you often been bothered by feeling down, depressed, or hopeless? No  During the past month, have you often been bothered by having little interest or pleasure in doing things? No    Pospartum Depression screen:    Screen negative for Post Partum Depression.     No Known Allergies         Review of Systems:   GI: no abdominal pain  : no vaginal bleeding, no vaginal discharge  MSK: no joint aches  Skin: multiple moles, no new concerns  PSYCHIATRIC: no depressed mood, no anxiety            Physical Exam:     Vitals:    19 1424   BP: 124/72   BP Location: Left arm   Patient Position: Chair   Cuff Size: Adult Large   Pulse: 90   Resp: 16   Temp: 98.8  F (37.1  C)   TempSrc: Tympanic   Weight: 113.6 kg (250 lb 8 oz)   Height: 1.708 m (5' 7.25\") "     General: well-appearing, no acute distress  HEENT: normocephalic, atraumatic  Chest: breast exam deferred due to breastfeeding  Respiratory: breathing comfortably on room air  Abdominal: non-distended, soft  : vaginal and cervical exam deferred since patient did not have a vaginal delivery  Musculoskeletal: no obvious deformities  Skin: multiple dark-colored moles noted on abdomen, suprapubic incision appears clean, dry, and intact without surrounding erythema or drainage; ~1 cm diameter well-circumscribed fluid collection felt over the left superior region of incision without apparent communication with incision, non-tender, non-erythematous, not indurated or warm to touch  Neurologic: cranial nerves grossly intact, moves all extremities equally, no obvious focal deficits  Psychiatric: pleasant, appropriate mood and affect    Admission on 02/18/2019, Discharged on 02/24/2019   Component Date Value Ref Range Status     Treponema Antibodies 02/18/2019 Nonreactive  NR^Nonreactive Final     WBC 02/18/2019 8.8  4.0 - 11.0 10e9/L Final     RBC Count 02/18/2019 3.94  3.8 - 5.2 10e12/L Final     Hemoglobin 02/18/2019 11.8  11.7 - 15.7 g/dL Final     Hematocrit 02/18/2019 36.3  35.0 - 47.0 % Final     MCV 02/18/2019 92  78 - 100 fl Final     MCH 02/18/2019 29.9  26.5 - 33.0 pg Final     MCHC 02/18/2019 32.5  31.5 - 36.5 g/dL Final     RDW 02/18/2019 14.4  10.0 - 15.0 % Final     Platelet Count 02/18/2019 278  150 - 450 10e9/L Final     Diff Method 02/18/2019 Automated Method   Final     % Neutrophils 02/18/2019 70.4  % Final     % Lymphocytes 02/18/2019 21.2  % Final     % Monocytes 02/18/2019 7.3  % Final     % Eosinophils 02/18/2019 0.3  % Final     % Basophils 02/18/2019 0.2  % Final     % Immature Granulocytes 02/18/2019 0.6  % Final     Nucleated RBCs 02/18/2019 0  0 /100 Final     Absolute Neutrophil 02/18/2019 6.2  1.6 - 8.3 10e9/L Final     Absolute Lymphocytes 02/18/2019 1.9  0.8 - 5.3 10e9/L Final      Absolute Monocytes 02/18/2019 0.6  0.0 - 1.3 10e9/L Final     Absolute Eosinophils 02/18/2019 0.0  0.0 - 0.7 10e9/L Final     Absolute Basophils 02/18/2019 0.0  0.0 - 0.2 10e9/L Final     Abs Immature Granulocytes 02/18/2019 0.1  0 - 0.4 10e9/L Final     Absolute Nucleated RBC 02/18/2019 0.0   Final     ABO 02/18/2019 O   Final     RH(D) 02/18/2019 Neg   Final     Antibody Screen 02/18/2019 Pos*  Final     Test Valid Only At 02/18/2019 [facility]      Final     Specimen Expires 02/18/2019 02/21/2019   Final     Blood Bank Comment 02/18/2019    Final                    Value:Delay in availability of Red Cells called to PCU by  JOSEMANUEL CLIFTON RN 2/18/19 1700 MS  Sent to Reference Lab       Sodium 02/18/2019 139  133 - 144 mmol/L Final     Potassium 02/18/2019 3.7  3.4 - 5.3 mmol/L Final     Chloride 02/18/2019 107  94 - 109 mmol/L Final     Carbon Dioxide 02/18/2019 21  20 - 32 mmol/L Final     Anion Gap 02/18/2019 11  3 - 14 mmol/L Final     Glucose 02/18/2019 92  70 - 99 mg/dL Final     Urea Nitrogen 02/18/2019 6* 7 - 30 mg/dL Final     Creatinine 02/18/2019 0.54  0.52 - 1.04 mg/dL Final     GFR Estimate 02/18/2019 >90  >60 mL/min/[1.73_m2] Final    Comment: Non  GFR Calc  Starting 12/18/2018, serum creatinine based estimated GFR (eGFR) will be   calculated using the Chronic Kidney Disease Epidemiology Collaboration   (CKD-EPI) equation.       GFR Estimate If Black 02/18/2019 >90  >60 mL/min/[1.73_m2] Final    Comment:  GFR Calc  Starting 12/18/2018, serum creatinine based estimated GFR (eGFR) will be   calculated using the Chronic Kidney Disease Epidemiology Collaboration   (CKD-EPI) equation.       Calcium 02/18/2019 9.2  8.5 - 10.1 mg/dL Final     Bilirubin Total 02/18/2019 0.3  0.2 - 1.3 mg/dL Final     Albumin 02/18/2019 2.6* 3.4 - 5.0 g/dL Final     Protein Total 02/18/2019 6.4* 6.8 - 8.8 g/dL Final     Alkaline Phosphatase 02/18/2019 148  40 - 150 U/L Final     ALT  2019 32  0 - 50 U/L Final     AST 2019 22  0 - 45 U/L Final     Antibody Identification 2019    Final                    Value:ANTI-D   Probably due to prenatal Rhogam       ABO 2019 O   Final     RH(D) 2019 Neg   Final     Blood Bank Comment 2019 Testing performed at [facility].   Final     Rhogam Order 2019 Order received   Final    See Rhogam Study/Suitability     ABO 2019 O   Final     RH(D) 2019 Neg   Final     Fetal Blood Screen 2019 Neg   Final     Blood Bank Comment 2019    Final                    Value:Antibody screening not done due to Prenatal Rhogam  Suitable for Rh Immunoglobulin       RhIg Administered 2019 [id number]   Final     Amount of RHIG Required 2019 300 micrograms Rh Immune Globulin required   Final     Hemoglobin 2019 11.0* 11.7 - 15.7 g/dL Final         Assessment and Plan    (Z39.2) Routine postpartum follow-up  (primary encounter diagnosis)  (O34.219) Previous  delivery, delivered    Plan: resume normal activities    (L72.9) Cyst of subcutaneous tissue  Comment: superior left incision  Plan: watch for resolution    ((Z87.59,  Z87.19) History of cholestasis during pregnancy  Comment: recurrence risk  Plan: watch in subsequent pregnancies      -Tdap for pertussis prophylaxis: received 19  -Pap not indicated  -Contraception: discussed mini (progesterone-only) pill, patient prefers to wait until a later date to start this medication  -Reviewed risks for post partum depression.    Cholestasis of pregnancy  Patient reports resolution of symptoms since delivery. Since symptoms developed late in pregnancy, she was not treated for cholestasis at the time.  - Consider rechecking bile acids to ensure they have returned to normal since delivery  - Okay to take progestin-only contraceptive pills now while breastfeeding   - Likely okay to resume combination OCPs after  breastfeeding if she desires, low risk of recurrent cholestasis outside of pregnancy, but will need LFT monitoring 3-6 months after starting pills      Options for treatment and follow-up care were reviewed with the patient and/or guardian. Dalila Galvez and/or guardian engaged in the decision making process and verbalized understanding of the options discussed and agreed with the final plan.    Soniya Driver, MS3  [employer]    Patient seen and examined with student  Francis Somers MD

## 2019-04-03 NOTE — NURSING NOTE
"Initial /72 (BP Location: Left arm, Patient Position: Chair, Cuff Size: Adult Large)   Pulse 90   Temp 98.8  F (37.1  C) (Tympanic)   Resp 16   Ht 1.708 m (5' 7.25\")   Wt 113.6 kg (250 lb 8 oz)   LMP 06/02/2018   Breastfeeding? Yes   BMI 38.94 kg/m   Estimated body mass index is 38.94 kg/m  as calculated from the following:    Height as of this encounter: 1.708 m (5' 7.25\").    Weight as of this encounter: 113.6 kg (250 lb 8 oz). .    Sommer Saini, DERRICK    "

## 2019-04-04 ASSESSMENT — ANXIETY QUESTIONNAIRES: GAD7 TOTAL SCORE: 0

## 2019-11-03 ENCOUNTER — HEALTH MAINTENANCE LETTER (OUTPATIENT)
Age: 24
End: 2019-11-03

## 2020-01-09 ENCOUNTER — VIRTUAL VISIT (OUTPATIENT)
Dept: FAMILY MEDICINE | Facility: OTHER | Age: 25
End: 2020-01-09

## 2020-01-09 NOTE — PROGRESS NOTES
"Date: 2020 07:54:16  Clinician: Denis Cox  Clinician NPI: [id number]  Patient: Dalila Galvez  Patient : 1995  Patient Address: [address] [place] [address]  Patient Phone: [phone]  Visit Protocol: Eye conditions  Patient Summary:  Dalila is a 24 year old (: 1995 ) female who initiated a Visit for conjunctivitis.  When asked the question \"Please sign me up to receive news, health information and promotions from [employer].\", Dalila responded \"No\".    Images of her eye condition were uploaded.   Her symptoms started 1-2 days ago and affect the right eye. The symptoms consist of eye redness, eyelid swelling, itchy eye(s), eye pain, and drainage coming from the eye(s).   Symptom details     Drainage: The color of the drainage coming out of her eye(s) is clear. The drainage is thick and causes her eyelids to be stuck shut in the morning.    Itchiness: Dalila does not have seasonal allergies or hay fever.    Eye pain: She is experiencing mild eye pain (1-3 on a 10 point pain scale). She has not taken over-the-counter medication for the pain.     Denied symptoms include bumps on the eyelid and light sensitivity. Dalila does not have subconjunctival hemorrhage and has not experienced a decrease in vision. She does not feel feverish.   Precipitating events  Dalila has recently been exposed to someone with a red eye or an eye infection.   Dalila has not had a recent diagnosis of conjunctivitis. She also has not had a recent foreign body in the eye(s), eye injury, cold or ear infection, and eye surgery. She does not wear contact lenses.   Pertinent medical history  Dalila has not ever been diagnosed with glaucoma.   Dalila is not taking medication to treat her current symptoms.   Dalila does not require proof of evaluation of her eye condition before returning to school, work, or .   Dalila does not smoke or use smokeless tobacco.   She denies pregnancy and denies breastfeeding. She has " menstruated in the past month.     MEDICATIONS: No current medications, ALLERGIES: NKDA  Clinician Response:  Dear Dalila,  Based on the information provided, you most likely have bacterial conjunctivitis, more commonly called pink eye.  Medication information  I am prescribing:  Gentamicin 0.3% ophthalmic (eye) drops. Apply 1-2 drops into the affected eye(s) every 4 hours for 7 days. There are no refills with this prescription.  The medication I prescribed is an antibiotic medication. Infections can be caused by either bacteria or a virus, and often have similar symptoms, so it is possible that this is a viral infection. Antibiotics are only effective against bacterial infections, so when it is caused by a virus, the medication will not help symptoms improve or make it less contagious.  Self care  To reduce the spread of the eye infection, you should not use eye makeup until the infection has fully resolved, and be sure to wash your hands at least once per hour and avoid touching the eyes as much as possible.  The following will reduce the risk for future eye infections:     Frequent handwashing    Replace towels and washcloths daily    Do not share towels and washcloths with others    Replace eye makeup used while eyes were infected    Do not use anyone else's eye makeup     Steps you can take to be as comfortable as possible:     Avoid rubbing your eyes    Apply a cool compress to the eye(s)    Take regular breaks and remember to blink regularly when reading or using a computer for long periods of time    Wear sunglasses when outside    Wear eye protection when swimming or working with chemicals    Use good lighting     When to seek care  Please make an appointment to be seen in a clinic or urgent care if any of the following occurs:     You develop new symptoms or your symptoms becomes worse.    Your symptoms do not improve within 2 days of starting treatment.      Diagnosis: Bacterial conjunctivitis  Diagnosis  ICD: H10.9  Prescription: gentamicin 0.3 % ophthalmic (eye) drops 1 5 ml dropper bottle, 7 days supply. Apply 1-2 drops into the affected eye(s) every 4 hours for 7 days. Refills: 0, Refill as needed: no, Allow substitutions: yes  Pharmacy: [id number] - [phone] - [address] [place] [address]

## 2020-03-17 ENCOUNTER — APPOINTMENT (OUTPATIENT)
Dept: OBGYN | Facility: CLINIC | Age: 25
End: 2020-03-17
Payer: COMMERCIAL

## 2020-03-18 ENCOUNTER — PRENATAL OFFICE VISIT (OUTPATIENT)
Dept: OBGYN | Facility: CLINIC | Age: 25
End: 2020-03-18

## 2020-03-18 DIAGNOSIS — Z34.80 PRENATAL CARE, SUBSEQUENT PREGNANCY: ICD-10-CM

## 2020-03-18 PROCEDURE — 99207 ZZC NO CHARGE NURSE ONLY: CPT | Performed by: OBSTETRICS & GYNECOLOGY

## 2020-04-03 ENCOUNTER — VIRTUAL VISIT (OUTPATIENT)
Dept: FAMILY MEDICINE | Facility: OTHER | Age: 25
End: 2020-04-03

## 2020-04-03 ENCOUNTER — VIRTUAL VISIT (OUTPATIENT)
Dept: FAMILY MEDICINE | Facility: CLINIC | Age: 25
End: 2020-04-03
Payer: COMMERCIAL

## 2020-04-03 DIAGNOSIS — H66.001 ACUTE SUPPURATIVE OTITIS MEDIA OF RIGHT EAR WITHOUT SPONTANEOUS RUPTURE OF TYMPANIC MEMBRANE, RECURRENCE NOT SPECIFIED: Primary | ICD-10-CM

## 2020-04-03 PROCEDURE — 99213 OFFICE O/P EST LOW 20 MIN: CPT | Mod: TEL | Performed by: NURSE PRACTITIONER

## 2020-04-03 RX ORDER — AMOXICILLIN 875 MG
875 TABLET ORAL 2 TIMES DAILY
Qty: 20 TABLET | Refills: 0 | Status: SHIPPED | OUTPATIENT
Start: 2020-04-03 | End: 2020-05-11

## 2020-04-03 NOTE — PROGRESS NOTES
"Subjective     Dalila Galvez is a 24 year old female who is being evaluated via a billable telephone visit.      The patient has been notified of following:     \"This telephone visit will be conducted via a call between you and your physician/provider. We have found that certain health care needs can be provided without the need for a physical exam.  This service lets us provide the care you need with a short phone conversation.  If a prescription is necessary we can send it directly to your pharmacy.  If lab work is needed we can place an order for that and you can then stop by our lab to have the test done at a later time.    If during the course of the call the physician/provider feels a telephone visit is not appropriate, you will not be charged for this service.\"     Patient has given verbal consent for Telephone visit?  Yes    Dalila Galvez complains of   Chief Complaint   Patient presents with     Otalgia       ALLERGIES  Patient has no known allergies.    ENT Symptoms             Symptoms: cc Present Absent Comment   Fever/Chills   x    Fatigue  x     Muscle Aches   x    Eye Irritation   x    Sneezing  x     Nasal Orion/Drg x   Nasal congestion    Sinus Pressure/Pain  x     Loss of smell  x     Dental pain   x    Sore Throat   x    Swollen Glands   x    Ear Pain/Fullness x   Right, itchy, pain  - started last night    Cough  x  Mild    Wheeze   x    Chest Pain   x    Shortness of breath   x    Rash   x    Other  x  HA with congestion      Symptom duration:  1 week   Symptom severity:  mild    Treatments tried:  Chiropractor, sudafed, zyrtec , heating pad   Contacts:  none      *Pt is pregnant-7 weeks   Primarily sinus and ear symptoms on the right side-\"super painful\" ear  Has an otoscope at home and  said TM was very red this morning    Patient Active Problem List   Diagnosis     Prenatal care, first pregnancy     BMI 39.0-39.9,adult     Rh negative, antepartum     Cholestasis during " pregnancy in third trimester     Previous  delivery, delivered     History of cholestasis during pregnancy     Cyst of subcutaneous tissue     Prenatal care, subsequent pregnancy     Past Surgical History:   Procedure Laterality Date      SECTION N/A 2019    Procedure:  SECTION;  Surgeon: Francis Somers MD;  Location: [facility] OR       Social History     Tobacco Use     Smoking status: Never Smoker     Smokeless tobacco: Never Used   Substance Use Topics     Alcohol use: Yes     Comment: rare-quit with pregnancy     Family History   Problem Relation Age of Onset     Heart Disease Maternal Grandfather      Coronary Artery Disease Paternal Grandfather         MI         Current Outpatient Medications   Medication Sig Dispense Refill     amoxicillin (AMOXIL) 875 MG tablet Take 1 tablet (875 mg) by mouth 2 times daily for 10 days 20 tablet 0     cetirizine (ZYRTEC) 10 MG tablet Take 10 mg by mouth daily       diphenhydrAMINE (BENADRYL) 25 MG tablet Take 25 mg by mouth every 6 hours as needed for itching or allergies       Prenatal Vit-Fe Fumarate-FA (PRENATAL MULTIVITAMIN PLUS IRON) 27-0.8 MG TABS per tablet Take 1 tablet by mouth daily       No Known Allergies    Reviewed and updated as needed this visit by Provider  Tobacco  Allergies  Meds  Problems  Med Hx  Surg Hx  Fam Hx         Review of Systems   ROS COMP: Constitutional, HEENT, cardiovascular, pulmonary, gi and gu systems are negative, except as otherwise noted.       Objective   Reported vitals:  LMP 2020    Psych: Alert and oriented times 3; coherent speech, normal   rate and volume, able to articulate logical thoughts, able   to abstract reason, no tangential thoughts, no hallucinations   or delusions  Her affect is normal and bright.    Diagnostic Test Results:  none         Assessment/Plan:  1. Acute suppurative otitis media of right ear without spontaneous rupture of tympanic membrane, recurrence not  specified  Symptoms consistent with an ear infection. Amoxicillin sent to the pharmacy for symptoms.  OTC medication safety discussed with pregnancy.  Try Afrin or saline nasal spray for sinus symptoms, Tylenol as needed for pain.  Symptomatic care and follow up discussed.  - amoxicillin (AMOXIL) 875 MG tablet; Take 1 tablet (875 mg) by mouth 2 times daily for 10 days  Dispense: 20 tablet; Refill: 0    Home care instructions were reviewed with the patient. The risks, benefits and treatment options of prescribed medications or other treatments have been discussed with the patient. The patient verbalized their understanding and should call or follow up if no improvement or if they develop further problems.    Return in about 3 days (around 4/6/2020), or if symptoms worsen or fail to improve.    Phone call duration:  7 minutes    AP Foley CNP

## 2020-04-03 NOTE — PROGRESS NOTES
"Date: 2020 03:50:43  Clinician: Maida Rangel  Clinician NPI: [id number]  Patient: Dalila Galvez  Patient : 1995  Patient Address: [address] [place] [address]  Patient Phone: [phone]  Visit Protocol: Ear pain  Patient Summary:  Dalila is a 24 year old ( : 1995 ) female who initiated a Visit for swimmer's ear (ear pain). When asked the question \"Please sign me up to receive news, health information and promotions from [employer].\", Dalila responded \"No\".    Dalila reports that her ear pain started yesterday. The ear pain is located inside the right ear.   In addition to the ear pain, Dalila is experiencing tenderness, itchiness, and a feeling of fullness in the ear(s). Her ear(s) is tender to the touch on the ear canal. Dalila reports having fluid draining from the ear(s). She feels feverish.   Symptom Details     Pain: Dalila is experiencing severe pain (7-9 on a 10 point pain scale). It does not get worse when she gently pulls on the earlobe(s) and eats or chews.     Drainage: The color of the fluid coming out of her ear(s) is clear. The fluid does not have a bad odor.     Temperature: Her current temperature is 98.4 degrees Fahrenheit.      Dalila denies redness in the ear(s). She also denies recent injuries near the ear(s), ever having ear tubes, and the possibility of a foreign object in the ear(s).   Precipitating events   Dalila denies swimming and flying within the past week.   Pertinent medical history   Weight: 260 lbs   She is pregnant and denies breastfeeding.   She does not smoke or use smokeless tobacco.   Additional health information pertinent to this Visit as reported by the patient (free text): Severe sinus pressure for the last 5 days. Stuffy nose can only blow out minimal green mucus.   Weight: 260 lbs    MEDICATIONS: Prenatal oral, ALLERGIES: NKDA  Clinician Response:  Dear Dalila,  Based on the information you provided, you have otalgia, otherwise known as ear pain.   " Mild ear pain or pressure is common when you have an upper respiratory infection. The pain is caused by fluid and inflammation in your sinus passages.  Medication information  Unless you are allergic to the over-the-counter medication(s) below, I recommend using:     Acetaminophen (Tylenol or store brand) oral tablet. Please follow the instructions on the package.   Over-the-counter medications do not require a prescription. Ask the pharmacist if you have any questions.  When to seek care  Please make an appointment to be seen in a clinic or urgent care if any of the following occur:     Your ear pain persists more than 3 days    New symptoms develop or symptoms becomes worse      Diagnosis: Otalgia (ear pain)  Diagnosis ICD: H92.09

## 2020-04-13 ENCOUNTER — PRENATAL OFFICE VISIT (OUTPATIENT)
Dept: OBGYN | Facility: CLINIC | Age: 25
End: 2020-04-13
Payer: COMMERCIAL

## 2020-04-13 VITALS
HEART RATE: 93 BPM | HEIGHT: 67 IN | WEIGHT: 266 LBS | TEMPERATURE: 98.7 F | RESPIRATION RATE: 18 BRPM | SYSTOLIC BLOOD PRESSURE: 137 MMHG | BODY MASS INDEX: 41.75 KG/M2 | DIASTOLIC BLOOD PRESSURE: 76 MMHG

## 2020-04-13 DIAGNOSIS — Z34.81 PRENATAL CARE, SUBSEQUENT PREGNANCY IN FIRST TRIMESTER: Primary | ICD-10-CM

## 2020-04-13 PROBLEM — O26.643 CHOLESTASIS DURING PREGNANCY IN THIRD TRIMESTER: Status: RESOLVED | Noted: 2019-02-18 | Resolved: 2020-04-13

## 2020-04-13 PROBLEM — O34.219 PREVIOUS CESAREAN DELIVERY, DELIVERED: Status: RESOLVED | Noted: 2019-02-21 | Resolved: 2020-04-13

## 2020-04-13 PROBLEM — Z34.00 PRENATAL CARE, FIRST PREGNANCY: Status: RESOLVED | Noted: 2018-07-03 | Resolved: 2020-04-13

## 2020-04-13 LAB
ABO + RH BLD: NORMAL
ABO + RH BLD: NORMAL
ALBUMIN UR-MCNC: NEGATIVE MG/DL
AMPHETAMINES UR QL: NOT DETECTED NG/ML
APPEARANCE UR: CLEAR
BARBITURATES UR QL SCN: NOT DETECTED NG/ML
BENZODIAZ UR QL SCN: NOT DETECTED NG/ML
BILIRUB UR QL STRIP: NEGATIVE
BLD GP AB SCN SERPL QL: NORMAL
BLOOD BANK CMNT PATIENT-IMP: NORMAL
BUPRENORPHINE UR QL: NOT DETECTED NG/ML
CANNABINOIDS UR QL: NOT DETECTED NG/ML
COCAINE UR QL SCN: NOT DETECTED NG/ML
COLOR UR AUTO: YELLOW
D-METHAMPHET UR QL: NOT DETECTED NG/ML
ERYTHROCYTE [DISTWIDTH] IN BLOOD BY AUTOMATED COUNT: 12.8 % (ref 10–15)
GLUCOSE UR STRIP-MCNC: NEGATIVE MG/DL
HCT VFR BLD AUTO: 40.9 % (ref 35–47)
HGB BLD-MCNC: 13 G/DL (ref 11.7–15.7)
HGB UR QL STRIP: NEGATIVE
KETONES UR STRIP-MCNC: NEGATIVE MG/DL
LEUKOCYTE ESTERASE UR QL STRIP: NEGATIVE
MCH RBC QN AUTO: 28.4 PG (ref 26.5–33)
MCHC RBC AUTO-ENTMCNC: 31.8 G/DL (ref 31.5–36.5)
MCV RBC AUTO: 89 FL (ref 78–100)
METHADONE UR QL SCN: NOT DETECTED NG/ML
NITRATE UR QL: NEGATIVE
OPIATES UR QL SCN: NOT DETECTED NG/ML
OXYCODONE UR QL SCN: NOT DETECTED NG/ML
PCP UR QL SCN: NOT DETECTED NG/ML
PH UR STRIP: 6 PH (ref 5–7)
PLATELET # BLD AUTO: 328 10E9/L (ref 150–450)
PROPOXYPH UR QL: NOT DETECTED NG/ML
RBC # BLD AUTO: 4.58 10E12/L (ref 3.8–5.2)
SOURCE: NORMAL
SP GR UR STRIP: 1.01 (ref 1–1.03)
SPECIMEN EXP DATE BLD: NORMAL
TRICYCLICS UR QL SCN: NOT DETECTED NG/ML
UROBILINOGEN UR STRIP-ACNC: 0.2 EU/DL (ref 0.2–1)
WBC # BLD AUTO: 13.9 10E9/L (ref 4–11)

## 2020-04-13 PROCEDURE — 87591 N.GONORRHOEAE DNA AMP PROB: CPT | Performed by: OBSTETRICS & GYNECOLOGY

## 2020-04-13 PROCEDURE — 87086 URINE CULTURE/COLONY COUNT: CPT | Performed by: OBSTETRICS & GYNECOLOGY

## 2020-04-13 PROCEDURE — 76817 TRANSVAGINAL US OBSTETRIC: CPT | Performed by: OBSTETRICS & GYNECOLOGY

## 2020-04-13 PROCEDURE — 85027 COMPLETE CBC AUTOMATED: CPT | Performed by: OBSTETRICS & GYNECOLOGY

## 2020-04-13 PROCEDURE — 86901 BLOOD TYPING SEROLOGIC RH(D): CPT | Performed by: OBSTETRICS & GYNECOLOGY

## 2020-04-13 PROCEDURE — 87340 HEPATITIS B SURFACE AG IA: CPT | Performed by: OBSTETRICS & GYNECOLOGY

## 2020-04-13 PROCEDURE — 86900 BLOOD TYPING SEROLOGIC ABO: CPT | Performed by: OBSTETRICS & GYNECOLOGY

## 2020-04-13 PROCEDURE — 80306 DRUG TEST PRSMV INSTRMNT: CPT | Performed by: OBSTETRICS & GYNECOLOGY

## 2020-04-13 PROCEDURE — 86850 RBC ANTIBODY SCREEN: CPT | Performed by: OBSTETRICS & GYNECOLOGY

## 2020-04-13 PROCEDURE — 86762 RUBELLA ANTIBODY: CPT | Performed by: OBSTETRICS & GYNECOLOGY

## 2020-04-13 PROCEDURE — 99207 ZZC FIRST OB VISIT: CPT | Performed by: OBSTETRICS & GYNECOLOGY

## 2020-04-13 PROCEDURE — 86780 TREPONEMA PALLIDUM: CPT | Performed by: OBSTETRICS & GYNECOLOGY

## 2020-04-13 PROCEDURE — 87389 HIV-1 AG W/HIV-1&-2 AB AG IA: CPT | Performed by: OBSTETRICS & GYNECOLOGY

## 2020-04-13 PROCEDURE — 81003 URINALYSIS AUTO W/O SCOPE: CPT | Mod: 59 | Performed by: OBSTETRICS & GYNECOLOGY

## 2020-04-13 PROCEDURE — 87491 CHLMYD TRACH DNA AMP PROBE: CPT | Performed by: OBSTETRICS & GYNECOLOGY

## 2020-04-13 PROCEDURE — 36415 COLL VENOUS BLD VENIPUNCTURE: CPT | Performed by: OBSTETRICS & GYNECOLOGY

## 2020-04-13 ASSESSMENT — MIFFLIN-ST. JEOR: SCORE: 1983.16

## 2020-04-13 NOTE — NURSING NOTE
"Initial /76 (BP Location: Right arm, Patient Position: Chair, Cuff Size: Adult Large)   Pulse 93   Temp 98.7  F (37.1  C) (Tympanic)   Resp 18   Ht 1.708 m (5' 7.25\")   Wt 120.7 kg (266 lb)   LMP 02/14/2020   BMI 41.35 kg/m   Estimated body mass index is 41.35 kg/m  as calculated from the following:    Height as of this encounter: 1.708 m (5' 7.25\").    Weight as of this encounter: 120.7 kg (266 lb). .      "

## 2020-04-13 NOTE — PROGRESS NOTES
Discussed physician coverage, tertiary support, diet, exercise, weight gain, schedule of visits, routine and indicated ultrasounds, childbirth education and antepartum testing for certain birth defects.  Encouraged patient to review contents of Prenatal Breastfeeding Education Toolkit. Offered opportunity to answer questions regarding the importance of skin to skin contact, early initiation of exclusive breastfeeding for the first six months and rooming in while in the hospital.    Syphilis is a sexually transmitted disease that can cause birth defects in the babies of untreated mothers. Every pregnant patient is tested for syphilis early in each pregnancy as part of the routine lab work. The [place] Department of [facility] has seen an increase in the rate of syphilis in [place]. The [facility] now recommends testing for syphilis 2 times during a pregnancy, the new prenatal visit, and 28 weeks or when admitted for delivery. Patient accepts lab testing for syphilis.    Group call support for deliveries was discussed, as well as tertiary support in event of high risk issues within [facility].    Discussed current state of COVID-19 in pregnancy, when to seek emergency care, and how to self care at home with milder symptoms.        Options for  testing for birth defects were discussed with the patient, generally including CVS testing, Quad screen serum testing, nuchal lucency/blood marker testing, genetic amniocentesis, Verifi testing  and/or level 2 ultrasound.    Current issues include: nausea, moderate    Past medical, surgical, social and family histories reviewed on OB questionaire and included on episode summary.  Pertinent review of systems items stated above. See OB questionaire for pertinent components of HPI.    Past Medical History:   Diagnosis Date     Chickenpox      See epic chart    Past Surgical History:   Procedure Laterality Date      SECTION N/A 2019     "Procedure:  SECTION;  Surgeon: Francis oSmers MD;  Location: [facility] OR     See epic chart    Patient Active Problem List    Diagnosis Date Noted     History of cholestasis during pregnancy 2019     Priority: High     Prenatal care, subsequent pregnancy 2020     Priority: Medium     Cyst of subcutaneous tissue 2019     Priority: Medium     H/O:  2019     Priority: Medium     Rh negative, antepartum 2018     Priority: Medium     BMI 39.0-39.9,adult 2018     Priority: Medium       OBJECTIVE: /76 (BP Location: Right arm, Patient Position: Chair, Cuff Size: Adult Large)   Pulse 93   Temp 98.7  F (37.1  C) (Tympanic)   Resp 18   Ht 1.708 m (5' 7.25\")   Wt 120.7 kg (266 lb)   LMP 2020   BMI 41.35 kg/m      GENERAL APPEARANCE: healthy, alert and no distress  ABDOMEN:  soft, nontender, no hepato-splenomegaly or hernias   PELVIC:  EGBUS normal, vagina well estrogenized and supported, no unusual discharge, cervix grossly normal, PAP nottaken, uterus normal in size and configuration, adnexa non-tender and not enlarged, rectal normal tone,  guaic not done     NECK: no adenopathy, no asymmetry, masses, or scars and thyroid normal to palpation     RESP: lungs clear to auscultation , respiratory effort WNL     CV: regular rates and rhythm, normal S1 S2, no S3 or S4 and no murmur, click or rub -     SKIN: no suspicious lesions or rashes     PSYCH: mentation appears normal. and affect normal/bright, oriented to person/place/time     LYMPHATICS: No axillary, cervical, inguinal, or supraclavicular nodes    Transvaginal ultrasound was performed. A live single intrauterine pregnancy was seen.  CRL=1.87 cm, c/w 8 weeks, 3 days.  EDC by sono =20    Fetal heart motion was visualized.                ASSESSMENT:    ICD-10-CM    1. Prenatal care, subsequent pregnancy in first trimester  Z34.81 CBC with platelets     ABO/Rh type and screen     Hepatitis B surface " antigen     Rubella Antibody IgG Quantitative     Urine Culture Aerobic Bacterial     *UA reflex to Microscopic     HIV Antigen Antibody Combo     US OB <14 Weeks w Transvaginal Single     Urine Drugs of Abuse Screen Panel 13     Treponema Abs w Reflex to RPR and Titer     Neisseria gonorrhoeae PCR     Chlamydia trachomatis PCR         PLAN: see orders and OB problem list annotations  Desires RCS  Aware of risk of recurrent cholestasis of pregnancy    Jenny Cedeño MD

## 2020-04-14 LAB
BACTERIA SPEC CULT: NORMAL
C TRACH DNA SPEC QL NAA+PROBE: NEGATIVE
HBV SURFACE AG SERPL QL IA: NONREACTIVE
HIV 1+2 AB+HIV1 P24 AG SERPL QL IA: NONREACTIVE
Lab: NORMAL
N GONORRHOEA DNA SPEC QL NAA+PROBE: NEGATIVE
RUBV IGG SERPL IA-ACNC: 109 IU/ML
SPECIMEN SOURCE: NORMAL
T PALLIDUM AB SER QL: NONREACTIVE

## 2020-05-11 ENCOUNTER — VIRTUAL VISIT (OUTPATIENT)
Dept: OBGYN | Facility: CLINIC | Age: 25
End: 2020-05-11
Payer: COMMERCIAL

## 2020-05-11 VITALS — HEIGHT: 67 IN | BODY MASS INDEX: 40.65 KG/M2 | WEIGHT: 259 LBS

## 2020-05-11 DIAGNOSIS — Z34.82 PRENATAL CARE, SUBSEQUENT PREGNANCY IN SECOND TRIMESTER: Primary | ICD-10-CM

## 2020-05-11 PROCEDURE — 99207 ZZC PRENATAL VISIT: CPT | Performed by: OBSTETRICS & GYNECOLOGY

## 2020-05-11 ASSESSMENT — MIFFLIN-ST. JEOR: SCORE: 1951.41

## 2020-05-11 NOTE — PROGRESS NOTES
"Dalila Galvez is a 24 year old female who is being evaluated via a billable telephone visit.      The patient has been notified of following:     \"This telephone visit will be conducted via a call between you and your physician/provider. We have found that certain health care needs can be provided without the need for a physical exam.  This service lets us provide the care you need with a short phone conversation.  If a prescription is necessary we can send it directly to your pharmacy.  If lab work is needed we can place an order for that and you can then stop by our lab to have the test done at a later time.    Telephone visits are billed at different rates depending on your insurance coverage. During this emergency period, for some insurers they may be billed the same as an in-person visit.  Please reach out to your insurance provider with any questions.    If during the course of the call the physician/provider feels a telephone visit is not appropriate, you will not be charged for this service.\"    Patient has given verbal consent for Telephone visit?  Yes    What phone number would you like to be contacted at? [phone]    How would you like to obtain your AVS? MyChart     Feeling good. Nausea resolved. Eating, drinking or going well.   Declines QUAD screen.   FOB with \"hole in heart\" so will order the L2, Fetal ECHO with MFM.   Plan for in-person visit in 4weeks, will place MFM consult at that time.   Has a home doppler.   Reviewed COVID restrictions and precautions.     Phone call duration: 13minutes  Lesa Courtney MD          "

## 2020-06-08 ENCOUNTER — PRENATAL OFFICE VISIT (OUTPATIENT)
Dept: OBGYN | Facility: CLINIC | Age: 25
End: 2020-06-08
Payer: COMMERCIAL

## 2020-06-08 VITALS
TEMPERATURE: 98.9 F | BODY MASS INDEX: 40.73 KG/M2 | HEART RATE: 101 BPM | WEIGHT: 262 LBS | DIASTOLIC BLOOD PRESSURE: 66 MMHG | SYSTOLIC BLOOD PRESSURE: 94 MMHG

## 2020-06-08 DIAGNOSIS — Z34.82 PRENATAL CARE, SUBSEQUENT PREGNANCY IN SECOND TRIMESTER: Primary | ICD-10-CM

## 2020-06-08 PROCEDURE — 99207 ZZC PRENATAL VISIT: CPT | Performed by: OBSTETRICS & GYNECOLOGY

## 2020-06-08 NOTE — PROGRESS NOTES
CC: Here for routine prenatal visit @ 16w3d   HPI:  Feeling well; orders for Level 2 sonogram and echocardiogram submitted; discussed importance of hydration, sun burn and bug bite prevention    PE: BP 94/66 (BP Location: Right arm, Patient Position: Chair, Cuff Size: Thigh)   Pulse 101   Temp 98.9  F (37.2  C) (Tympanic)   Wt 118.8 kg (262 lb)   LMP 02/14/2020   Breastfeeding No   BMI 40.73 kg/m     See OB flowsheet      A:  1. Prenatal care, subsequent pregnancy in second trimester    - US OB > 14 Weeks; Future  - [facility] REFERRAL-PREGNANCY      Routine prenatal care  RTC 4 weeks for virtual visit      Jenny Cedeño M.D.

## 2020-06-08 NOTE — NURSING NOTE
"Initial BP 94/66 (BP Location: Right arm, Patient Position: Chair, Cuff Size: Thigh)   Pulse 101   Temp 98.9  F (37.2  C) (Tympanic)   Wt 118.8 kg (262 lb)   LMP 02/14/2020   Breastfeeding No   BMI 40.73 kg/m   Estimated body mass index is 40.73 kg/m  as calculated from the following:    Height as of 5/11/20: 1.708 m (5' 7.25\").    Weight as of this encounter: 118.8 kg (262 lb). .    Sheree Thomas MA    "

## 2020-06-09 ENCOUNTER — AMBULATORY - HEALTHEAST (OUTPATIENT)
Dept: MATERNAL FETAL MEDICINE | Facility: HOSPITAL | Age: 25
End: 2020-06-09

## 2020-06-09 ENCOUNTER — TRANSCRIBE ORDERS (OUTPATIENT)
Dept: MATERNAL FETAL MEDICINE | Facility: CLINIC | Age: 25
End: 2020-06-09

## 2020-06-09 DIAGNOSIS — O26.90 PREGNANCY, ANTEPARTUM, COMPLICATIONS: ICD-10-CM

## 2020-06-09 DIAGNOSIS — O26.90 PREGNANCY RELATED CONDITION, ANTEPARTUM: Primary | ICD-10-CM

## 2020-06-22 ENCOUNTER — AMBULATORY - HEALTHEAST (OUTPATIENT)
Dept: MATERNAL FETAL MEDICINE | Facility: HOSPITAL | Age: 25
End: 2020-06-22

## 2020-06-29 ENCOUNTER — OFFICE VISIT - HEALTHEAST (OUTPATIENT)
Dept: MATERNAL FETAL MEDICINE | Facility: HOSPITAL | Age: 25
End: 2020-06-29

## 2020-06-29 ENCOUNTER — RECORDS - HEALTHEAST (OUTPATIENT)
Dept: ADMINISTRATIVE | Facility: OTHER | Age: 25
End: 2020-06-29

## 2020-06-29 DIAGNOSIS — O99.210 OBESITY IN PREGNANCY WITH ANTEPARTUM COMPLICATION: Primary | ICD-10-CM

## 2020-06-29 DIAGNOSIS — Z82.79 FAMILY HISTORY OF CONGENITAL HEART DEFECT: ICD-10-CM

## 2020-06-29 DIAGNOSIS — O99.210 OBESITY AFFECTING PREGNANCY, ANTEPARTUM: ICD-10-CM

## 2020-07-06 ENCOUNTER — VIRTUAL VISIT (OUTPATIENT)
Dept: OBGYN | Facility: CLINIC | Age: 25
End: 2020-07-06
Payer: COMMERCIAL

## 2020-07-06 DIAGNOSIS — Z34.82 PRENATAL CARE, SUBSEQUENT PREGNANCY IN SECOND TRIMESTER: Primary | ICD-10-CM

## 2020-07-06 PROCEDURE — 99207 ZZC PRENATAL VISIT: CPT | Performed by: OBSTETRICS & GYNECOLOGY

## 2020-07-06 NOTE — PROGRESS NOTES
"Dalila Galvez is a 25 year old female who is being evaluated via a billable telephone visit.      The patient has been notified of following:     \"This telephone visit will be conducted via a call between you and your physician/provider. We have found that certain health care needs can be provided without the need for a physical exam.  This service lets us provide the care you need with a short phone conversation.  If a prescription is necessary we can send it directly to your pharmacy.  If lab work is needed we can place an order for that and you can then stop by our lab to have the test done at a later time.    Telephone visits are billed at different rates depending on your insurance coverage. During this emergency period, for some insurers they may be billed the same as an in-person visit.  Please reach out to your insurance provider with any questions.    If during the course of the call the physician/provider feels a telephone visit is not appropriate, you will not be charged for this service.\"    Patient has given verbal consent for Telephone visit?  Yes    What phone number would you like to be contacted at? [phone]    How would you like to obtain your AVS? Cammiehart    Feeling fine. Having another boy. Had L2 that was normal, but missing views. Call cut out at that time and was unable to reconnect with the patient.     Phone call duration: 5 minutes    Lesa Courtney MD        "

## 2020-07-21 ENCOUNTER — HOSPITAL ENCOUNTER (OUTPATIENT)
Dept: CARDIOLOGY | Facility: CLINIC | Age: 25
Discharge: HOME | End: 2020-07-21
Attending: OBSTETRICS & GYNECOLOGY | Admitting: OBSTETRICS & GYNECOLOGY
Payer: COMMERCIAL

## 2020-07-21 DIAGNOSIS — O99.210 OBESITY IN PREGNANCY WITH ANTEPARTUM COMPLICATION: ICD-10-CM

## 2020-07-21 DIAGNOSIS — Z82.79 FAMILY HISTORY OF CONGENITAL HEART DEFECT: ICD-10-CM

## 2020-07-21 PROCEDURE — 76827 ECHO EXAM OF FETAL HEART: CPT

## 2020-07-21 NOTE — PROGRESS NOTES
"[facility] Fetal Consult Note    Patient:  Dalila Galvez MRN:  [id number]   YOB: 1995 Age:  25 year old   Date of Visit:  2020 PCP:  No Ref-Primary, Physician     Dear Dr. Esparza:    I had the pleasure of seeing Dalila Galvez at the [facility] on 2020 in fetal cardiology consultation for fetal echocardiogram results. She presented today accompanied by herself. As you know, she is a 25 year old  at 22w4d who presented for fetal echocardiogram today because of family history congenital heart disease ( with \"hole in his heart\").    I performed and interpreted the fetal echocardiogram today, which demonstrated likely normal fetal echocardiogram. Normal fetal intracardiac connections. Normal right and left ventricular size and function. Fetal heart rate is regular at 155 bpm. No hydrops.    I reviewed the echo findings today with Dalila Galvez. Although this was a technically difficult study, the patient is aware that no obvious cardiac abnormalities were identified. She is aware of the general limitations of fetal echocardiography. No additional fetal echocardiograms are recommended. No  cardiac follow-up is required.     Plan:    1. No follow up fetal echocardiogram.     Thank you for allowing me to participate in Dalila's care. Please do not hesitate to contact me with questions or concerns.    This visit was separate from the performance and interpretation of the ultrasound. The majority of the time (>50%) was spent in counseling and coordination of care. I spent approximately 10 minutes in face-to-face time reviewing the above considerations.    Kash Cook M.D.  Pediatric Cardiology  [address], 5th floor, [place] [address]  Phone [phone]  Fax [phone]    "

## 2020-08-10 ENCOUNTER — RECORDS - HEALTHEAST (OUTPATIENT)
Dept: ULTRASOUND IMAGING | Facility: HOSPITAL | Age: 25
End: 2020-08-10

## 2020-08-10 ENCOUNTER — RECORDS - HEALTHEAST (OUTPATIENT)
Dept: ADMINISTRATIVE | Facility: OTHER | Age: 25
End: 2020-08-10

## 2020-08-10 ENCOUNTER — OFFICE VISIT - HEALTHEAST (OUTPATIENT)
Dept: MATERNAL FETAL MEDICINE | Facility: HOSPITAL | Age: 25
End: 2020-08-10

## 2020-08-10 ENCOUNTER — PRENATAL OFFICE VISIT (OUTPATIENT)
Dept: OBGYN | Facility: CLINIC | Age: 25
End: 2020-08-10
Payer: COMMERCIAL

## 2020-08-10 VITALS
SYSTOLIC BLOOD PRESSURE: 112 MMHG | HEART RATE: 99 BPM | BODY MASS INDEX: 42.24 KG/M2 | TEMPERATURE: 98.1 F | HEIGHT: 67 IN | WEIGHT: 269.1 LBS | RESPIRATION RATE: 18 BRPM | DIASTOLIC BLOOD PRESSURE: 66 MMHG

## 2020-08-10 DIAGNOSIS — O99.210 OBESITY AFFECTING PREGNANCY, ANTEPARTUM: ICD-10-CM

## 2020-08-10 DIAGNOSIS — Z34.82 PRENATAL CARE, SUBSEQUENT PREGNANCY IN SECOND TRIMESTER: Primary | ICD-10-CM

## 2020-08-10 DIAGNOSIS — Z82.79 FAMILY HISTORY OF OTHER CONGENITAL MALFORMATIONS, DEFORMATIONS AND CHROMOSOMAL ABNORMALITIES: ICD-10-CM

## 2020-08-10 DIAGNOSIS — O99.210 OBESITY COMPLICATING PREGNANCY, UNSPECIFIED TRIMESTER: ICD-10-CM

## 2020-08-10 PROCEDURE — 99207 ZZC PRENATAL VISIT: CPT | Performed by: OBSTETRICS & GYNECOLOGY

## 2020-08-10 ASSESSMENT — MIFFLIN-ST. JEOR: SCORE: 1992.22

## 2020-08-10 NOTE — NURSING NOTE
"Initial /66 (BP Location: Left arm, Patient Position: Chair, Cuff Size: Adult Large)   Pulse 99   Temp 98.1  F (36.7  C) (Tympanic)   Resp 18   Ht 1.708 m (5' 7.25\")   Wt 122.1 kg (269 lb 1.6 oz)   LMP 02/14/2020   Breastfeeding No   BMI 41.83 kg/m   Estimated body mass index is 41.83 kg/m  as calculated from the following:    Height as of this encounter: 1.708 m (5' 7.25\").    Weight as of this encounter: 122.1 kg (269 lb 1.6 oz). .    Sommer Saini CMA    "

## 2020-08-10 NOTE — PROGRESS NOTES
"CC: Here for routine prenatal visit @ 25w3d   HPI:  Will do glucose test next visit at time of RHOGAM; no s/s of cholestasis; feeling well    PE: /66 (BP Location: Left arm, Patient Position: Chair, Cuff Size: Adult Large)   Pulse 99   Temp 98.1  F (36.7  C) (Tympanic)   Resp 18   Ht 1.708 m (5' 7.25\")   Wt 122.1 kg (269 lb 1.6 oz)   LMP 02/14/2020   Breastfeeding No   BMI 41.83 kg/m     See OB flowsheet      A:  1. Prenatal care, subsequent pregnancy in second trimester    - Glucose tolerance gest screen 1 hour; Future  - CBC with platelets; Future  - Treponema Abs w Reflex to RPR and Titer; Future      Routine prenatal care  RTC 3 weeks.      Jenny Cedeño M.D.     "

## 2020-09-02 ENCOUNTER — PRENATAL OFFICE VISIT (OUTPATIENT)
Dept: OBGYN | Facility: CLINIC | Age: 25
End: 2020-09-02
Payer: COMMERCIAL

## 2020-09-02 VITALS
DIASTOLIC BLOOD PRESSURE: 84 MMHG | HEIGHT: 67 IN | SYSTOLIC BLOOD PRESSURE: 125 MMHG | BODY MASS INDEX: 42.69 KG/M2 | TEMPERATURE: 99 F | RESPIRATION RATE: 18 BRPM | HEART RATE: 101 BPM | WEIGHT: 272 LBS

## 2020-09-02 DIAGNOSIS — O26.892 RH NEGATIVE STATUS DURING PREGNANCY IN SECOND TRIMESTER, ANTEPARTUM: ICD-10-CM

## 2020-09-02 DIAGNOSIS — Z34.82 PRENATAL CARE, SUBSEQUENT PREGNANCY IN SECOND TRIMESTER: ICD-10-CM

## 2020-09-02 DIAGNOSIS — Z34.83 ENCOUNTER FOR SUPERVISION OF OTHER NORMAL PREGNANCY IN THIRD TRIMESTER: Primary | ICD-10-CM

## 2020-09-02 DIAGNOSIS — Z67.91 RH NEGATIVE STATUS DURING PREGNANCY IN SECOND TRIMESTER, ANTEPARTUM: ICD-10-CM

## 2020-09-02 LAB
ERYTHROCYTE [DISTWIDTH] IN BLOOD BY AUTOMATED COUNT: 13.5 % (ref 10–15)
GLUCOSE 1H P 50 G GLC PO SERPL-MCNC: 88 MG/DL (ref 60–129)
HCT VFR BLD AUTO: 38.7 % (ref 35–47)
HGB BLD-MCNC: 12.7 G/DL (ref 11.7–15.7)
MCH RBC QN AUTO: 30.2 PG (ref 26.5–33)
MCHC RBC AUTO-ENTMCNC: 32.8 G/DL (ref 31.5–36.5)
MCV RBC AUTO: 92 FL (ref 78–100)
PLATELET # BLD AUTO: 260 10E9/L (ref 150–450)
RBC # BLD AUTO: 4.2 10E12/L (ref 3.8–5.2)
WBC # BLD AUTO: 9.8 10E9/L (ref 4–11)

## 2020-09-02 PROCEDURE — 86780 TREPONEMA PALLIDUM: CPT | Performed by: OBSTETRICS & GYNECOLOGY

## 2020-09-02 PROCEDURE — 82950 GLUCOSE TEST: CPT | Performed by: OBSTETRICS & GYNECOLOGY

## 2020-09-02 PROCEDURE — 96372 THER/PROPH/DIAG INJ SC/IM: CPT | Performed by: OBSTETRICS & GYNECOLOGY

## 2020-09-02 PROCEDURE — 36415 COLL VENOUS BLD VENIPUNCTURE: CPT | Performed by: OBSTETRICS & GYNECOLOGY

## 2020-09-02 PROCEDURE — 99207 ZZC PRENATAL VISIT: CPT | Performed by: OBSTETRICS & GYNECOLOGY

## 2020-09-02 PROCEDURE — 85027 COMPLETE CBC AUTOMATED: CPT | Performed by: OBSTETRICS & GYNECOLOGY

## 2020-09-02 RX ORDER — BACLOFEN 20 MG
TABLET ORAL
Status: ON HOLD | COMMUNITY
End: 2020-11-13

## 2020-09-02 ASSESSMENT — MIFFLIN-ST. JEOR: SCORE: 2001.41

## 2020-09-02 NOTE — PROGRESS NOTES
Concerns: Considering sterilization she has private insurance  No evidence of cholestasis at this point  Doing well.  No concerns today.  No vaginal bleeding, loss of fluid, or contractions  Rhogam given today.  Discussed kick counts and fetal movement.  Discussed PTL, PROM, and when to call or come in.  Glucose challenge test and labs today  We will plan the Tdap at the next visit.  RTC in 2 weeks.  Face-to-face      Francis Somers MD

## 2020-09-02 NOTE — NURSING NOTE
"Initial /84 (BP Location: Right arm, Patient Position: Chair, Cuff Size: Adult Large)   Pulse 101   Temp 99  F (37.2  C) (Tympanic)   Resp 18   Ht 1.702 m (5' 7\")   Wt 123.4 kg (272 lb)   LMP 02/14/2020   Breastfeeding No   BMI 42.60 kg/m   Estimated body mass index is 42.6 kg/m  as calculated from the following:    Height as of this encounter: 1.702 m (5' 7\").    Weight as of this encounter: 123.4 kg (272 lb). .      "

## 2020-09-02 NOTE — NURSING NOTE
Clinic Administered Medication Documentation      Injectable Medication Documentation    Patient was given Rhogam. Prior to medication administration, verified patients identity using patient s name and date of birth. Please see MAR and medication order for additional information. Patient instructed to remain in clinic for 15 minutes.      Was entire vial of medication used? Yes  Vial/Syringe: Single dose vial  Expiration Date:  2/7/2022  Was this medication supplied by the patient? No   Given-right ventrogluteal  Annabelle Waters, CMA

## 2020-09-03 LAB — T PALLIDUM AB SER QL: NONREACTIVE

## 2020-09-17 ENCOUNTER — PRENATAL OFFICE VISIT (OUTPATIENT)
Dept: OBGYN | Facility: CLINIC | Age: 25
End: 2020-09-17
Payer: COMMERCIAL

## 2020-09-17 VITALS
DIASTOLIC BLOOD PRESSURE: 76 MMHG | TEMPERATURE: 98.4 F | HEART RATE: 100 BPM | WEIGHT: 276 LBS | SYSTOLIC BLOOD PRESSURE: 134 MMHG | BODY MASS INDEX: 43.23 KG/M2

## 2020-09-17 DIAGNOSIS — Z98.891 HISTORY OF C-SECTION: Primary | ICD-10-CM

## 2020-09-17 DIAGNOSIS — Z30.2 ENCOUNTER FOR STERILIZATION: ICD-10-CM

## 2020-09-17 DIAGNOSIS — Z34.83 PRENATAL CARE, SUBSEQUENT PREGNANCY IN THIRD TRIMESTER: ICD-10-CM

## 2020-09-17 PROCEDURE — 90715 TDAP VACCINE 7 YRS/> IM: CPT | Performed by: OBSTETRICS & GYNECOLOGY

## 2020-09-17 PROCEDURE — 99207 ZZC PRENATAL VISIT: CPT | Performed by: OBSTETRICS & GYNECOLOGY

## 2020-09-17 PROCEDURE — 90471 IMMUNIZATION ADMIN: CPT | Performed by: OBSTETRICS & GYNECOLOGY

## 2020-09-17 RX ORDER — CITRIC ACID/SODIUM CITRATE 334-500MG
30 SOLUTION, ORAL ORAL
Status: CANCELLED | OUTPATIENT
Start: 2020-09-17

## 2020-09-17 RX ORDER — ACETAMINOPHEN 325 MG/1
975 TABLET ORAL ONCE
Status: CANCELLED | OUTPATIENT
Start: 2020-09-17 | End: 2020-09-17

## 2020-09-17 RX ORDER — SODIUM CHLORIDE, SODIUM LACTATE, POTASSIUM CHLORIDE, CALCIUM CHLORIDE 600; 310; 30; 20 MG/100ML; MG/100ML; MG/100ML; MG/100ML
INJECTION, SOLUTION INTRAVENOUS CONTINUOUS
Status: CANCELLED | OUTPATIENT
Start: 2020-09-17

## 2020-09-17 RX ORDER — CEFAZOLIN SODIUM 1 G/50ML
1 INJECTION, SOLUTION INTRAVENOUS SEE ADMIN INSTRUCTIONS
Status: CANCELLED | OUTPATIENT
Start: 2020-09-17

## 2020-09-17 RX ORDER — CEFAZOLIN SODIUM IN 0.9 % NACL 3 G/100 ML
3 INTRAVENOUS SOLUTION, PIGGYBACK (ML) INTRAVENOUS
Status: CANCELLED | OUTPATIENT
Start: 2020-09-17

## 2020-09-17 NOTE — NURSING NOTE
"Initial /76 (BP Location: Right arm, Patient Position: Chair, Cuff Size: Adult Large)   Pulse 100   Temp 98.4  F (36.9  C) (Tympanic)   Wt 125.2 kg (276 lb)   LMP 02/14/2020   Breastfeeding No   BMI 43.23 kg/m   Estimated body mass index is 43.23 kg/m  as calculated from the following:    Height as of 9/2/20: 1.702 m (5' 7\").    Weight as of this encounter: 125.2 kg (276 lb). .    Sheree Thomas MA'  "

## 2020-09-17 NOTE — PROGRESS NOTES
CC: Here for routine prenatal visit @ 30w6d   HPI: + FM, no ctx, no LOF, no VB.  No complaints.  No itching    PE: /76 (BP Location: Right arm, Patient Position: Chair, Cuff Size: Adult Large)   Pulse 100   Temp 98.4  F (36.9  C) (Tympanic)   Wt 125.2 kg (276 lb)   LMP 2020   Breastfeeding No   BMI 43.23 kg/m     See OB flowsheet    A/P  @ 30w6d normal pregnancy    1. Routine prenatal care.  Had swelling and soreness with her last Tdap but is willing to have today.  Declines flu shot.  COVID restrictions and recommendations reviewed including iron supplementation.     2. Postpartum birth control/dysmenorrhea/menorrhagia: Patient is certain that she is done with childbearing and is planning on tubal ligation versus postpartum hysterectomy.  She has a longstanding history of heavy painful menses.  She has tried oral contraceptive pills, depo provera and Evra in the past for ~10 years.  She had difficulty remembering and was still crampy despite utilizing these methods.  She is strongly considering hysterectomy.  Reviewed pros/cons and timing of the procedure (at least 6 weeks postpartum).  Discussed continued check ins with us to be certain she is ready for such a procedure and to check with insurance.    3. S>D and hx macrosomia: U/S scheduled for next week    RTC 2 weeks.      Maida Fair M.D.

## 2020-09-23 ENCOUNTER — OFFICE VISIT - HEALTHEAST (OUTPATIENT)
Dept: MATERNAL FETAL MEDICINE | Facility: HOSPITAL | Age: 25
End: 2020-09-23

## 2020-09-23 ENCOUNTER — RECORDS - HEALTHEAST (OUTPATIENT)
Dept: ADMINISTRATIVE | Facility: OTHER | Age: 25
End: 2020-09-23

## 2020-09-23 ENCOUNTER — RECORDS - HEALTHEAST (OUTPATIENT)
Dept: ULTRASOUND IMAGING | Facility: HOSPITAL | Age: 25
End: 2020-09-23

## 2020-09-23 DIAGNOSIS — O99.210 OBESITY COMPLICATING PREGNANCY, UNSPECIFIED TRIMESTER: ICD-10-CM

## 2020-09-23 DIAGNOSIS — O99.210 OBESITY AFFECTING PREGNANCY, ANTEPARTUM: ICD-10-CM

## 2020-09-30 ENCOUNTER — TELEPHONE (OUTPATIENT)
Dept: OBGYN | Facility: CLINIC | Age: 25
End: 2020-09-30

## 2020-09-30 DIAGNOSIS — Z11.59 ENCOUNTER FOR SCREENING FOR OTHER VIRAL DISEASES: Primary | ICD-10-CM

## 2020-09-30 NOTE — TELEPHONE ENCOUNTER
"Dalila Galvez  [id number]    You are now scheduled for surgery at [facility].  Below are the details for your surgery.  Please read the \"Preparing for Your Surgery\" instructions and let us know if you have any questions.    Type of surgery:  SECTION, WITH POSTPARTUM TUBAL LIGATION (Bilateral)   Surgeon:  Francis Somers MD  Location of surgery: [facility] OR    Date of surgery: 20    Time: 9:30am   Arrival Time: 8:00am    Time can change, to be confirmed a couple of days prior by pre-op surgery nurse.    Pre-Op Appt Date: Last OB visit  Post-Op Appt Date: To be determined by provider   COVID test 3-4 days prior    Packet sent out: Yes  Pre-cert/Authorization completed:  TBD by Financial Securing Office.   MA Sterilization/Hysterectomy Acknowledgment Consent signed: Not Applicable    [facility]  [phone]    Fax: [phone]  Same Day Surgery [phone]  Fax: [phone]  [facility] [phone]      "

## 2020-10-01 ENCOUNTER — PRENATAL OFFICE VISIT (OUTPATIENT)
Dept: OBGYN | Facility: CLINIC | Age: 25
End: 2020-10-01
Payer: COMMERCIAL

## 2020-10-01 VITALS
HEIGHT: 67 IN | BODY MASS INDEX: 43.95 KG/M2 | SYSTOLIC BLOOD PRESSURE: 129 MMHG | HEART RATE: 112 BPM | RESPIRATION RATE: 18 BRPM | WEIGHT: 280 LBS | DIASTOLIC BLOOD PRESSURE: 86 MMHG | TEMPERATURE: 98.6 F

## 2020-10-01 DIAGNOSIS — Z87.59 HISTORY OF CHOLESTASIS DURING PREGNANCY: ICD-10-CM

## 2020-10-01 DIAGNOSIS — Z34.83 PRENATAL CARE, SUBSEQUENT PREGNANCY IN THIRD TRIMESTER: Primary | ICD-10-CM

## 2020-10-01 DIAGNOSIS — Z87.19 HISTORY OF CHOLESTASIS DURING PREGNANCY: ICD-10-CM

## 2020-10-01 PROCEDURE — 99207 PR PRENATAL VISIT: CPT | Performed by: OBSTETRICS & GYNECOLOGY

## 2020-10-01 ASSESSMENT — MIFFLIN-ST. JEOR: SCORE: 2037.7

## 2020-10-01 NOTE — PROGRESS NOTES
"CC: Here for routine prenatal visit @ 32w6d   HPI:  Denies itching as yet, but in last pregnancy started aroumd 34 weeks; MFM recommended weekly BPP    PE: /86 (BP Location: Right arm, Patient Position: Chair, Cuff Size: Adult Large)   Pulse 112   Temp 98.6  F (37  C) (Tympanic)   Resp 18   Ht 1.702 m (5' 7\")   Wt 127 kg (280 lb)   LMP 02/14/2020   BMI 43.85 kg/m     See OB flowsheet      A:  1. Prenatal care, subsequent pregnancy in third trimester      2. History of cholestasis during pregnancy    - US Fetal Biophys Prof w/o Non Stress Test; Standing      Routine prenatal care  Monitor for s/s of cholestasis of pregnancy  RTC 2 weeks.      Jenny Cedeño M.D.     "

## 2020-10-01 NOTE — NURSING NOTE
"Initial /86 (BP Location: Right arm, Patient Position: Chair, Cuff Size: Adult Large)   Pulse 112   Temp 98.6  F (37  C) (Tympanic)   Resp 18   Ht 1.702 m (5' 7\")   Wt 127 kg (280 lb)   LMP 02/14/2020   BMI 43.85 kg/m   Estimated body mass index is 43.85 kg/m  as calculated from the following:    Height as of this encounter: 1.702 m (5' 7\").    Weight as of this encounter: 127 kg (280 lb). .      "

## 2020-10-15 ENCOUNTER — PRENATAL OFFICE VISIT (OUTPATIENT)
Dept: OBGYN | Facility: CLINIC | Age: 25
End: 2020-10-15
Payer: COMMERCIAL

## 2020-10-15 ENCOUNTER — HOSPITAL ENCOUNTER (OUTPATIENT)
Dept: ULTRASOUND IMAGING | Facility: CLINIC | Age: 25
Discharge: HOME | End: 2020-10-15
Attending: OBSTETRICS & GYNECOLOGY | Admitting: OBSTETRICS & GYNECOLOGY
Payer: COMMERCIAL

## 2020-10-15 VITALS
RESPIRATION RATE: 18 BRPM | HEART RATE: 105 BPM | SYSTOLIC BLOOD PRESSURE: 131 MMHG | HEIGHT: 67 IN | WEIGHT: 283.6 LBS | BODY MASS INDEX: 44.51 KG/M2 | DIASTOLIC BLOOD PRESSURE: 76 MMHG

## 2020-10-15 DIAGNOSIS — Z34.83 PRENATAL CARE, SUBSEQUENT PREGNANCY IN THIRD TRIMESTER: Primary | ICD-10-CM

## 2020-10-15 DIAGNOSIS — Z87.59 HISTORY OF CHOLESTASIS DURING PREGNANCY: ICD-10-CM

## 2020-10-15 DIAGNOSIS — Z87.19 HISTORY OF CHOLESTASIS DURING PREGNANCY: ICD-10-CM

## 2020-10-15 PROCEDURE — 83789 MASS SPECTROMETRY QUAL/QUAN: CPT | Mod: 90 | Performed by: OBSTETRICS & GYNECOLOGY

## 2020-10-15 PROCEDURE — 99207 PR PRENATAL VISIT: CPT | Performed by: OBSTETRICS & GYNECOLOGY

## 2020-10-15 PROCEDURE — 99000 SPECIMEN HANDLING OFFICE-LAB: CPT | Performed by: OBSTETRICS & GYNECOLOGY

## 2020-10-15 PROCEDURE — 76819 FETAL BIOPHYS PROFIL W/O NST: CPT

## 2020-10-15 PROCEDURE — 36415 COLL VENOUS BLD VENIPUNCTURE: CPT | Performed by: OBSTETRICS & GYNECOLOGY

## 2020-10-15 ASSESSMENT — MIFFLIN-ST. JEOR: SCORE: 2054.03

## 2020-10-15 NOTE — NURSING NOTE
"Initial /76 (BP Location: Right arm, Patient Position: Chair, Cuff Size: Adult Large)   Pulse 105   Resp 18   Ht 1.702 m (5' 7\")   Wt 128.6 kg (283 lb 9.6 oz)   LMP 02/14/2020   Breastfeeding No   BMI 44.42 kg/m   Estimated body mass index is 44.42 kg/m  as calculated from the following:    Height as of this encounter: 1.702 m (5' 7\").    Weight as of this encounter: 128.6 kg (283 lb 9.6 oz). .    Sommer Saini, DERRICK    "

## 2020-10-15 NOTE — PROGRESS NOTES
"CC: Here for routine prenatal visit @ 34w6d   HPI:  Pt reports miild itching of feet has started; declines flu shot; discussed that if bile acids are elevated, then would need to move RCS to 37 wks gestation  Having weekly BPP    PE: /76 (BP Location: Right arm, Patient Position: Chair, Cuff Size: Adult Large)   Pulse 105   Resp 18   Ht 1.702 m (5' 7\")   Wt 128.6 kg (283 lb 9.6 oz)   LMP 02/14/2020   Breastfeeding No   BMI 44.42 kg/m     See OB flowsheet      A:  1. Prenatal care, subsequent pregnancy in third trimester    - Bile acids fractionated and total    2. History of cholestasis during pregnancy    - Bile acids fractionated and total      Routine prenatal care  RTC 1 weeks.      Jenny Cedeño M.D.     "

## 2020-10-18 LAB
BILE AC SERPL-SCNC: 0.7 UMOL/L (ref 0–2.5)
CDCAE SERPL-SCNC: 0.6 UMOL/L (ref 0–3.4)
CHOLATE SERPL-SCNC: 2 UMOL/L (ref 0–7)
DO-CHOLATE SERPL-SCNC: 0.4 UMOL/L (ref 0–1.9)
URSODEOXYCHOLATE SERPL-SCNC: 0.3 UMOL/L (ref 0–1)

## 2020-10-22 ENCOUNTER — PRENATAL OFFICE VISIT (OUTPATIENT)
Dept: OBGYN | Facility: CLINIC | Age: 25
End: 2020-10-22
Payer: COMMERCIAL

## 2020-10-22 ENCOUNTER — HOSPITAL ENCOUNTER (OUTPATIENT)
Dept: ULTRASOUND IMAGING | Facility: CLINIC | Age: 25
Discharge: HOME | End: 2020-10-22
Attending: OBSTETRICS & GYNECOLOGY | Admitting: OBSTETRICS & GYNECOLOGY
Payer: COMMERCIAL

## 2020-10-22 VITALS
HEART RATE: 90 BPM | TEMPERATURE: 98.1 F | SYSTOLIC BLOOD PRESSURE: 123 MMHG | HEIGHT: 67 IN | RESPIRATION RATE: 20 BRPM | DIASTOLIC BLOOD PRESSURE: 73 MMHG | BODY MASS INDEX: 44.36 KG/M2 | WEIGHT: 282.6 LBS

## 2020-10-22 DIAGNOSIS — Z34.83 PRENATAL CARE, SUBSEQUENT PREGNANCY IN THIRD TRIMESTER: Primary | ICD-10-CM

## 2020-10-22 DIAGNOSIS — Z87.19 HISTORY OF CHOLESTASIS DURING PREGNANCY: ICD-10-CM

## 2020-10-22 DIAGNOSIS — L29.9 ITCHING: ICD-10-CM

## 2020-10-22 DIAGNOSIS — Z87.59 HISTORY OF CHOLESTASIS DURING PREGNANCY: ICD-10-CM

## 2020-10-22 LAB
ALBUMIN SERPL-MCNC: 2.8 G/DL (ref 3.4–5)
ALP SERPL-CCNC: 159 U/L (ref 40–150)
ALT SERPL W P-5'-P-CCNC: 31 U/L (ref 0–50)
ANION GAP SERPL CALCULATED.3IONS-SCNC: 8 MMOL/L (ref 3–14)
AST SERPL W P-5'-P-CCNC: 17 U/L (ref 0–45)
BILIRUB SERPL-MCNC: 0.4 MG/DL (ref 0.2–1.3)
BUN SERPL-MCNC: 6 MG/DL (ref 7–30)
CALCIUM SERPL-MCNC: 9.2 MG/DL (ref 8.5–10.1)
CHLORIDE SERPL-SCNC: 108 MMOL/L (ref 94–109)
CO2 SERPL-SCNC: 25 MMOL/L (ref 20–32)
CREAT SERPL-MCNC: 0.61 MG/DL (ref 0.52–1.04)
GFR SERPL CREATININE-BSD FRML MDRD: >90 ML/MIN/{1.73_M2}
GLUCOSE SERPL-MCNC: 108 MG/DL (ref 70–99)
POTASSIUM SERPL-SCNC: 3.5 MMOL/L (ref 3.4–5.3)
PROT SERPL-MCNC: 6.7 G/DL (ref 6.8–8.8)
SODIUM SERPL-SCNC: 141 MMOL/L (ref 133–144)

## 2020-10-22 PROCEDURE — 36415 COLL VENOUS BLD VENIPUNCTURE: CPT | Performed by: OBSTETRICS & GYNECOLOGY

## 2020-10-22 PROCEDURE — 99207 PR PRENATAL VISIT: CPT | Performed by: OBSTETRICS & GYNECOLOGY

## 2020-10-22 PROCEDURE — 87653 STREP B DNA AMP PROBE: CPT | Performed by: OBSTETRICS & GYNECOLOGY

## 2020-10-22 PROCEDURE — 99000 SPECIMEN HANDLING OFFICE-LAB: CPT | Performed by: OBSTETRICS & GYNECOLOGY

## 2020-10-22 PROCEDURE — 80053 COMPREHEN METABOLIC PANEL: CPT | Performed by: OBSTETRICS & GYNECOLOGY

## 2020-10-22 PROCEDURE — 83789 MASS SPECTROMETRY QUAL/QUAN: CPT | Mod: 90 | Performed by: OBSTETRICS & GYNECOLOGY

## 2020-10-22 PROCEDURE — 76819 FETAL BIOPHYS PROFIL W/O NST: CPT

## 2020-10-22 ASSESSMENT — MIFFLIN-ST. JEOR: SCORE: 2049.5

## 2020-10-22 NOTE — PROGRESS NOTES
"CC: Here for routine prenatal visit @ 35w6d   HPI:  Continues to have feet itching, despite normal bile acids. Feels the same as with her son when she had cholestasis. Active baby. No LOF, VB or ctx.      PE:/73 (BP Location: Left arm, Cuff Size: Adult Large)   Pulse 90   Temp 98.1  F (36.7  C)   Resp 20   Ht 1.702 m (5' 7\")   Wt 128.2 kg (282 lb 9.6 oz)   LMP 02/14/2020   BMI 44.26 kg/m      See OB flowsheet        A:  1. Prenatal care, subsequent pregnancy in third trimester     - Bile acids fractionated and total     2. History of cholestasis during pregnancy     - Bile acids fractionated and total    Hx of c/s; scheduled repeat on 11/16.   Wants a pp hyst before the end of 2020. I recommended a 3wk pp consult in order to aide with scheduling, as this will get tight at the end of the year.   GBS collected.   Rh neg, s/p rhogam       Routine prenatal care  RTC 1 weeks.    Lesa Courtney MD  OB/GYN        "

## 2020-10-23 LAB
GP B STREP DNA SPEC QL NAA+PROBE: NEGATIVE
SPECIMEN SOURCE: NORMAL

## 2020-10-26 LAB
BILE AC SERPL-SCNC: 0.6 UMOL/L (ref 0–2.5)
CDCAE SERPL-SCNC: 0.7 UMOL/L (ref 0–3.4)
CHOLATE SERPL-SCNC: 2.1 UMOL/L (ref 0–7)
DO-CHOLATE SERPL-SCNC: 0.5 UMOL/L (ref 0–1.9)
URSODEOXYCHOLATE SERPL-SCNC: 0.3 UMOL/L (ref 0–1)

## 2020-10-29 ENCOUNTER — HOSPITAL ENCOUNTER (OUTPATIENT)
Dept: ULTRASOUND IMAGING | Facility: CLINIC | Age: 25
Discharge: HOME | End: 2020-10-29
Attending: OBSTETRICS & GYNECOLOGY | Admitting: OBSTETRICS & GYNECOLOGY
Payer: COMMERCIAL

## 2020-10-29 ENCOUNTER — PRENATAL OFFICE VISIT (OUTPATIENT)
Dept: OBGYN | Facility: CLINIC | Age: 25
End: 2020-10-29
Payer: COMMERCIAL

## 2020-10-29 VITALS
SYSTOLIC BLOOD PRESSURE: 125 MMHG | TEMPERATURE: 97.9 F | RESPIRATION RATE: 20 BRPM | DIASTOLIC BLOOD PRESSURE: 80 MMHG | WEIGHT: 287.1 LBS | BODY MASS INDEX: 45.06 KG/M2 | HEART RATE: 83 BPM | HEIGHT: 67 IN

## 2020-10-29 DIAGNOSIS — Z87.19 HISTORY OF CHOLESTASIS DURING PREGNANCY: ICD-10-CM

## 2020-10-29 DIAGNOSIS — Z34.83 ENCOUNTER FOR SUPERVISION OF OTHER NORMAL PREGNANCY IN THIRD TRIMESTER: Primary | ICD-10-CM

## 2020-10-29 DIAGNOSIS — Z87.59 HISTORY OF CHOLESTASIS DURING PREGNANCY: ICD-10-CM

## 2020-10-29 PROCEDURE — 99207 PR PRENATAL VISIT: CPT | Performed by: OBSTETRICS & GYNECOLOGY

## 2020-10-29 PROCEDURE — 76819 FETAL BIOPHYS PROFIL W/O NST: CPT

## 2020-10-29 ASSESSMENT — MIFFLIN-ST. JEOR: SCORE: 2069.91

## 2020-10-29 NOTE — PROGRESS NOTES
Concerns:   .  Doing well.  No concerns today.  No vaginal bleeding, LOF, contractions.  No HA, RUQ pain, N/V, visual changes.  C-sections discussed with the patient.  Risks include but are not limited to bleeding, infection, emergent hysterectomy, injury to bowel and bladder and other maternal organs as well as risk of injury to the fetus.  GBS done today.  Labor precautions discussed.  RTC 1 week.  Prenatal flowsheet information is reviewed.    Francis Somers MD

## 2020-11-05 ENCOUNTER — PRENATAL OFFICE VISIT (OUTPATIENT)
Dept: OBGYN | Facility: CLINIC | Age: 25
End: 2020-11-05
Payer: COMMERCIAL

## 2020-11-05 ENCOUNTER — HOSPITAL ENCOUNTER (OUTPATIENT)
Dept: ULTRASOUND IMAGING | Facility: CLINIC | Age: 25
Discharge: HOME | End: 2020-11-05
Attending: OBSTETRICS & GYNECOLOGY | Admitting: OBSTETRICS & GYNECOLOGY
Payer: COMMERCIAL

## 2020-11-05 VITALS
HEIGHT: 67 IN | BODY MASS INDEX: 45.2 KG/M2 | HEART RATE: 94 BPM | WEIGHT: 288 LBS | SYSTOLIC BLOOD PRESSURE: 127 MMHG | DIASTOLIC BLOOD PRESSURE: 77 MMHG | RESPIRATION RATE: 18 BRPM | TEMPERATURE: 98.7 F

## 2020-11-05 DIAGNOSIS — Z87.19 HISTORY OF CHOLESTASIS DURING PREGNANCY: ICD-10-CM

## 2020-11-05 DIAGNOSIS — Z87.59 HISTORY OF CHOLESTASIS DURING PREGNANCY: ICD-10-CM

## 2020-11-05 DIAGNOSIS — Z34.83 PRENATAL CARE, SUBSEQUENT PREGNANCY IN THIRD TRIMESTER: Primary | ICD-10-CM

## 2020-11-05 PROCEDURE — 76819 FETAL BIOPHYS PROFIL W/O NST: CPT

## 2020-11-05 PROCEDURE — 99207 PR PRENATAL VISIT: CPT | Performed by: OBSTETRICS & GYNECOLOGY

## 2020-11-05 ASSESSMENT — MIFFLIN-ST. JEOR: SCORE: 2073.99

## 2020-11-05 NOTE — NURSING NOTE
"Initial /77 (BP Location: Right arm, Patient Position: Chair, Cuff Size: Adult Large)   Pulse 94   Temp 98.7  F (37.1  C) (Tympanic)   Resp 18   Ht 1.702 m (5' 7\")   Wt 130.6 kg (288 lb)   LMP 02/14/2020   BMI 45.11 kg/m   Estimated body mass index is 45.11 kg/m  as calculated from the following:    Height as of this encounter: 1.702 m (5' 7\").    Weight as of this encounter: 130.6 kg (288 lb). .      "

## 2020-11-05 NOTE — PROGRESS NOTES
"CC: Here for routine prenatal visit @ 37w6d   HPI:  Feeling well; movements more spaced out every day; some BHC;no LOF    PE: /77 (BP Location: Right arm, Patient Position: Chair, Cuff Size: Adult Large)   Pulse 94   Temp 98.7  F (37.1  C) (Tympanic)   Resp 18   Ht 1.702 m (5' 7\")   Wt 130.6 kg (288 lb)   LMP 02/14/2020   BMI 45.11 kg/m     See OB flowsheet      A:  1. Prenatal care, subsequent pregnancy in third trimester        Routine prenatal care  RTC 1 weeks.      Jenny Cedeño M.D.     "

## 2020-11-11 ENCOUNTER — ANESTHESIA EVENT (OUTPATIENT)
Dept: SURGERY | Facility: CLINIC | Age: 25
End: 2020-11-11
Payer: COMMERCIAL

## 2020-11-11 ENCOUNTER — PRENATAL OFFICE VISIT (OUTPATIENT)
Dept: OBGYN | Facility: CLINIC | Age: 25
End: 2020-11-11
Payer: COMMERCIAL

## 2020-11-11 ENCOUNTER — HOSPITAL ENCOUNTER (INPATIENT)
Facility: CLINIC | Age: 25
LOS: 2 days | Discharge: HOME | End: 2020-11-13
Attending: OBSTETRICS & GYNECOLOGY | Admitting: OBSTETRICS & GYNECOLOGY
Payer: COMMERCIAL

## 2020-11-11 VITALS
TEMPERATURE: 98.9 F | RESPIRATION RATE: 16 BRPM | HEART RATE: 95 BPM | SYSTOLIC BLOOD PRESSURE: 133 MMHG | BODY MASS INDEX: 45.27 KG/M2 | WEIGHT: 288.4 LBS | DIASTOLIC BLOOD PRESSURE: 87 MMHG | HEIGHT: 67 IN

## 2020-11-11 DIAGNOSIS — Z98.891 HISTORY OF C-SECTION: ICD-10-CM

## 2020-11-11 DIAGNOSIS — Z30.2 ENCOUNTER FOR STERILIZATION: ICD-10-CM

## 2020-11-11 DIAGNOSIS — Z34.83 PRENATAL CARE, SUBSEQUENT PREGNANCY IN THIRD TRIMESTER: Primary | ICD-10-CM

## 2020-11-11 DIAGNOSIS — Z34.83 PRENATAL CARE, SUBSEQUENT PREGNANCY IN THIRD TRIMESTER: ICD-10-CM

## 2020-11-11 DIAGNOSIS — O26.899 RH NEGATIVE, ANTEPARTUM: ICD-10-CM

## 2020-11-11 DIAGNOSIS — Z67.91 RH NEGATIVE, ANTEPARTUM: ICD-10-CM

## 2020-11-11 DIAGNOSIS — Z98.891 S/P CESAREAN SECTION: Primary | ICD-10-CM

## 2020-11-11 PROBLEM — Z98.890 POSTOPERATIVE STATE: Status: ACTIVE | Noted: 2020-11-11

## 2020-11-11 LAB
ABO + RH BLD: NORMAL
ABO + RH BLD: NORMAL
ALT SERPL W P-5'-P-CCNC: 27 U/L (ref 0–50)
AST SERPL W P-5'-P-CCNC: 18 U/L (ref 0–45)
BLD GP AB SCN SERPL QL: NORMAL
BLOOD BANK CMNT PATIENT-IMP: NORMAL
CREAT SERPL-MCNC: 0.68 MG/DL (ref 0.52–1.04)
ERYTHROCYTE [DISTWIDTH] IN BLOOD BY AUTOMATED COUNT: 14.5 % (ref 10–15)
GFR SERPL CREATININE-BSD FRML MDRD: >90 ML/MIN/{1.73_M2}
HCT VFR BLD AUTO: 40.9 % (ref 35–47)
HGB BLD-MCNC: 13.2 G/DL (ref 11.7–15.7)
HGB BLD-MCNC: 13.2 G/DL (ref 11.7–15.7)
LABORATORY COMMENT REPORT: NORMAL
MCH RBC QN AUTO: 29.9 PG (ref 26.5–33)
MCHC RBC AUTO-ENTMCNC: 32.3 G/DL (ref 31.5–36.5)
MCV RBC AUTO: 93 FL (ref 78–100)
PLATELET # BLD AUTO: 247 10E9/L (ref 150–450)
RBC # BLD AUTO: 4.42 10E12/L (ref 3.8–5.2)
SARS-COV-2 RNA SPEC QL NAA+PROBE: NEGATIVE
SARS-COV-2 RNA SPEC QL NAA+PROBE: NORMAL
SPECIMEN EXP DATE BLD: NORMAL
SPECIMEN SOURCE: NORMAL
SPECIMEN SOURCE: NORMAL
WBC # BLD AUTO: 10.9 10E9/L (ref 4–11)

## 2020-11-11 PROCEDURE — 120N000001 HC R&B MED SURG/OB

## 2020-11-11 PROCEDURE — 360N000020 HC SURGERY LEVEL 3 1ST 30 MIN: Performed by: OBSTETRICS & GYNECOLOGY

## 2020-11-11 PROCEDURE — 250N000013 HC RX MED GY IP 250 OP 250 PS 637: Performed by: OBSTETRICS & GYNECOLOGY

## 2020-11-11 PROCEDURE — 250N000011 HC RX IP 250 OP 636: Performed by: OBSTETRICS & GYNECOLOGY

## 2020-11-11 PROCEDURE — 86780 TREPONEMA PALLIDUM: CPT | Performed by: OBSTETRICS & GYNECOLOGY

## 2020-11-11 PROCEDURE — 271N000001 HC OR GENERAL SUPPLY NON-STERILE: Performed by: OBSTETRICS & GYNECOLOGY

## 2020-11-11 PROCEDURE — 250N000009 HC RX 250: Performed by: NURSE ANESTHETIST, CERTIFIED REGISTERED

## 2020-11-11 PROCEDURE — 86900 BLOOD TYPING SEROLOGIC ABO: CPT | Performed by: OBSTETRICS & GYNECOLOGY

## 2020-11-11 PROCEDURE — 370N000002 HC ANESTHESIA TECHNICAL FEE, EACH ADDTL 15 MIN: Performed by: OBSTETRICS & GYNECOLOGY

## 2020-11-11 PROCEDURE — 360N000021 HC SURGERY LEVEL 3 EA 15 ADDTL MIN: Performed by: OBSTETRICS & GYNECOLOGY

## 2020-11-11 PROCEDURE — 761N000001 HC RECOVERY PHASE 1 LEVEL 1 FIRST HR: Performed by: OBSTETRICS & GYNECOLOGY

## 2020-11-11 PROCEDURE — 59510 CESAREAN DELIVERY: CPT | Performed by: OBSTETRICS & GYNECOLOGY

## 2020-11-11 PROCEDURE — 272N000001 HC OR GENERAL SUPPLY STERILE: Performed by: OBSTETRICS & GYNECOLOGY

## 2020-11-11 PROCEDURE — 84450 TRANSFERASE (AST) (SGOT): CPT | Performed by: OBSTETRICS & GYNECOLOGY

## 2020-11-11 PROCEDURE — 370N000001 HC ANESTHESIA TECHNICAL FEE, 1ST 30 MIN: Performed by: OBSTETRICS & GYNECOLOGY

## 2020-11-11 PROCEDURE — 250N000009 HC RX 250: Performed by: OBSTETRICS & GYNECOLOGY

## 2020-11-11 PROCEDURE — 84460 ALANINE AMINO (ALT) (SGPT): CPT | Performed by: OBSTETRICS & GYNECOLOGY

## 2020-11-11 PROCEDURE — U0003 INFECTIOUS AGENT DETECTION BY NUCLEIC ACID (DNA OR RNA); SEVERE ACUTE RESPIRATORY SYNDROME CORONAVIRUS 2 (SARS-COV-2) (CORONAVIRUS DISEASE [COVID-19]), AMPLIFIED PROBE TECHNIQUE, MAKING USE OF HIGH THROUGHPUT TECHNOLOGIES AS DESCRIBED BY CMS-2020-01-R: HCPCS | Performed by: OBSTETRICS & GYNECOLOGY

## 2020-11-11 PROCEDURE — 258N000003 HC RX IP 258 OP 636: Performed by: OBSTETRICS & GYNECOLOGY

## 2020-11-11 PROCEDURE — 86901 BLOOD TYPING SEROLOGIC RH(D): CPT | Performed by: OBSTETRICS & GYNECOLOGY

## 2020-11-11 PROCEDURE — 85018 HEMOGLOBIN: CPT | Performed by: OBSTETRICS & GYNECOLOGY

## 2020-11-11 PROCEDURE — 86850 RBC ANTIBODY SCREEN: CPT | Performed by: OBSTETRICS & GYNECOLOGY

## 2020-11-11 PROCEDURE — 59514 CESAREAN DELIVERY ONLY: CPT | Mod: AS | Performed by: NURSE PRACTITIONER

## 2020-11-11 PROCEDURE — 761N000002 HC RECOVERY PHASE 1 LEVEL 1 EA ADDTL HR: Performed by: OBSTETRICS & GYNECOLOGY

## 2020-11-11 PROCEDURE — 36415 COLL VENOUS BLD VENIPUNCTURE: CPT | Performed by: OBSTETRICS & GYNECOLOGY

## 2020-11-11 PROCEDURE — 250N000011 HC RX IP 250 OP 636: Performed by: NURSE ANESTHETIST, CERTIFIED REGISTERED

## 2020-11-11 PROCEDURE — 99207 PR PRENATAL VISIT: CPT | Performed by: OBSTETRICS & GYNECOLOGY

## 2020-11-11 PROCEDURE — 85027 COMPLETE CBC AUTOMATED: CPT | Performed by: OBSTETRICS & GYNECOLOGY

## 2020-11-11 PROCEDURE — C9290 INJ, BUPIVACAINE LIPOSOME: HCPCS | Performed by: NURSE ANESTHETIST, CERTIFIED REGISTERED

## 2020-11-11 PROCEDURE — 82565 ASSAY OF CREATININE: CPT | Performed by: OBSTETRICS & GYNECOLOGY

## 2020-11-11 RX ORDER — LIDOCAINE HYDROCHLORIDE 10 MG/ML
INJECTION, SOLUTION EPIDURAL; INFILTRATION; INTRACAUDAL; PERINEURAL PRN
Status: DISCONTINUED | OUTPATIENT
Start: 2020-11-11 | End: 2020-11-11

## 2020-11-11 RX ORDER — TRANEXAMIC ACID 10 MG/ML
1 INJECTION, SOLUTION INTRAVENOUS EVERY 30 MIN PRN
Status: DISCONTINUED | OUTPATIENT
Start: 2020-11-11 | End: 2020-11-13 | Stop reason: HOSPADM

## 2020-11-11 RX ORDER — MEPERIDINE HYDROCHLORIDE 25 MG/ML
12.5 INJECTION INTRAMUSCULAR; INTRAVENOUS; SUBCUTANEOUS EVERY 5 MIN PRN
Status: DISCONTINUED | OUTPATIENT
Start: 2020-11-11 | End: 2020-11-13 | Stop reason: HOSPADM

## 2020-11-11 RX ORDER — DEXTROSE, SODIUM CHLORIDE, SODIUM LACTATE, POTASSIUM CHLORIDE, AND CALCIUM CHLORIDE 5; .6; .31; .03; .02 G/100ML; G/100ML; G/100ML; G/100ML; G/100ML
INJECTION, SOLUTION INTRAVENOUS CONTINUOUS
Status: DISCONTINUED | OUTPATIENT
Start: 2020-11-11 | End: 2020-11-13 | Stop reason: HOSPADM

## 2020-11-11 RX ORDER — DIPHENHYDRAMINE HCL 25 MG
25 CAPSULE ORAL EVERY 6 HOURS PRN
Status: DISCONTINUED | OUTPATIENT
Start: 2020-11-11 | End: 2020-11-13 | Stop reason: HOSPADM

## 2020-11-11 RX ORDER — KETOROLAC TROMETHAMINE 30 MG/ML
INJECTION, SOLUTION INTRAMUSCULAR; INTRAVENOUS PRN
Status: DISCONTINUED | OUTPATIENT
Start: 2020-11-11 | End: 2020-11-11

## 2020-11-11 RX ORDER — BISACODYL 10 MG
10 SUPPOSITORY, RECTAL RECTAL DAILY PRN
Status: DISCONTINUED | OUTPATIENT
Start: 2020-11-13 | End: 2020-11-13 | Stop reason: HOSPADM

## 2020-11-11 RX ORDER — KETOROLAC TROMETHAMINE 30 MG/ML
30 INJECTION, SOLUTION INTRAMUSCULAR; INTRAVENOUS EVERY 6 HOURS
Status: COMPLETED | OUTPATIENT
Start: 2020-11-12 | End: 2020-11-12

## 2020-11-11 RX ORDER — CEFAZOLIN SODIUM 1 G/50ML
1 INJECTION, SOLUTION INTRAVENOUS SEE ADMIN INSTRUCTIONS
Status: DISCONTINUED | OUTPATIENT
Start: 2020-11-11 | End: 2020-11-11 | Stop reason: HOSPADM

## 2020-11-11 RX ORDER — EPINEPHRINE 1 MG/ML
INJECTION, SOLUTION, CONCENTRATE INTRAVENOUS PRN
Status: DISCONTINUED | OUTPATIENT
Start: 2020-11-11 | End: 2020-11-11

## 2020-11-11 RX ORDER — DEXAMETHASONE SODIUM PHOSPHATE 4 MG/ML
INJECTION, SOLUTION INTRA-ARTICULAR; INTRALESIONAL; INTRAMUSCULAR; INTRAVENOUS; SOFT TISSUE PRN
Status: DISCONTINUED | OUTPATIENT
Start: 2020-11-11 | End: 2020-11-11

## 2020-11-11 RX ORDER — AMOXICILLIN 250 MG
1 CAPSULE ORAL 2 TIMES DAILY
Status: DISCONTINUED | OUTPATIENT
Start: 2020-11-11 | End: 2020-11-13 | Stop reason: HOSPADM

## 2020-11-11 RX ORDER — OXYTOCIN/0.9 % SODIUM CHLORIDE 30/500 ML
PLASTIC BAG, INJECTION (ML) INTRAVENOUS PRN
Status: DISCONTINUED | OUTPATIENT
Start: 2020-11-11 | End: 2020-11-11

## 2020-11-11 RX ORDER — CEFAZOLIN SODIUM IN 0.9 % NACL 3 G/100 ML
3 INTRAVENOUS SOLUTION, PIGGYBACK (ML) INTRAVENOUS
Status: COMPLETED | OUTPATIENT
Start: 2020-11-11 | End: 2020-11-11

## 2020-11-11 RX ORDER — FENTANYL CITRATE-0.9 % NACL/PF 10 MCG/ML
PLASTIC BAG, INJECTION (ML) INTRAVENOUS CONTINUOUS PRN
Status: DISCONTINUED | OUTPATIENT
Start: 2020-11-11 | End: 2020-11-11

## 2020-11-11 RX ORDER — OXYTOCIN 10 [USP'U]/ML
10 INJECTION, SOLUTION INTRAMUSCULAR; INTRAVENOUS
Status: DISCONTINUED | OUTPATIENT
Start: 2020-11-11 | End: 2020-11-13 | Stop reason: HOSPADM

## 2020-11-11 RX ORDER — MAGNESIUM HYDROXIDE 1200 MG/15ML
LIQUID ORAL PRN
Status: DISCONTINUED | OUTPATIENT
Start: 2020-11-11 | End: 2020-11-11 | Stop reason: HOSPADM

## 2020-11-11 RX ORDER — OXYTOCIN/0.9 % SODIUM CHLORIDE 30/500 ML
PLASTIC BAG, INJECTION (ML) INTRAVENOUS
Status: DISCONTINUED
Start: 2020-11-11 | End: 2020-11-11 | Stop reason: HOSPADM

## 2020-11-11 RX ORDER — SIMETHICONE 80 MG
80 TABLET,CHEWABLE ORAL 4 TIMES DAILY PRN
Status: DISCONTINUED | OUTPATIENT
Start: 2020-11-11 | End: 2020-11-13 | Stop reason: HOSPADM

## 2020-11-11 RX ORDER — ONDANSETRON 2 MG/ML
4 INJECTION INTRAMUSCULAR; INTRAVENOUS EVERY 30 MIN PRN
Status: DISCONTINUED | OUTPATIENT
Start: 2020-11-11 | End: 2020-11-13 | Stop reason: HOSPADM

## 2020-11-11 RX ORDER — ONDANSETRON 2 MG/ML
INJECTION INTRAMUSCULAR; INTRAVENOUS PRN
Status: DISCONTINUED | OUTPATIENT
Start: 2020-11-11 | End: 2020-11-11

## 2020-11-11 RX ORDER — CITRIC ACID/SODIUM CITRATE 334-500MG
30 SOLUTION, ORAL ORAL
Status: COMPLETED | OUTPATIENT
Start: 2020-11-11 | End: 2020-11-11

## 2020-11-11 RX ORDER — OXYTOCIN/0.9 % SODIUM CHLORIDE 30/500 ML
340 PLASTIC BAG, INJECTION (ML) INTRAVENOUS CONTINUOUS PRN
Status: DISCONTINUED | OUTPATIENT
Start: 2020-11-11 | End: 2020-11-13 | Stop reason: HOSPADM

## 2020-11-11 RX ORDER — NALOXONE HYDROCHLORIDE 0.4 MG/ML
.1-.4 INJECTION, SOLUTION INTRAMUSCULAR; INTRAVENOUS; SUBCUTANEOUS
Status: ACTIVE | OUTPATIENT
Start: 2020-11-11 | End: 2020-11-12

## 2020-11-11 RX ORDER — SODIUM CHLORIDE, SODIUM LACTATE, POTASSIUM CHLORIDE, CALCIUM CHLORIDE 600; 310; 30; 20 MG/100ML; MG/100ML; MG/100ML; MG/100ML
INJECTION, SOLUTION INTRAVENOUS CONTINUOUS
Status: DISCONTINUED | OUTPATIENT
Start: 2020-11-11 | End: 2020-11-11 | Stop reason: HOSPADM

## 2020-11-11 RX ORDER — ACETAMINOPHEN 325 MG/1
975 TABLET ORAL ONCE
Status: COMPLETED | OUTPATIENT
Start: 2020-11-11 | End: 2020-11-11

## 2020-11-11 RX ORDER — ACETAMINOPHEN 325 MG/1
975 TABLET ORAL EVERY 6 HOURS
Status: DISCONTINUED | OUTPATIENT
Start: 2020-11-12 | End: 2020-11-13 | Stop reason: HOSPADM

## 2020-11-11 RX ORDER — HYDRALAZINE HYDROCHLORIDE 20 MG/ML
2.5-5 INJECTION INTRAMUSCULAR; INTRAVENOUS EVERY 10 MIN PRN
Status: DISCONTINUED | OUTPATIENT
Start: 2020-11-11 | End: 2020-11-13 | Stop reason: HOSPADM

## 2020-11-11 RX ORDER — MODIFIED LANOLIN
OINTMENT (GRAM) TOPICAL
Status: DISCONTINUED | OUTPATIENT
Start: 2020-11-11 | End: 2020-11-13 | Stop reason: HOSPADM

## 2020-11-11 RX ORDER — HYDROMORPHONE HYDROCHLORIDE 1 MG/ML
.3-.5 INJECTION, SOLUTION INTRAMUSCULAR; INTRAVENOUS; SUBCUTANEOUS EVERY 5 MIN PRN
Status: DISCONTINUED | OUTPATIENT
Start: 2020-11-11 | End: 2020-11-13 | Stop reason: HOSPADM

## 2020-11-11 RX ORDER — SODIUM CHLORIDE, SODIUM LACTATE, POTASSIUM CHLORIDE, CALCIUM CHLORIDE 600; 310; 30; 20 MG/100ML; MG/100ML; MG/100ML; MG/100ML
INJECTION, SOLUTION INTRAVENOUS CONTINUOUS
Status: DISCONTINUED | OUTPATIENT
Start: 2020-11-11 | End: 2020-11-13 | Stop reason: HOSPADM

## 2020-11-11 RX ORDER — LIDOCAINE 40 MG/G
CREAM TOPICAL
Status: DISCONTINUED | OUTPATIENT
Start: 2020-11-11 | End: 2020-11-13 | Stop reason: HOSPADM

## 2020-11-11 RX ORDER — OXYTOCIN/0.9 % SODIUM CHLORIDE 30/500 ML
100 PLASTIC BAG, INJECTION (ML) INTRAVENOUS CONTINUOUS
Status: DISCONTINUED | OUTPATIENT
Start: 2020-11-11 | End: 2020-11-13 | Stop reason: HOSPADM

## 2020-11-11 RX ORDER — IBUPROFEN 800 MG/1
800 TABLET, FILM COATED ORAL EVERY 6 HOURS
Status: DISCONTINUED | OUTPATIENT
Start: 2020-11-12 | End: 2020-11-13 | Stop reason: HOSPADM

## 2020-11-11 RX ORDER — AMOXICILLIN 250 MG
2 CAPSULE ORAL 2 TIMES DAILY
Status: DISCONTINUED | OUTPATIENT
Start: 2020-11-11 | End: 2020-11-13 | Stop reason: HOSPADM

## 2020-11-11 RX ORDER — ONDANSETRON 4 MG/1
4 TABLET, ORALLY DISINTEGRATING ORAL EVERY 30 MIN PRN
Status: DISCONTINUED | OUTPATIENT
Start: 2020-11-11 | End: 2020-11-13 | Stop reason: HOSPADM

## 2020-11-11 RX ORDER — CETIRIZINE HYDROCHLORIDE 10 MG/1
10 TABLET ORAL DAILY
Status: DISCONTINUED | OUTPATIENT
Start: 2020-11-12 | End: 2020-11-13 | Stop reason: HOSPADM

## 2020-11-11 RX ORDER — BUPIVACAINE HYDROCHLORIDE 7.5 MG/ML
INJECTION, SOLUTION INTRASPINAL PRN
Status: DISCONTINUED | OUTPATIENT
Start: 2020-11-11 | End: 2020-11-11

## 2020-11-11 RX ORDER — ONDANSETRON 2 MG/ML
4 INJECTION INTRAMUSCULAR; INTRAVENOUS EVERY 6 HOURS PRN
Status: DISCONTINUED | OUTPATIENT
Start: 2020-11-11 | End: 2020-11-13 | Stop reason: HOSPADM

## 2020-11-11 RX ORDER — OXYCODONE HYDROCHLORIDE 5 MG/1
5 TABLET ORAL EVERY 4 HOURS PRN
Status: DISCONTINUED | OUTPATIENT
Start: 2020-11-11 | End: 2020-11-12 | Stop reason: CLARIF

## 2020-11-11 RX ORDER — KETOROLAC TROMETHAMINE 30 MG/ML
30 INJECTION, SOLUTION INTRAMUSCULAR; INTRAVENOUS
Status: DISCONTINUED | OUTPATIENT
Start: 2020-11-11 | End: 2020-11-11

## 2020-11-11 RX ORDER — HYDROCORTISONE 2.5 %
CREAM (GRAM) TOPICAL 3 TIMES DAILY PRN
Status: DISCONTINUED | OUTPATIENT
Start: 2020-11-11 | End: 2020-11-13 | Stop reason: HOSPADM

## 2020-11-11 RX ORDER — FENTANYL CITRATE 50 UG/ML
25-50 INJECTION, SOLUTION INTRAMUSCULAR; INTRAVENOUS
Status: DISCONTINUED | OUTPATIENT
Start: 2020-11-11 | End: 2020-11-13 | Stop reason: HOSPADM

## 2020-11-11 RX ADMIN — KETOROLAC TROMETHAMINE 30 MG: 30 INJECTION, SOLUTION INTRAMUSCULAR at 20:59

## 2020-11-11 RX ADMIN — LIDOCAINE HYDROCHLORIDE 5 ML: 10 INJECTION, SOLUTION EPIDURAL; INFILTRATION; INTRACAUDAL; PERINEURAL at 20:14

## 2020-11-11 RX ADMIN — DEXAMETHASONE SODIUM PHOSPHATE 8 MG: 4 INJECTION, SOLUTION INTRA-ARTICULAR; INTRALESIONAL; INTRAMUSCULAR; INTRAVENOUS; SOFT TISSUE at 20:20

## 2020-11-11 RX ADMIN — ACETAMINOPHEN 975 MG: 325 TABLET, FILM COATED ORAL at 19:47

## 2020-11-11 RX ADMIN — BUPIVACAINE 10 ML: 13.3 INJECTION, SUSPENSION, LIPOSOMAL INFILTRATION at 21:15

## 2020-11-11 RX ADMIN — OXYCODONE HYDROCHLORIDE 5 MG: 5 TABLET ORAL at 23:40

## 2020-11-11 RX ADMIN — Medication 3 G: at 19:37

## 2020-11-11 RX ADMIN — Medication 200 ML: at 20:59

## 2020-11-11 RX ADMIN — SODIUM CITRATE AND CITRIC ACID MONOHYDRATE 30 ML: 500; 334 SOLUTION ORAL at 19:47

## 2020-11-11 RX ADMIN — BUPIVACAINE HYDROCHLORIDE IN DEXTROSE 1.8 MG: 7.5 INJECTION, SOLUTION SUBARACHNOID at 20:16

## 2020-11-11 RX ADMIN — Medication 10 MCG/MIN: at 20:18

## 2020-11-11 RX ADMIN — SODIUM CHLORIDE, POTASSIUM CHLORIDE, SODIUM LACTATE AND CALCIUM CHLORIDE: 600; 310; 30; 20 INJECTION, SOLUTION INTRAVENOUS at 19:36

## 2020-11-11 RX ADMIN — BUPIVACAINE 10 ML: 13.3 INJECTION, SUSPENSION, LIPOSOMAL INFILTRATION at 21:10

## 2020-11-11 RX ADMIN — Medication 100 ML: at 20:37

## 2020-11-11 RX ADMIN — EPINEPHRINE 0.2 MG: 1 INJECTION, SOLUTION INTRAMUSCULAR; SUBCUTANEOUS at 20:16

## 2020-11-11 RX ADMIN — ONDANSETRON 4 MG: 2 INJECTION INTRAMUSCULAR; INTRAVENOUS at 20:58

## 2020-11-11 ASSESSMENT — ACTIVITIES OF DAILY LIVING (ADL)
TOILETING_ISSUES: NO
FALL_HISTORY_WITHIN_LAST_SIX_MONTHS: NO

## 2020-11-11 ASSESSMENT — MIFFLIN-ST. JEOR: SCORE: 2075.8

## 2020-11-11 NOTE — NURSING NOTE
"Initial /87 (BP Location: Left arm, Patient Position: Chair, Cuff Size: Adult Large)   Pulse 95   Temp 98.9  F (37.2  C) (Tympanic)   Resp 16   Ht 1.702 m (5' 7\")   Wt 130.8 kg (288 lb 6.4 oz)   LMP 02/14/2020   BMI 45.17 kg/m   Estimated body mass index is 45.17 kg/m  as calculated from the following:    Height as of this encounter: 1.702 m (5' 7\").    Weight as of this encounter: 130.8 kg (288 lb 6.4 oz). .      "

## 2020-11-11 NOTE — PROGRESS NOTES
"CC: Here for routine prenatal visit @ 38w5d   HPI:  Presents today given new onset contractions. Coming every 20 min. Not particularly painful, but feeling more vaginal pressure and uncomfortable. Active baby. No LOF or VB. Wants to get checked out to see what the chances are she will go into labor over the weekend. Itching has resolved.      PE: /87 (BP Location: Left arm, Patient Position: Chair, Cuff Size: Adult Large)   Pulse 95   Temp 98.9  F (37.2  C) (Tympanic)   Resp 16   Ht 1.702 m (5' 7\")   Wt 130.8 kg (288 lb 6.4 oz)   LMP 2020   BMI 45.17 kg/m       See OB flowsheet        A/P: 26 yo  at 38w5d  Irregular contractions: I did review with her that she has made subtle cervical change since her last exam, but that her subjective history is not consistent with labor. Reviewed labor precautions strictly (call [facility] with painful ctx less tahn 5 min apart). Reviewed that it is difficult to predict when labor will occur.   Hx of c/s; scheduled repeat on .   Rh neg, s/p rhogam       Routine prenatal care     Lesa Courtney MD  OB/GYN         "

## 2020-11-12 LAB
HGB BLD-MCNC: 11.7 G/DL (ref 11.7–15.7)
T PALLIDUM AB SER QL: NONREACTIVE

## 2020-11-12 PROCEDURE — 36415 COLL VENOUS BLD VENIPUNCTURE: CPT | Performed by: OBSTETRICS & GYNECOLOGY

## 2020-11-12 PROCEDURE — 85018 HEMOGLOBIN: CPT | Performed by: OBSTETRICS & GYNECOLOGY

## 2020-11-12 PROCEDURE — 250N000011 HC RX IP 250 OP 636: Performed by: OBSTETRICS & GYNECOLOGY

## 2020-11-12 PROCEDURE — 120N000001 HC R&B MED SURG/OB

## 2020-11-12 PROCEDURE — 250N000013 HC RX MED GY IP 250 OP 250 PS 637: Performed by: OBSTETRICS & GYNECOLOGY

## 2020-11-12 RX ORDER — OXYCODONE HYDROCHLORIDE 5 MG/1
5-10 TABLET ORAL
Status: DISCONTINUED | OUTPATIENT
Start: 2020-11-12 | End: 2020-11-13 | Stop reason: HOSPADM

## 2020-11-12 RX ADMIN — OXYCODONE HYDROCHLORIDE 10 MG: 5 TABLET ORAL at 20:42

## 2020-11-12 RX ADMIN — CETIRIZINE HYDROCHLORIDE 10 MG: 10 TABLET, FILM COATED ORAL at 08:01

## 2020-11-12 RX ADMIN — IBUPROFEN 800 MG: 800 TABLET ORAL at 22:33

## 2020-11-12 RX ADMIN — KETOROLAC TROMETHAMINE 30 MG: 30 INJECTION, SOLUTION INTRAMUSCULAR at 15:30

## 2020-11-12 RX ADMIN — OXYCODONE HYDROCHLORIDE 10 MG: 5 TABLET ORAL at 13:33

## 2020-11-12 RX ADMIN — OXYCODONE HYDROCHLORIDE 10 MG: 5 TABLET ORAL at 10:16

## 2020-11-12 RX ADMIN — OXYCODONE HYDROCHLORIDE 10 MG: 5 TABLET ORAL at 06:26

## 2020-11-12 RX ADMIN — DOCUSATE SODIUM AND SENNOSIDES 2 TABLET: 8.6; 5 TABLET ORAL at 08:01

## 2020-11-12 RX ADMIN — DOCUSATE SODIUM AND SENNOSIDES 2 TABLET: 8.6; 5 TABLET ORAL at 17:26

## 2020-11-12 RX ADMIN — ACETAMINOPHEN 975 MG: 325 TABLET, FILM COATED ORAL at 08:01

## 2020-11-12 RX ADMIN — ACETAMINOPHEN 975 MG: 325 TABLET, FILM COATED ORAL at 02:37

## 2020-11-12 RX ADMIN — OXYCODONE HYDROCHLORIDE 5 MG: 5 TABLET ORAL at 03:40

## 2020-11-12 RX ADMIN — OXYCODONE HYDROCHLORIDE 5 MG: 5 TABLET ORAL at 03:17

## 2020-11-12 RX ADMIN — KETOROLAC TROMETHAMINE 30 MG: 30 INJECTION, SOLUTION INTRAMUSCULAR at 08:29

## 2020-11-12 RX ADMIN — KETOROLAC TROMETHAMINE 30 MG: 30 INJECTION, SOLUTION INTRAMUSCULAR at 02:37

## 2020-11-12 RX ADMIN — ACETAMINOPHEN 975 MG: 325 TABLET, FILM COATED ORAL at 20:42

## 2020-11-12 RX ADMIN — OXYCODONE HYDROCHLORIDE 10 MG: 5 TABLET ORAL at 17:25

## 2020-11-12 RX ADMIN — ACETAMINOPHEN 975 MG: 325 TABLET, FILM COATED ORAL at 13:33

## 2020-11-12 NOTE — PLAN OF CARE
Patient complaining on neck pain on the right side. Patient states it started after the C/S, feels she might have strained it.  Warm packs applied with no decrease in discomfort.  Medicated with Toradol, will reassess. Patient would like to lay flat and nap later,  has let for the night. Instructed to use call light if needed help with baby.

## 2020-11-12 NOTE — PROGRESS NOTES
6 mo fu Dr ABIGAIL Courtney has been here to see patient and  has determined that Dalila Galvez should be readied for unscheduled  section for grossly SROM. Plan of care reviewed with patient and support person. Questions answered and concerns addressed by MD. Patient agrees with plan of care. Consent signed. Anesthesia notified. IV  fluids infusing well. Gigi cloth applied to abdomen. SCD's applied bilaterally. Bicitra given. IV antibiotic initiated. Patient has been NPO since 1500. Elisa Sesay CRNA has been notified. Ready for surgery. To OR per bed.

## 2020-11-12 NOTE — PROGRESS NOTES
PPD # 1    S: patient without complaints.  Ambulating and voiding.  Pain adequately controlled.    O: /71   Pulse 83   Temp 97.9  F (36.6  C) (Oral)   Resp 16   Wt 130.6 kg (288 lb)   LMP 2020   SpO2 97%   BMI 45.11 kg/m     NAD  Abd: soft, nontender, fundus firm  Inc: clean and intact  Ext: calves nontender    Hemoglobin   Date Value Ref Range Status   2020 11.7 11.7 - 15.7 g/dL Final       A/P PPD # 1 s/p low transverse  section     Routine PP care.    Maida Fair M.D.

## 2020-11-12 NOTE — ANESTHESIA PREPROCEDURE EVALUATION
Anesthesia Pre-Procedure Evaluation    Patient: Dalila Galvez   MRN: [id number] : 1995          Preoperative Diagnosis: History of  [Z98.891]  Encounter for sterilization [Z30.2]  Prenatal care, subsequent pregnancy in third trimester [Z34.83]    Procedure(s):   SECTION, WITH POSTPARTUM TUBAL LIGATION    Past Medical History:   Diagnosis Date     Chickenpox      Past Surgical History:   Procedure Laterality Date      SECTION N/A 2019    Procedure:  SECTION;  Surgeon: Francis Somers MD;  Location: [facility] OR       Anesthesia Evaluation     . Pt has had prior anesthetic. Type: General and Regional           ROS/MED HX    ENT/Pulmonary:  - neg pulmonary ROS     Neurologic:  - neg neurologic ROS     Cardiovascular:  - neg cardiovascular ROS       METS/Exercise Tolerance:     Hematologic:         Musculoskeletal:  - neg musculoskeletal ROS       GI/Hepatic:  - neg GI/hepatic ROS       Renal/Genitourinary:  - ROS Renal section negative       Endo:  - neg endo ROS       Psychiatric:  - neg psychiatric ROS       Infectious Disease:  - neg infectious disease ROS       Malignancy:      - no malignancy   Other:    - neg other ROS                      Physical Exam  Normal systems: cardiovascular, pulmonary and dental    Airway   Mallampati: II  TM distance: >3 FB  Neck ROM: full    Dental     Cardiovascular       Pulmonary             Lab Results   Component Value Date    WBC 9.8 2020    HGB 13.2 2020    HCT 38.7 2020     2020     10/22/2020    POTASSIUM 3.5 10/22/2020    CHLORIDE 108 10/22/2020    CO2 25 10/22/2020    BUN 6 (L) 10/22/2020    CR 0.61 10/22/2020     (H) 10/22/2020    STEVEN 9.2 10/22/2020    ALBUMIN 2.8 (L) 10/22/2020    PROTTOTAL 6.7 (L) 10/22/2020    ALT 31 10/22/2020    AST 17 10/22/2020    ALKPHOS 159 (H) 10/22/2020    BILITOTAL 0.4 10/22/2020       Preop Vitals  BP Readings from Last 3 Encounters:   20  "133/87   11/05/20 127/77   10/29/20 125/80    Pulse Readings from Last 3 Encounters:   11/11/20 95   11/05/20 94   10/29/20 83      Resp Readings from Last 3 Encounters:   11/11/20 16   11/05/20 18   10/29/20 20    SpO2 Readings from Last 3 Encounters:   02/23/19 95%      Temp Readings from Last 1 Encounters:   11/11/20 37.2  C (98.9  F) (Tympanic)    Ht Readings from Last 1 Encounters:   11/11/20 1.702 m (5' 7\")      Wt Readings from Last 1 Encounters:   11/11/20 130.8 kg (288 lb 6.4 oz)    Estimated body mass index is 45.17 kg/m  as calculated from the following:    Height as of an earlier encounter on 11/11/20: 1.702 m (5' 7\").    Weight as of an earlier encounter on 11/11/20: 130.8 kg (288 lb 6.4 oz).       Anesthesia Plan      History & Physical Review  History and physical reviewed and following examination; no interval change.    ASA Status:  2 .    NPO Status:  > 6 hours    Plan for Spinal and Peripheral Nerve Block   PONV prophylaxis:  Ondansetron (or other 5HT-3) and Dexamethasone or Solumedrol         Postoperative Care  Postoperative pain management:  IV analgesics and Oral pain medications.      Consents  Anesthetic plan, risks, benefits and alternatives discussed with:  Patient..                 AP Morelos CRNA  "

## 2020-11-12 NOTE — PLAN OF CARE
Pt follows PP pathway with the exception of adequate pain control; she is currently utilizing tylenol, toradol & oxycodone for pain, also an ice pack.  Tolerates po food & fluids, denies nausea.  Benson removed, she is up voiding; passed one clot that was unable to be weighed.  IV SL.  Reports improvement with pain after getting up out of bed.  Plan to continue to monitor.  She is breastfeeding her baby, that is going well.  FOB is present & supportive, bonding well; infant rooming in with parents tonight.

## 2020-11-12 NOTE — OP NOTE
[facility]  Section Operative Note    Patient Name: Dalila Galvez  MRN:[id number]  : 1995  Date of Surgery: 20   Pre-operative diagnosis:  1. IUP at 38w5d  2. History of prior  delivery, planning repeat  3. Spontaneous rupture of membranes   Post-operative diagnosis:  Same   Procedure:  Repeatcesarean section via pfannenstiel skin incision with double layer uterine closure   Surgeon:  Dr. Lesa Courtney (OB/GYN Attending Staff)    Assistant(s):  HOMA WhatleyNP: Her assistance was required for retraction during entry to the abdomen, suture and instrument handling and fundal pressure for delivery of the baby.   Anesthesia:  Spinal   Quantitative blood loss:  181 mL    Drains:  Benson catheter   Specimens:   None   Findings:   Minimal abdominal wall adhesions. No intraabdominal adhesions. Clear fluid with amniotomy. Liveborn, vigorous male infant born vertex, BONITA. APGARS 7 and 9. Weight: 8lbs 5oz. Placenta appeared intact with a three vessel cord and no apparent gross pathology.  A true knot was noted in the umbilical cord.  Uterus appears normal and clear of any retained product. Normal bilateral fallopian tubes and ovaries. Hemostatic surgical site at the end of the case.    Complications:  None apparent    Condition:  Stable, transferred to PACU    Indication: Dalila Galvez is a 25 year old female  at 38w5d by LMP c/w 8w3d US who presents wth grossly ruptured fetal membranes. Hx of prior  section, planning repeat. I did review the risk of a repeat  delivery including bleeding, infection and intraabdominal and fetal injury. Informed consent was signed.    Procedure:  After obtaining informed consent, the patient was taken to the operating room. She received 2 grams of ancef prior to the skin incision. She was placed in the dorsal supine position with a leftward tilt and prepped and draped in the usual sterile fashion. Following test  of adequate spinal anesthesia, the abdomen was entered through a transverse skin incision through her previous scar. The skin incision was made sharply and carried through the subcutaneous tissue to the fascia.  Fascia was incised in the midline and extended laterally with the Price scissors. The superior margin of the fascial incision was grasped with Kocher clamps and dissected from the underlying muscle sharp and blunt dissecton, which was then repeated at the lower margin of the fascial incision.  The muscle was  in the midline. The peritoneum was entered bluntly and the opening extended by digital dissection with care to avoid the bladder.  An Dashawn O retractor was placed.  The lower segment of the uterus was opened sharply in a transverse fashion and extended with digital pressure. The infant's head was elevated to the level of the hysterotomy and was delivered atraumatically. The cord was doubly clamped and cut and the infant was handed off to the waiting nursing staff after 1 minute of delayed cord clamping. A segment of the cord was cut and set aside for cord gases if needed. The placenta was extracted via cord traction. The uterus was exteriorized from the abdomen and cleared of all clots and debris. Oxytocin was given through the running IV. With vigorous massage as well as administration of oxytocin, good uterine tone was achieved. The hysterotomy was repaired with 0-vicryl suture in a running locked fashion. A 2nd layer of 0-monocryl was  used to imbricate the incision and good hemostasis was achieved. The posterior cul-de-sac was suctioned and the uterus was returned to the abdomen. The bilateral pericolic gutters were irrigated. The hysterotomy was again inspected and found to have no active sites of bleeding. The abdominal wall was examined and found to have no active sites of bleeding. The fascia was closed with a running suture of 0-vicryl. Subcutaneous tissue was irrigated. Areas that  were oozing were controlled with cautery. The subcutaneous tissue was closed with plain gut. The skin was closed with 4-0 monocryl and Dermabond. The patient tolerated the procedure well and was taken to the recovery room in stable condition. All sponge, needle and instrument counts were correct x2.      Lesa Courtney MD   OB/GYN

## 2020-11-12 NOTE — ANESTHESIA CARE TRANSFER NOTE
Patient: Dalila Galvez    Procedure(s):  REPEAT  SECTION    Diagnosis: History of  [Z98.891]  Encounter for sterilization [Z30.2]  Prenatal care, subsequent pregnancy in third trimester [Z34.83]  Diagnosis Additional Information: No value filed.    Anesthesia Type:   Spinal, Peripheral Nerve Block     Note:  Airway :Room Air  Patient transferred to:Labor and Delivery  Handoff Report: Identifed the Patient, Identified the Reponsible Provider, Reviewed the pertinent medical history, Discussed the surgical course, Reviewed Intra-OP anesthesia mangement and issues during anesthesia, Set expectations for post-procedure period and Allowed opportunity for questions and acknowledgement of understanding      Vitals: (Last set prior to Anesthesia Care Transfer)    CRNA VITALS  2020             Pulse:  81    SpO2:  96 %                Electronically Signed By: AP Morelos CRNA  2020  9:25 PM

## 2020-11-12 NOTE — ANESTHESIA POSTPROCEDURE EVALUATION
Patient: Dalila Galvez    Procedure(s):  REPEAT  SECTION    Diagnosis:History of  [Z98.891]  Encounter for sterilization [Z30.2]  Prenatal care, subsequent pregnancy in third trimester [Z34.83]  Diagnosis Additional Information: No value filed.    Anesthesia Type:  Spinal, Peripheral Nerve Block    Note:  Anesthesia Post Evaluation    Patient location during evaluation: Bedside  Patient participation: Able to participate in evaluation but full recovery from regional anesthesia has not yet ocurrred but is anticipated to occur within 48 hours  Level of consciousness: awake and alert  Pain management: adequate  Airway patency: patent  Cardiovascular status: acceptable  Respiratory status: acceptable  Hydration status: acceptable  PONV: none     Anesthetic complications: None          Last vitals:  Vitals:    20 2315 20 2330 20 0245   BP: (!) 126/98 137/84 122/71   Pulse: 84 80    Resp:      Temp:   36.8  C (98.3  F)   SpO2: 97% 97%          Electronically Signed By: Will Godinez CRNA, APRN CRNA  2020  6:46 AM

## 2020-11-12 NOTE — TELEPHONE ENCOUNTER
delivery was last night - cancelled surgery for 20.  Dr. Lizbet jean baptiste    -Bety BURGOS [facility]  [facility] [place]

## 2020-11-12 NOTE — H&P
[facility] Labor and Delivery H&P  2020  Dalila Galvez  [id number]      HPI: Dalila Galvez is a 25 year old  at 38w5d by LMP c/w 8w3d US who presents with grossly rupture fetal membranes and irregular contractions.     +FM. No VB. Clear fluid.     Pregnancy notable for:  --hx of prior  section, planning repeat   --Rh neg  Fam hx of cardiac defect, s/p normal L2 and Fetal ECHO    OBHX:   OB History    Para Term  AB Living   2 1 1 0 0 1   SAB TAB Ectopic Multiple Live Births   0 0 0 0 1      # Outcome Date GA Lbr Benigno/2nd Weight Sex Delivery Anes PTL Lv   2 Current            1 Term 19 37w5d  3.289 kg (7 lb 4 oz) M CS-LTranv EPI N ANÍBAL      Birth Comments: NP in attendance at delivery for failure to progress and category 2 tracing with fetal tachycardia and minimal variability. ROM for >24hrs. Infant was placed on maternal abdomen for delayed cord clamping. Cried immediately. Brought to warmer and was dried/stimulated. Initially lung sounds coarse. Apgars 7/9. Lungs cleared quickly. At ~15 minutes of life infant became tachycardic to 200 (previously 160's) and began grunting intermittently. RR 80's. O2 sats within goal range throughout. CPAP done for ~15 minutes. Delee suctioned x2 for ~2mL thick clear secretions. HR improved to 150-180's. Continued to grunt intermittently. Brought back to special care nursery for further care.       Complications: Cephalopelvic Disproportion, Failure to Progress in First Stage, Cholestasis      Name: Elias      Apgar1: 7  Apgar5: 9       MedicalHX:   Past Medical History:   Diagnosis Date     Chickenpox        SurgicalHX:   Past Surgical History:   Procedure Laterality Date      SECTION N/A 2019    Procedure:  SECTION;  Surgeon: Francis Somers MD;  Location: [place] OR       Medications:   No current facility-administered medications on file prior to encounter.        cetirizine (ZYRTEC) 10 MG  tablet, Take 10 mg by mouth daily       diphenhydrAMINE (BENADRYL) 25 MG tablet, Take 25 mg by mouth every 6 hours as needed for itching or allergies       Prenatal Vit-Fe Fumarate-FA (PRENATAL MULTIVITAMIN PLUS IRON) 27-0.8 MG TABS per tablet, Take 1 tablet by mouth daily        Allergies:  No Known Allergies    FamilyHX:  Family History   Problem Relation Age of Onset     Heart Disease Maternal Grandfather      Coronary Artery Disease Paternal Grandfather         MI       SocialHX:   Social History     Socioeconomic History     Marital status:      Spouse name: Not on file     Number of children: 1     Years of education: Not on file     Highest education level: Not on file   Occupational History     Not on file   Social Needs     Financial resource strain: Not on file     Food insecurity     Worry: Not on file     Inability: Not on file     Transportation needs     Medical: Not on file     Non-medical: Not on file   Tobacco Use     Smoking status: Never Smoker     Smokeless tobacco: Never Used   Substance and Sexual Activity     Alcohol use: Yes     Comment: rare-quit with pregnancy     Drug use: No     Sexual activity: Yes     Partners: Male     Birth control/protection: None   Lifestyle     Physical activity     Days per week: Not on file     Minutes per session: Not on file     Stress: Not on file   Relationships     Social connections     Talks on phone: Not on file     Gets together: Not on file     Attends [employer] service: Not on file     Active member of club or organization: Not on file     Attends meetings of clubs or organizations: Not on file     Relationship status: Not on file     Intimate partner violence     Fear of current or ex partner: Not on file     Emotionally abused: Not on file     Physically abused: Not on file     Forced sexual activity: Not on file   Other Topics Concern     Parent/sibling w/ CABG, MI or angioplasty before 65F 55M? Not Asked   Social History Narrative     Not on  file       ROS: 10-point ROS negative except as in HPI    Physical Exam:  Vitals:    20 1915   BP: (!) 148/82   Pulse: 87   Resp: 16   Temp: 98.6  F (37  C)   TempSrc: Oral   Weight: 130.6 kg (288 lb)     GEN: resting comfortably in bed, NAD   CV: Reg rate, warm and well-perfused  PULM: no increased work of breathing, no cough/wheeze   ABD: soft, gravid, non-tender, non-distended  EXT: trace edema, non-tender to palpation  CVX: deferred  Presentation: cephalic by leopold's  EFW: 8.5 lbs  Membranes: grossly ruptured    Labs:   Lab Results   Component Value Date    ABO O 2020    RH Neg 2020    AS Neg 2020    HEPBANG Nonreactive 2020    CHPCRT Negative 2020    GCPCRT Negative 2020    HGB 13.2 2020       GBS Status:   Lab Results   Component Value Date    GBS Negative 10/22/2020       Lab Results   Component Value Date    PAP NIL 2018       A/P: Dalila Galvez is a 25 year old female  at 38w5d by LMP c/w 8w3d US who presents wth grossly ruptured fetal membranes. Hx of prior  section, planning repeat. I did review the risk of a repeat  delivery including bleeding, infection and intraabdominal and fetal injury. Informed consent was signed.     Admit to L&D. Place PIV. Draw labs: T&S, CBC, RPR.    FWB: Category 1 FHT.  Continue EFM and toco  PNC: Rh NEG - rhogam eval pp, Rubella immune    Lesa Courtney MD  OB/GYN

## 2020-11-12 NOTE — ANESTHESIA PROCEDURE NOTES
Procedure note : intrathecal      Staff -   CRNA: Elisa Eduardo APRN CRNA  Performed By: CRNA  Pre-Procedure    Location: OR    Procedure Times:11/11/2020 8:12 PM and 11/11/2020 8:17 PM  Pre-Anesthestic Checklist: patient identified, IV checked, site marked, risks and benefits discussed, informed consent, monitors and equipment checked, pre-op evaluation, at physician/surgeon's request and post-op pain management    Timeout  Correct Patient: Yes   Correct Procedure: Yes   Correct Site: Yes   Correct Laterality: Yes   Correct Position: Yes   Site Marked: Yes   .   Procedure Documentation  ASA 2 and Emergent  .    Procedure: intrathecal, .   Patient Position:sitting Insertion Site:L3-4  (midline approach)     Patient Prep/Sterile Barriers; mask, sterile gloves, povidone-iodine 7.5% surgical scrub, patient draped.  .  Needle:  Spinal Needle (gauge): 25  Spinal/LP Needle Length (inches): 3.5 # of attempts: 1 and # of redirects:  Introducer used .        Assessment/Narrative  Paresthesias: Yes.  .  .  clear CSF fluid removed . Time Injected: 20:16  Sensory Level: T8

## 2020-11-12 NOTE — PLAN OF CARE
Data: Vital signs within normal limits. Postpartum checks within normal limits - see flow record. Patient eating and drinking normally. Patient able to empty bladder independently. . Patient ambulating independently..   No apparent signs of infection. Incision healing well. Patient Is performing self cares and Is able to care for infant. Positive attachment behaviors are observed with infant. Support persons are present.  Action:  Pain plan was discussed. Patient would like pain meds to be brought in when they are due. Patient was medicated during the shift for pain and cramping. See MAR.Patient education done about breastfeeding, postpartum cares, and pain management/plan. See flow record.  Response:   Patient reassessed within 1 hour after each medication for pain. Patient stated that pain had improved. Patient stated that she was comfortable. .   Plan: Anticipate discharge on 11/14/20.

## 2020-11-12 NOTE — ANESTHESIA PROCEDURE NOTES
Procedure note : TAP      Staff -   CRNA: Elisa Eduardo APRN CRNA  Performed By: CRNA  Pre-Procedure    Location:   Procedure Times:11/11/2020 9:05 PM and 11/11/2020 9:20 PM  Pre-Anesthestic Checklist: patient identified, IV checked, site marked, risks and benefits discussed, informed consent, monitors and equipment checked, pre-op evaluation, at physician/surgeon's request and post-op pain management    Timeout  Correct Patient: Yes   Correct Procedure: Yes   Correct Site: Yes   Correct Laterality: Yes   Correct Position: Yes   Site Marked: Yes   .   Procedure Documentation    .    Procedure: TAP, bilateral.   Patient Position:sitting   Ultrasound used to identify targeted nerve, plexus, or vascular marker and placed a needle adjacent to it., Ultrasound was used to visualize the spread of the anesthetic in close proximity to the above stated nerve. A permanent image is entered into the patient's record.  Patient Prep/Sterile Barriers; mask, sterile gloves, chlorhexidine gluconate and isopropyl alcohol.  .  Needle: Touhy needle  Insertion Method: Single Shot.        Assessment/Narrative  Paresthesias: No.  .  The placement was negative for: blood aspirated, painful injection and site bleeding.  Bolus given via needle..   Secured via.   Complications:.

## 2020-11-13 VITALS
BODY MASS INDEX: 43.75 KG/M2 | TEMPERATURE: 97.7 F | HEART RATE: 106 BPM | SYSTOLIC BLOOD PRESSURE: 138 MMHG | RESPIRATION RATE: 16 BRPM | WEIGHT: 279.32 LBS | DIASTOLIC BLOOD PRESSURE: 72 MMHG | OXYGEN SATURATION: 98 %

## 2020-11-13 PROBLEM — Z34.83 PRENATAL CARE, SUBSEQUENT PREGNANCY IN THIRD TRIMESTER: Status: RESOLVED | Noted: 2020-06-16 | Resolved: 2020-11-13

## 2020-11-13 PROBLEM — Z98.891 S/P CESAREAN SECTION: Status: ACTIVE | Noted: 2020-11-13

## 2020-11-13 LAB — BLOOD BANK CMNT PATIENT-IMP: NORMAL

## 2020-11-13 PROCEDURE — 250N000013 HC RX MED GY IP 250 OP 250 PS 637: Performed by: OBSTETRICS & GYNECOLOGY

## 2020-11-13 PROCEDURE — 250N000011 HC RX IP 250 OP 636: Performed by: OBSTETRICS & GYNECOLOGY

## 2020-11-13 RX ORDER — DOCUSATE SODIUM 100 MG/1
100 CAPSULE, LIQUID FILLED ORAL 2 TIMES DAILY
Qty: 30 CAPSULE | Refills: 1 | Status: SHIPPED | OUTPATIENT
Start: 2020-11-13 | End: 2020-11-20

## 2020-11-13 RX ORDER — OXYCODONE HYDROCHLORIDE 5 MG/1
5 TABLET ORAL EVERY 4 HOURS PRN
Qty: 15 TABLET | Refills: 0 | Status: SHIPPED | OUTPATIENT
Start: 2020-11-13 | End: 2020-11-20

## 2020-11-13 RX ORDER — IBUPROFEN 800 MG/1
800 TABLET, FILM COATED ORAL EVERY 6 HOURS
Qty: 30 TABLET | Refills: 0 | Status: ON HOLD | OUTPATIENT
Start: 2020-11-13 | End: 2021-01-13

## 2020-11-13 RX ADMIN — DOCUSATE SODIUM AND SENNOSIDES 1 TABLET: 8.6; 5 TABLET ORAL at 07:34

## 2020-11-13 RX ADMIN — CETIRIZINE HYDROCHLORIDE 10 MG: 10 TABLET, FILM COATED ORAL at 07:35

## 2020-11-13 RX ADMIN — IBUPROFEN 800 MG: 800 TABLET ORAL at 06:23

## 2020-11-13 RX ADMIN — OXYCODONE HYDROCHLORIDE 10 MG: 5 TABLET ORAL at 02:22

## 2020-11-13 RX ADMIN — ACETAMINOPHEN 975 MG: 325 TABLET, FILM COATED ORAL at 02:22

## 2020-11-13 RX ADMIN — HUMAN RHO(D) IMMUNE GLOBULIN 300 MCG: 300 INJECTION, SOLUTION INTRAMUSCULAR at 10:42

## 2020-11-13 RX ADMIN — ACETAMINOPHEN 975 MG: 325 TABLET, FILM COATED ORAL at 07:34

## 2020-11-13 RX ADMIN — OXYCODONE HYDROCHLORIDE 10 MG: 5 TABLET ORAL at 10:57

## 2020-11-13 NOTE — PLAN OF CARE
Patient VSS. Postpartum check WDLs. Pain is well controlled with PRN Oxycodone 10 mg along with scheduled Tylenol, Ibuprofen and ice packs to site. Patient has requested to start pumping following breastfeeds to encourage milk production. At this time she is not able to get any milk out with hand expression. Breast feeding pump set up and education provided.

## 2020-11-13 NOTE — DISCHARGE INSTRUCTIONS
Postop  Birth Instructions    Please make follow up appointment with your provider in six weeks.   Activity       Do not lift more than 10 pounds for 6 weeks after surgery.  Ask family and friends for help when you need it.    No driving until you have stopped taking your pain medications (usually two weeks after surgery).    No heavy exercise or activity for 6 weeks.  Don't do anything that will put a strain on your surgery site.    Don't strain when using the toilet.  Your care team may prescribe a stool softener if you have problems with your bowel movements.     To care for your incision:       Keep the incision clean and dry.    Do not soak your incision in water. No swimming or hot tubs until it has fully healed. You may soak in the bathtub if the water level is below your incision.    Do not use peroxide, gel, cream, lotion, or ointment on your incision.    Adjust your clothes to avoid pressure on your surgery site (check the elastic in your underwear for example).     You may see a small amount of clear or pink drainage and this is normal.  Check with your health care provider:       If the drainage increases or has an odor.    If the incision reddens, you have swelling, or develop a rash.    If you have increased pain and the medicine we prescribed doesn't help.    If you have a fever above 100.4 F (38 C) with or without chills when placing thermometer under your tongue.   The area around your incision (surgery wound), will feel numb.  This is normal. The numbness should go away in less than a year.     Keep your hands clean:  Always wash your hands before touching your incision (surgery wound). This helps reduce your risk of infection. If your hands aren't dirty, you may use an alcohol hand-rub to clean your hands. Keep your nails clean and short.    Call your healthcare provider if you have any of these symptoms:       You soak a sanitary pad with blood within 1 hour, or you see blood clots larger  than a golf ball.    Bleeding that lasts more than 6 weeks.    Vaginal discharge that smells bad.    Severe pain, cramping or tenderness in your lower belly area.    A need to urinate more frequently (use the toilet more often), more urgently (use the toilet very quickly), or it burns when you urinate.    Nausea and vomiting.    Redness, swelling or pain around a vein in your leg.    Problems breastfeeding or a red or painful area on your breast.    Chest pain and cough or are gasping for air.    Problems with coping with sadness, anxiety or depression. If you have concerns about hurting yourself or the baby, call your provider immediately.      You have questions or concerns after you return home.   Please call [phone] if you have additional questions, or please call your OB/GYN provider.

## 2020-11-13 NOTE — PROGRESS NOTES
Discharge instructions reviewed with mother; states understanding.Office visit in 6 weeks. Encourage to call with questions or concerns. Discharged ambulatory to home at 1235pm.

## 2020-11-13 NOTE — ASSESSMENT & PLAN NOTE
POD #2 s/p RLTCS   Routine post partum/post operative care  Optimize pain management  Encourage ambulation  Lactation support  Patient doing well and desires discharge home today

## 2020-11-13 NOTE — PROGRESS NOTES
[facility] OB/GYN Department    Post-Partum Progress Note: POD #2    Name: Dalila Galvez  Date: 2020    Subjective:   Patient seen and examined.  No complaints today.  Pain well controlled.  Tolerating regular diet, without nausea or vomiting.  Ambulating without difficulty.  Benson removed post operatively, voiding spontaneously. + flatus, no BM yet. Breast feeding with bottle supplementation.     ROS:    General/Constitutional:  Denies change in appetite, chills, or fever  Respiratory: Denies shortness of breath, cough, wheezing   Cardiovascular: Denies chest pain, exertional pain, irregular heartbeat  Gastrointestinal:  +mild uterine cramping, no constipation, nausea, or vomiting, + flatus, no BM  Genitourinary: Denies hematuria, difficulty urinating, frequency, or dysuria   Musculoskeletal: Denies aching muscles or joints, no peripheral edema  Neurologic: Denies dizziness or blurry vision    Objective:   No intake or output data in the 24 hours ending 20 0957    Patient Vitals for the past 24 hrs:   BP Temp Temp src Pulse Resp SpO2   20 0736 138/72 97.7  F (36.5  C) Oral 106 16 98 %   20 0307 -- -- -- -- 16 --   20 2300 135/77 97.7  F (36.5  C) Oral 83 18 97 %   20 1523 (!) 141/83 97.8  F (36.6  C) Oral 80 18 --   20 1200 124/71 98.1  F (36.7  C) Oral 92 16 100 %       No results found.      General appearance: well-hydrated, A&O x 3, no apparent distress  ENT: EOMI, sclera anicteric   Lungs: Equal expansion bilaterally, no accessory muscle use  Heart: No heaves or thrills. No peripheral varicosities  Constitutional: See vitals  Abdomen: Soft, non-distended, no rebound or rigidity. Incision c/d/i, uterus firm below umbilicus with non-tender fundus   Neurologic: CN II-XII grossly intact, no lateralizing defects, no gross movement abnormalities  Extremities: no edema, no calf tenderness      Assessment and Plan:    S/P  section  POD #2 s/p RLTCS    Routine post partum/post operative care  Optimize pain management  Encourage ambulation  Lactation support  Patient doing well and desires discharge home today    Rh negative, antepartum  Rhogam prior to discharge      Janell Bustamante DO

## 2020-11-13 NOTE — DISCHARGE SUMMARY
[facility] Discharge Summary    Dalila Galvez MRN# [id number]   Age: 25 year old YOB: 1995     Date of Admission:  2020  Date of Discharge::  2020  Admitting Physician:  Lesa Courtney MD  Discharge Physician:  DO Gregorio Wilde clinic: [facility]          Admission Diagnoses:   History of  [Z98.891]  Encounter for sterilization [Z30.2]  Prenatal care, subsequent pregnancy in third trimester [Z34.83]            Discharge Diagnosis:   History of  [Z98.891]  Encounter for sterilization [Z30.2]  Prenatal care, subsequent pregnancy in third trimester [Z34.83]  S/p repeat C section           Procedures:   Procedure(s): Repeat low transverse  section       No procedures performed during this admission           Medications Prior to Admission:     Medications Prior to Admission   Medication Sig Dispense Refill Last Dose     cetirizine (ZYRTEC) 10 MG tablet Take 10 mg by mouth daily   2020 at 0800     diphenhydrAMINE (BENADRYL) 25 MG tablet Take 25 mg by mouth every 6 hours as needed for itching or allergies   Past Week at Unknown time     Prenatal Vit-Fe Fumarate-FA (PRENATAL MULTIVITAMIN PLUS IRON) 27-0.8 MG TABS per tablet Take 1 tablet by mouth daily   11/10/2020 at 2200     [DISCONTINUED] Magnesium Oxide 500 MG TABS    11/10/2020 at 2200             Discharge Medications:     Current Discharge Medication List      START taking these medications    Details   docusate sodium (COLACE) 100 MG capsule Take 1 capsule (100 mg) by mouth 2 times daily  Qty: 30 capsule, Refills: 1    Associated Diagnoses: S/P  section      ibuprofen (ADVIL/MOTRIN) 800 MG tablet Take 1 tablet (800 mg) by mouth every 6 hours  Qty: 30 tablet, Refills: 0    Associated Diagnoses: S/P  section      oxyCODONE (ROXICODONE) 5 MG tablet Take 1 tablet (5 mg) by mouth every 4 hours as needed for severe pain  Qty: 15 tablet, Refills: 0     Associated Diagnoses: S/P  section         CONTINUE these medications which have NOT CHANGED    Details   cetirizine (ZYRTEC) 10 MG tablet Take 10 mg by mouth daily      diphenhydrAMINE (BENADRYL) 25 MG tablet Take 25 mg by mouth every 6 hours as needed for itching or allergies      Prenatal Vit-Fe Fumarate-FA (PRENATAL MULTIVITAMIN PLUS IRON) 27-0.8 MG TABS per tablet Take 1 tablet by mouth daily         STOP taking these medications       Magnesium Oxide 500 MG TABS Comments:   Reason for Stopping:                     Consultations:   No consultations were requested during this admission          Brief History of Labor or Admission:   Patient presented at 38w5d gestation with ruptured membranes and history of prior C section. Decision made to proceed to the OR for repeat C section.            Hospital Course:   The patient's hospital course was unremarkable.  She recovered as anticipated and experienced no post-operative complications. On discharge, her pain was well controlled. Vaginal bleeding is similar to peak menstrual flow.  Voiding without difficulty.  Ambulating well and tolerating a normal diet.  No fever or significant wound drainage.  Breastfeeding well with bottle supplementation.  Infant is stable.  No bowel movement yet.  She was discharged on post-partum day #2.    Post-partum hemoglobin:   Hemoglobin   Date Value Ref Range Status   2020 11.7 11.7 - 15.7 g/dL Final             Discharge Instructions and Follow-Up:   Discharge diet: Regular   Discharge activity: Activity as tolerated   Discharge follow-up: Follow up with Dr. Fair in 1-2 weeks   Wound care: Drink plenty of fluids  Ice to area for comfort  Keep wound clean and dry           Discharge Disposition:   Discharged to home      Attestation:  I have reviewed today's vital signs, notes, medications, labs and imaging.    Janell Bustamante DO

## 2020-11-16 ENCOUNTER — HEALTH MAINTENANCE LETTER (OUTPATIENT)
Age: 25
End: 2020-11-16

## 2020-11-16 ENCOUNTER — ANESTHESIA (OUTPATIENT)
Dept: SURGERY | Facility: CLINIC | Age: 25
End: 2020-11-16
Payer: COMMERCIAL

## 2020-11-20 ENCOUNTER — TELEPHONE (OUTPATIENT)
Dept: OBGYN | Facility: CLINIC | Age: 25
End: 2020-11-20

## 2020-11-20 ENCOUNTER — PRENATAL OFFICE VISIT (OUTPATIENT)
Dept: OBGYN | Facility: CLINIC | Age: 25
End: 2020-11-20
Payer: COMMERCIAL

## 2020-11-20 VITALS
TEMPERATURE: 99.5 F | RESPIRATION RATE: 18 BRPM | WEIGHT: 265.6 LBS | SYSTOLIC BLOOD PRESSURE: 116 MMHG | HEIGHT: 67 IN | DIASTOLIC BLOOD PRESSURE: 77 MMHG | HEART RATE: 73 BPM | BODY MASS INDEX: 41.69 KG/M2

## 2020-11-20 DIAGNOSIS — N92.4 EXCESSIVE BLEEDING IN PREMENOPAUSAL PERIOD: Primary | ICD-10-CM

## 2020-11-20 DIAGNOSIS — N94.6 DYSMENORRHEA: ICD-10-CM

## 2020-11-20 PROBLEM — Z98.890 POSTOPERATIVE STATE: Status: RESOLVED | Noted: 2020-11-11 | Resolved: 2020-11-20

## 2020-11-20 PROBLEM — Z98.891 S/P CESAREAN SECTION: Status: RESOLVED | Noted: 2020-11-13 | Resolved: 2020-11-20

## 2020-11-20 PROBLEM — Z87.19 HISTORY OF CHOLESTASIS DURING PREGNANCY: Status: RESOLVED | Noted: 2019-04-03 | Resolved: 2020-11-20

## 2020-11-20 PROBLEM — Z87.59 HISTORY OF CHOLESTASIS DURING PREGNANCY: Status: RESOLVED | Noted: 2019-04-03 | Resolved: 2020-11-20

## 2020-11-20 PROBLEM — Z30.2 ENCOUNTER FOR STERILIZATION: Status: RESOLVED | Noted: 2020-06-16 | Resolved: 2020-11-20

## 2020-11-20 PROBLEM — Z98.891 HISTORY OF C-SECTION: Status: RESOLVED | Noted: 2020-06-16 | Resolved: 2020-11-20

## 2020-11-20 PROCEDURE — 99214 OFFICE O/P EST MOD 30 MIN: CPT | Mod: 24 | Performed by: OBSTETRICS & GYNECOLOGY

## 2020-11-20 RX ORDER — ACETAMINOPHEN 325 MG/1
975 TABLET ORAL ONCE
Status: CANCELLED | OUTPATIENT
Start: 2020-11-20 | End: 2020-11-20

## 2020-11-20 RX ORDER — CEFAZOLIN SODIUM IN 0.9 % NACL 3 G/100 ML
3 INTRAVENOUS SOLUTION, PIGGYBACK (ML) INTRAVENOUS
Status: CANCELLED | OUTPATIENT
Start: 2020-11-20

## 2020-11-20 RX ORDER — CEFAZOLIN SODIUM 1 G/50ML
1 INJECTION, SOLUTION INTRAVENOUS SEE ADMIN INSTRUCTIONS
Status: CANCELLED | OUTPATIENT
Start: 2020-11-20

## 2020-11-20 RX ORDER — PHENAZOPYRIDINE HYDROCHLORIDE 100 MG/1
200 TABLET, FILM COATED ORAL ONCE
Status: CANCELLED | OUTPATIENT
Start: 2020-11-20 | End: 2020-11-20

## 2020-11-20 ASSESSMENT — ANXIETY QUESTIONNAIRES
7. FEELING AFRAID AS IF SOMETHING AWFUL MIGHT HAPPEN: NOT AT ALL
3. WORRYING TOO MUCH ABOUT DIFFERENT THINGS: NOT AT ALL
2. NOT BEING ABLE TO STOP OR CONTROL WORRYING: NOT AT ALL
IF YOU CHECKED OFF ANY PROBLEMS ON THIS QUESTIONNAIRE, HOW DIFFICULT HAVE THESE PROBLEMS MADE IT FOR YOU TO DO YOUR WORK, TAKE CARE OF THINGS AT HOME, OR GET ALONG WITH OTHER PEOPLE: NOT DIFFICULT AT ALL
6. BECOMING EASILY ANNOYED OR IRRITABLE: NOT AT ALL
1. FEELING NERVOUS, ANXIOUS, OR ON EDGE: NOT AT ALL
5. BEING SO RESTLESS THAT IT IS HARD TO SIT STILL: NOT AT ALL
GAD7 TOTAL SCORE: 0

## 2020-11-20 ASSESSMENT — PATIENT HEALTH QUESTIONNAIRE - PHQ9
SUM OF ALL RESPONSES TO PHQ QUESTIONS 1-9: 0
5. POOR APPETITE OR OVEREATING: NOT AT ALL

## 2020-11-20 ASSESSMENT — MIFFLIN-ST. JEOR: SCORE: 1972.38

## 2020-11-20 NOTE — PROGRESS NOTES
"Dalila is a 25 year old  here for follow up of menorrhagia and dysmenorrhea.  She is currently 2 weeks postpartum and has already undergone tubal ligation at the time of her .  She is here to discuss definitive surgical therapy.  Normally, she has generally monthly cycles that last 7 days with at least 4-5 days of heaviness and 5-6 days of crampiness.  She has no desire for future childbearing.  Has been on oral contraceptive pills, patch and depo provera in the past.  Patch didn't work at all to manage her symptoms.  Pill helped some, but she still felt that her menses were bothersome.  She wants to remain off hormonal medication if possible.      Her paternal aunt has been diagnosed with ovary and uterine cancer in her 60's.  Maternal uncle with pancreas cancer.     Currently breastfeeding.     ROS: Ten point review of systems was reviewed and negative except the above.    PMH: Her past medical, surgical, and obstetric histories were reviewed and are documented in their appropriate chart areas.    ALL/Meds: Her medication and allergy histories were reviewed and are documented in their appropriate chart areas.    Social History     Tobacco Use     Smoking status: Never Smoker     Smokeless tobacco: Never Used   Substance Use Topics     Alcohol use: Yes     Comment: rare-quit with pregnancy     Drug use: No      FH: Her family history was reviewed and documented in its appropriate chart area.     PE: /77 (BP Location: Right arm, Patient Position: Chair, Cuff Size: Adult Large)   Pulse 73   Temp 99.5  F (37.5  C) (Tympanic)   Resp 18   Ht 1.702 m (5' 7\")   Wt 120.5 kg (265 lb 9.6 oz)   LMP 2020   Breastfeeding Yes   BMI 41.60 kg/m      General Appearance:  healthy, alert, active, no distress  HEENT: NCAT  Abdomen: Soft, nontender.  Normal bowel sounds.  No masses  Pelvic exam:  Deferred    A/P 25 year old  here for     ICD-10-CM    1. Excessive bleeding in premenopausal period  " N92.4 Case Request: total laparoscopic hysterectomy     Case Request: total laparoscopic hysterectomy   2. Dysmenorrhea  N94.6 Case Request: total laparoscopic hysterectomy     Case Request: total laparoscopic hysterectomy        1. Discussed options of resuming hormonal medication versus pursuing hysterectomy (without bilateral salpingo-oophorectomy given her age).  Patient is certain that she would like to proceed with hysterectomy (I encouraged her to double check with insurance).  Given her prior surgeries, I recommended laparoscopic approach and that it be performed when she is at least a full 6 weeks postpartum.  Patient understood risks and benefits including risks of bleeding, infection and damage to surrounding structures.  The patient wants to proceed and will schedule for 12/31/2020    Maida Fair M.D.

## 2020-11-20 NOTE — NURSING NOTE
"Initial /77 (BP Location: Right arm, Patient Position: Chair, Cuff Size: Adult Large)   Pulse 73   Temp 99.5  F (37.5  C) (Tympanic)   Resp 18   Ht 1.702 m (5' 7\")   Wt 120.5 kg (265 lb 9.6 oz)   LMP 02/14/2020   Breastfeeding Yes   BMI 41.60 kg/m   Estimated body mass index is 41.6 kg/m  as calculated from the following:    Height as of this encounter: 1.702 m (5' 7\").    Weight as of this encounter: 120.5 kg (265 lb 9.6 oz). .    Sommer Saini, DERRICK    "

## 2020-11-20 NOTE — TELEPHONE ENCOUNTER
"  You are now scheduled for surgery at [facility].  Below are the details for your surgery.  Please read the \"Preparing for Your Surgery\" instructions and let us know if you have any questions.    Type of surgery: total laparoscopic hysterectomy  Surgeon:  Maida Fair MD  Location of surgery: [facility] OR    Date of surgery: 12-31-20    Time: 9:45am   Arrival Time: 8:15am    Time can change, to be confirmed a couple of days prior by pre-op surgery nurse.    Pre-Op Appt Date: Patient to schedule with a PCP or Family Practice Provider within 30 days to the surgery.  Post-Op Appt Date: To be determined by provider     COVID test 2-4 days prior at [facility]  Date 12-28-20  Time 10:00am    Packet sent out: Yes  Pre-cert/Authorization completed:  TBD by Financial Securing Office.   MA Sterilization/Hysterectomy Acknowledgment Consent signed: Not Applicable    [facility]  [phone]    Fax: [phone]  Same Day Surgery [phone]  Fax: [phone]  [facility] [phone]    "

## 2020-11-21 ASSESSMENT — ANXIETY QUESTIONNAIRES: GAD7 TOTAL SCORE: 0

## 2020-11-29 DIAGNOSIS — Z11.59 ENCOUNTER FOR SCREENING FOR OTHER VIRAL DISEASES: Primary | ICD-10-CM

## 2020-12-23 ENCOUNTER — ANESTHESIA EVENT (OUTPATIENT)
Dept: SURGERY | Facility: CLINIC | Age: 25
End: 2020-12-23
Payer: COMMERCIAL

## 2020-12-28 ENCOUNTER — OFFICE VISIT (OUTPATIENT)
Dept: FAMILY MEDICINE | Facility: CLINIC | Age: 25
End: 2020-12-28
Payer: COMMERCIAL

## 2020-12-28 VITALS
SYSTOLIC BLOOD PRESSURE: 116 MMHG | HEIGHT: 67 IN | BODY MASS INDEX: 40.18 KG/M2 | TEMPERATURE: 98.3 F | DIASTOLIC BLOOD PRESSURE: 70 MMHG | HEART RATE: 80 BPM | WEIGHT: 256 LBS

## 2020-12-28 DIAGNOSIS — Z11.59 ENCOUNTER FOR SCREENING FOR OTHER VIRAL DISEASES: ICD-10-CM

## 2020-12-28 DIAGNOSIS — Z01.818 PREOP GENERAL PHYSICAL EXAM: Primary | ICD-10-CM

## 2020-12-28 DIAGNOSIS — N92.4 EXCESSIVE BLEEDING IN PREMENOPAUSAL PERIOD: ICD-10-CM

## 2020-12-28 DIAGNOSIS — N94.6 DYSMENORRHEA: ICD-10-CM

## 2020-12-28 LAB
ERYTHROCYTE [DISTWIDTH] IN BLOOD BY AUTOMATED COUNT: 13.5 % (ref 10–15)
HCT VFR BLD AUTO: 44 % (ref 35–47)
HGB BLD-MCNC: 13.7 G/DL (ref 11.7–15.7)
MCH RBC QN AUTO: 28.9 PG (ref 26.5–33)
MCHC RBC AUTO-ENTMCNC: 31.1 G/DL (ref 31.5–36.5)
MCV RBC AUTO: 93 FL (ref 78–100)
PLATELET # BLD AUTO: 310 10E9/L (ref 150–450)
RBC # BLD AUTO: 4.74 10E12/L (ref 3.8–5.2)
SARS-COV-2 RNA SPEC QL NAA+PROBE: NORMAL
SPECIMEN SOURCE: NORMAL
WBC # BLD AUTO: 9 10E9/L (ref 4–11)

## 2020-12-28 PROCEDURE — 85027 COMPLETE CBC AUTOMATED: CPT | Performed by: NURSE PRACTITIONER

## 2020-12-28 PROCEDURE — 99203 OFFICE O/P NEW LOW 30 MIN: CPT | Performed by: NURSE PRACTITIONER

## 2020-12-28 PROCEDURE — U0003 INFECTIOUS AGENT DETECTION BY NUCLEIC ACID (DNA OR RNA); SEVERE ACUTE RESPIRATORY SYNDROME CORONAVIRUS 2 (SARS-COV-2) (CORONAVIRUS DISEASE [COVID-19]), AMPLIFIED PROBE TECHNIQUE, MAKING USE OF HIGH THROUGHPUT TECHNOLOGIES AS DESCRIBED BY CMS-2020-01-R: HCPCS | Performed by: OBSTETRICS & GYNECOLOGY

## 2020-12-28 PROCEDURE — 36415 COLL VENOUS BLD VENIPUNCTURE: CPT | Performed by: NURSE PRACTITIONER

## 2020-12-28 ASSESSMENT — MIFFLIN-ST. JEOR: SCORE: 1928.84

## 2020-12-28 NOTE — PATIENT INSTRUCTIONS

## 2020-12-28 NOTE — PROGRESS NOTES
[facility]  [address] [place] [id number]  Phone: [phone]  Fax: [phone]  Primary Provider: No Ref-Primary, Physician  Pre-op Performing Provider: ROBERT DIAS    PREOPERATIVE EVALUATION:  Today's date: 12/28/2020    Dalila Galvez is a 25 year old female who presents for a preoperative evaluation.    Surgical Information:  Surgery/Procedure: total laparoscopic hysterectomy  Surgery Location: [facility]  Surgeon: Dr. Fair  Surgery Date: 12/31/20  Time of Surgery: TBD  Where patient plans to recover: At home with family  Fax number for surgical facility: Note does not need to be faxed, will be available electronically in Epic.    Type of Anesthesia Anticipated: General    Subjective     HPI related to upcoming procedure: Significantly long history of heavy menstrual bleeding and dysmenorrhea. Has trialed multiple birth control options to help control without improvement.     Preop Questions 12/28/2020   1. Have you ever had a heart attack or stroke? No   2. Have you ever had surgery on your heart or blood vessels, such as a stent placement, a coronary artery bypass, or surgery on an artery in your head, neck, heart, or legs? No   3. Do you have chest pain with activity? No   4. Do you have a history of  heart failure? No   5. Do you currently have a cold, bronchitis or symptoms of other infection? No   6. Do you have a cough, shortness of breath, or wheezing? No   7. Do you or anyone in your family have previous history of blood clots? No   8. Do you or does anyone in your family have a serious bleeding problem such as prolonged bleeding following surgeries or cuts? No   9. Have you ever had problems with anemia or been told to take iron pills? No   10. Have you had any abnormal blood loss such as black, tarry or bloody stools, or abnormal vaginal bleeding? No   11. Have you ever had a blood transfusion? No   12. Are you  willing to have a blood transfusion if it is medically needed before, during, or after your surgery? Yes   13. Have you or any of your relatives ever had problems with anesthesia? No   14. Do you have sleep apnea, excessive snoring or daytime drowsiness? No   15. Do you have any artifical heart valves or other implanted medical devices like a pacemaker, defibrillator, or continuous glucose monitor? No   16. Do you have artificial joints? No   17. Are you allergic to latex? No   18. Is there any chance that you may be pregnant? No       Health Care Directive:  Patient does not have a Health Care Directive or Living Will: Discussed advance care planning with patient; however, patient declined at this time.    Preoperative Review of :   reviewed - no record of controlled substances prescribed.        Review of Systems  Constitutional, neuro, ENT, endocrine, pulmonary, cardiac, gastrointestinal, genitourinary, musculoskeletal, integument and psychiatric systems are negative, except as otherwise noted.    Patient Active Problem List    Diagnosis Date Noted     Excessive bleeding in premenopausal period 2020     Priority: Medium     Added automatically from request for surgery [id number]       Dysmenorrhea 2020     Priority: Medium     Added automatically from request for surgery [id number]       Prenatal care, subsequent pregnancy 2020     Priority: Medium     Cyst of subcutaneous tissue 2019     Priority: Medium     H/O:  2019     Priority: Medium     Rh negative, antepartum 2018     Priority: Medium     BMI 39.0-39.9,adult 2018     Priority: Medium      Past Medical History:   Diagnosis Date     Chickenpox      Past Surgical History:   Procedure Laterality Date      SECTION N/A 2019    Procedure:  SECTION;  Surgeon: Francis Somers MD;  Location: [facility] OR      SECTION, TUBAL LIGATION, COMBINED Bilateral 2020    Procedure: REPEAT  " SECTION;  Surgeon: Lesa Courtney MD;  Location: [facility] OR     Current Outpatient Medications   Medication Sig Dispense Refill     cetirizine (ZYRTEC) 10 MG tablet Take 10 mg by mouth daily       ibuprofen (ADVIL/MOTRIN) 800 MG tablet Take 1 tablet (800 mg) by mouth every 6 hours 30 tablet 0     Prenatal Vit-Fe Fumarate-FA (PRENATAL MULTIVITAMIN PLUS IRON) 27-0.8 MG TABS per tablet Take 1 tablet by mouth daily         No Known Allergies     Social History     Tobacco Use     Smoking status: Never Smoker     Smokeless tobacco: Never Used   Substance Use Topics     Alcohol use: Yes     Comment: rare-quit with pregnancy     Family History   Problem Relation Age of Onset     Heart Disease Maternal Grandfather      Coronary Artery Disease Paternal Grandfather         MI     Cancer Paternal Aunt         uterine and ovary     History   Drug Use No         Objective     /70 (BP Location: Right arm, Cuff Size: Adult Large)   Pulse 80   Temp 98.3  F (36.8  C) (Tympanic)   Ht 1.702 m (5' 7\")   Wt 116.1 kg (256 lb)   LMP 2020   BMI 40.10 kg/m      Physical Exam    GENERAL APPEARANCE: healthy, alert and no distress     HENT: ear canals and TM's normal and nose and mouth without ulcers or lesions     NECK: no adenopathy, no asymmetry, masses, or scars and thyroid normal to palpation     RESP: lungs clear to auscultation - no rales, rhonchi or wheezes     CV: regular rates and rhythm, normal S1 S2, no S3 or S4 and no murmur, click or rub     ABDOMEN:  soft, nontender, no HSM or masses and bowel sounds normal     MS: extremities normal- no gross deformities noted, no evidence of inflammation in joints, FROM in all extremities.     SKIN: no suspicious lesions or rashes     NEURO: Normal strength and tone, sensory exam grossly normal, mentation intact and speech normal     PSYCH: mentation appears normal. and affect normal/bright     LYMPHATICS: No cervical adenopathy    Recent Labs   Lab Test " 11/12/20  0609 11/11/20  1944 10/22/20  1606 09/02/20  1030 02/18/19  1551 02/18/19  1551   HGB 11.7 13.2  13.2  --  12.7   < > 11.8   PLT  --  247  --  260   < > 278   NA  --   --  141  --   --  139   POTASSIUM  --   --  3.5  --   --  3.7   CR  --  0.68 0.61  --   --  0.54    < > = values in this interval not displayed.        Diagnostics:  Labs pending at this time.  Results will be reviewed when available.   No EKG required for low risk surgery (cataract, skin procedure, breast biopsy, etc).    Revised Cardiac Risk Index (RCRI):  The patient has the following serious cardiovascular risks for perioperative complications:   - No serious cardiac risks = 0 points     RCRI Interpretation: 0 points: Class I (very low risk - 0.4% complication rate)           Assessment & Plan   The proposed surgical procedure is considered INTERMEDIATE risk.    1. Preop general physical exam  Healthy 25 y.o. with no acute concerns. Normal pre-op exam.     2. Excessive bleeding in premenopausal period  Chronic.   - CBC with platelets    3. Dysmenorrhea  Chronic.   - CBC with platelets        Risks and Recommendations:  The patient has the following additional risks and recommendations for perioperative complications:   - No identified additional risk factors other than previously addressed    Medication Instructions:   - ibuprofen (Advil, Motrin): HOLD 1 day before surgery.     RECOMMENDATION:  APPROVAL GIVEN to proceed with proposed procedure, without further diagnostic evaluation.    Signed Electronically by: AP Lange CNP    Copy of this evaluation report is provided to requesting physician.    [facility]    Revised Cardiac Risk Index

## 2020-12-29 LAB
LABORATORY COMMENT REPORT: ABNORMAL
SARS-COV-2 RNA SPEC QL NAA+PROBE: POSITIVE
SPECIMEN SOURCE: ABNORMAL

## 2020-12-31 ENCOUNTER — ANESTHESIA (OUTPATIENT)
Dept: SURGERY | Facility: CLINIC | Age: 25
End: 2020-12-31
Payer: COMMERCIAL

## 2021-01-01 ENCOUNTER — MYC MEDICAL ADVICE (OUTPATIENT)
Dept: OBGYN | Facility: CLINIC | Age: 26
End: 2021-01-01

## 2021-01-13 ENCOUNTER — HOSPITAL ENCOUNTER (OUTPATIENT)
Facility: CLINIC | Age: 26
Discharge: HOME | End: 2021-01-13
Attending: OBSTETRICS & GYNECOLOGY | Admitting: OBSTETRICS & GYNECOLOGY
Payer: COMMERCIAL

## 2021-01-13 VITALS
RESPIRATION RATE: 18 BRPM | HEART RATE: 82 BPM | DIASTOLIC BLOOD PRESSURE: 67 MMHG | SYSTOLIC BLOOD PRESSURE: 117 MMHG | WEIGHT: 256 LBS | BODY MASS INDEX: 40.18 KG/M2 | OXYGEN SATURATION: 98 % | TEMPERATURE: 97.8 F | HEIGHT: 67 IN

## 2021-01-13 DIAGNOSIS — N94.6 DYSMENORRHEA: ICD-10-CM

## 2021-01-13 DIAGNOSIS — N92.4 EXCESSIVE BLEEDING IN PREMENOPAUSAL PERIOD: ICD-10-CM

## 2021-01-13 PROBLEM — O26.899 RH NEGATIVE, ANTEPARTUM: Status: RESOLVED | Noted: 2018-09-06 | Resolved: 2021-01-13

## 2021-01-13 PROBLEM — Z98.891 H/O: C-SECTION: Status: RESOLVED | Noted: 2019-02-21 | Resolved: 2021-01-13

## 2021-01-13 PROBLEM — Z67.91 RH NEGATIVE, ANTEPARTUM: Status: RESOLVED | Noted: 2018-09-06 | Resolved: 2021-01-13

## 2021-01-13 PROBLEM — Z34.80 PRENATAL CARE, SUBSEQUENT PREGNANCY: Status: RESOLVED | Noted: 2020-03-18 | Resolved: 2021-01-13

## 2021-01-13 LAB
ABO + RH BLD: NORMAL
ABO + RH BLD: NORMAL
BLD GP AB SCN SERPL QL: NORMAL
BLOOD BANK CMNT PATIENT-IMP: NORMAL
CREAT SERPL-MCNC: 0.87 MG/DL (ref 0.52–1.04)
GFR SERPL CREATININE-BSD FRML MDRD: >90 ML/MIN/{1.73_M2}
HCG UR QL: NEGATIVE
HGB BLD-MCNC: 14.4 G/DL (ref 11.7–15.7)
SPECIMEN EXP DATE BLD: NORMAL

## 2021-01-13 PROCEDURE — 88307 TISSUE EXAM BY PATHOLOGIST: CPT | Mod: 26 | Performed by: PATHOLOGY

## 2021-01-13 PROCEDURE — 36415 COLL VENOUS BLD VENIPUNCTURE: CPT | Performed by: OBSTETRICS & GYNECOLOGY

## 2021-01-13 PROCEDURE — 250N000013 HC RX MED GY IP 250 OP 250 PS 637: Performed by: OBSTETRICS & GYNECOLOGY

## 2021-01-13 PROCEDURE — 88307 TISSUE EXAM BY PATHOLOGIST: CPT | Mod: TC | Performed by: OBSTETRICS & GYNECOLOGY

## 2021-01-13 PROCEDURE — 258N000003 HC RX IP 258 OP 636: Performed by: NURSE ANESTHETIST, CERTIFIED REGISTERED

## 2021-01-13 PROCEDURE — 86901 BLOOD TYPING SEROLOGIC RH(D): CPT | Performed by: OBSTETRICS & GYNECOLOGY

## 2021-01-13 PROCEDURE — 370N000017 HC ANESTHESIA TECHNICAL FEE, PER MIN: Performed by: OBSTETRICS & GYNECOLOGY

## 2021-01-13 PROCEDURE — 250N000011 HC RX IP 250 OP 636: Performed by: OBSTETRICS & GYNECOLOGY

## 2021-01-13 PROCEDURE — 271N000001 HC OR GENERAL SUPPLY NON-STERILE: Performed by: OBSTETRICS & GYNECOLOGY

## 2021-01-13 PROCEDURE — 250N000009 HC RX 250: Performed by: NURSE ANESTHETIST, CERTIFIED REGISTERED

## 2021-01-13 PROCEDURE — 85018 HEMOGLOBIN: CPT | Performed by: OBSTETRICS & GYNECOLOGY

## 2021-01-13 PROCEDURE — 86900 BLOOD TYPING SEROLOGIC ABO: CPT | Performed by: OBSTETRICS & GYNECOLOGY

## 2021-01-13 PROCEDURE — 710N000009 HC RECOVERY PHASE 1, LEVEL 1, PER MIN: Performed by: OBSTETRICS & GYNECOLOGY

## 2021-01-13 PROCEDURE — 999N000141 HC STATISTIC PRE-PROCEDURE NURSING ASSESSMENT: Performed by: OBSTETRICS & GYNECOLOGY

## 2021-01-13 PROCEDURE — 58571 TLH W/T/O 250 G OR LESS: CPT | Mod: 80 | Performed by: OBSTETRICS & GYNECOLOGY

## 2021-01-13 PROCEDURE — 710N000012 HC RECOVERY PHASE 2, PER MINUTE: Performed by: OBSTETRICS & GYNECOLOGY

## 2021-01-13 PROCEDURE — 81025 URINE PREGNANCY TEST: CPT | Performed by: OBSTETRICS & GYNECOLOGY

## 2021-01-13 PROCEDURE — 58571 TLH W/T/O 250 G OR LESS: CPT | Performed by: OBSTETRICS & GYNECOLOGY

## 2021-01-13 PROCEDURE — 272N000001 HC OR GENERAL SUPPLY STERILE: Performed by: OBSTETRICS & GYNECOLOGY

## 2021-01-13 PROCEDURE — 272N000004 HC RX 272: Performed by: OBSTETRICS & GYNECOLOGY

## 2021-01-13 PROCEDURE — 82565 ASSAY OF CREATININE: CPT | Performed by: OBSTETRICS & GYNECOLOGY

## 2021-01-13 PROCEDURE — 250N000025 HC SEVOFLURANE, PER MIN: Performed by: OBSTETRICS & GYNECOLOGY

## 2021-01-13 PROCEDURE — 250N000011 HC RX IP 250 OP 636: Performed by: NURSE ANESTHETIST, CERTIFIED REGISTERED

## 2021-01-13 PROCEDURE — 360N000077 HC SURGERY LEVEL 4, PER MIN: Performed by: OBSTETRICS & GYNECOLOGY

## 2021-01-13 PROCEDURE — 86850 RBC ANTIBODY SCREEN: CPT | Performed by: OBSTETRICS & GYNECOLOGY

## 2021-01-13 RX ORDER — IBUPROFEN 600 MG/1
600 TABLET, FILM COATED ORAL
Status: DISCONTINUED | OUTPATIENT
Start: 2021-01-13 | End: 2021-01-13

## 2021-01-13 RX ORDER — ONDANSETRON 2 MG/ML
INJECTION INTRAMUSCULAR; INTRAVENOUS PRN
Status: DISCONTINUED | OUTPATIENT
Start: 2021-01-13 | End: 2021-01-13

## 2021-01-13 RX ORDER — MEPERIDINE HYDROCHLORIDE 50 MG/ML
INJECTION INTRAMUSCULAR; INTRAVENOUS; SUBCUTANEOUS PRN
Status: DISCONTINUED | OUTPATIENT
Start: 2021-01-13 | End: 2021-01-13

## 2021-01-13 RX ORDER — CEFAZOLIN SODIUM 2 G/100ML
2 INJECTION, SOLUTION INTRAVENOUS
Status: COMPLETED | OUTPATIENT
Start: 2021-01-13 | End: 2021-01-13

## 2021-01-13 RX ORDER — OXYCODONE HYDROCHLORIDE 5 MG/1
5-10 TABLET ORAL
Qty: 20 TABLET | Refills: 0 | Status: SHIPPED | OUTPATIENT
Start: 2021-01-13 | End: 2021-02-26

## 2021-01-13 RX ORDER — ONDANSETRON 4 MG/1
4 TABLET, ORALLY DISINTEGRATING ORAL
Status: DISCONTINUED | OUTPATIENT
Start: 2021-01-13 | End: 2021-01-13

## 2021-01-13 RX ORDER — HYDROXYZINE HYDROCHLORIDE 25 MG/1
25 TABLET, FILM COATED ORAL
Status: COMPLETED | OUTPATIENT
Start: 2021-01-13 | End: 2021-01-13

## 2021-01-13 RX ORDER — DEXAMETHASONE SODIUM PHOSPHATE 4 MG/ML
INJECTION, SOLUTION INTRA-ARTICULAR; INTRALESIONAL; INTRAMUSCULAR; INTRAVENOUS; SOFT TISSUE PRN
Status: DISCONTINUED | OUTPATIENT
Start: 2021-01-13 | End: 2021-01-13

## 2021-01-13 RX ORDER — PHENYLEPHRINE HYDROCHLORIDE 10 MG/ML
INJECTION INTRAVENOUS PRN
Status: DISCONTINUED | OUTPATIENT
Start: 2021-01-13 | End: 2021-01-13

## 2021-01-13 RX ORDER — ONDANSETRON 2 MG/ML
4 INJECTION INTRAMUSCULAR; INTRAVENOUS EVERY 30 MIN PRN
Status: DISCONTINUED | OUTPATIENT
Start: 2021-01-13 | End: 2021-01-13

## 2021-01-13 RX ORDER — CEFAZOLIN SODIUM 1 G/50ML
1 INJECTION, SOLUTION INTRAVENOUS SEE ADMIN INSTRUCTIONS
Status: DISCONTINUED | OUTPATIENT
Start: 2021-01-13 | End: 2021-01-13 | Stop reason: HOSPADM

## 2021-01-13 RX ORDER — FENTANYL CITRATE 50 UG/ML
25-50 INJECTION, SOLUTION INTRAMUSCULAR; INTRAVENOUS
Status: DISCONTINUED | OUTPATIENT
Start: 2021-01-13 | End: 2021-01-13 | Stop reason: HOSPADM

## 2021-01-13 RX ORDER — AMOXICILLIN 250 MG
1-2 CAPSULE ORAL 2 TIMES DAILY
Qty: 30 TABLET | Refills: 0 | Status: SHIPPED | OUTPATIENT
Start: 2021-01-13 | End: 2022-02-14

## 2021-01-13 RX ORDER — ACETAMINOPHEN 325 MG/1
975 TABLET ORAL ONCE
Status: COMPLETED | OUTPATIENT
Start: 2021-01-13 | End: 2021-01-13

## 2021-01-13 RX ORDER — DIMENHYDRINATE 50 MG/ML
INJECTION, SOLUTION INTRAMUSCULAR; INTRAVENOUS PRN
Status: DISCONTINUED | OUTPATIENT
Start: 2021-01-13 | End: 2021-01-13

## 2021-01-13 RX ORDER — HYDROMORPHONE HYDROCHLORIDE 1 MG/ML
0.2 INJECTION, SOLUTION INTRAMUSCULAR; INTRAVENOUS; SUBCUTANEOUS EVERY 30 MIN PRN
Status: DISCONTINUED | OUTPATIENT
Start: 2021-01-13 | End: 2021-01-13 | Stop reason: HOSPADM

## 2021-01-13 RX ORDER — LIDOCAINE HYDROCHLORIDE 10 MG/ML
INJECTION, SOLUTION INFILTRATION; PERINEURAL PRN
Status: DISCONTINUED | OUTPATIENT
Start: 2021-01-13 | End: 2021-01-13

## 2021-01-13 RX ORDER — ONDANSETRON 2 MG/ML
4 INJECTION INTRAMUSCULAR; INTRAVENOUS EVERY 6 HOURS PRN
Status: DISCONTINUED | OUTPATIENT
Start: 2021-01-13 | End: 2021-01-13 | Stop reason: HOSPADM

## 2021-01-13 RX ORDER — ONDANSETRON 4 MG/1
4 TABLET, ORALLY DISINTEGRATING ORAL EVERY 30 MIN PRN
Status: DISCONTINUED | OUTPATIENT
Start: 2021-01-13 | End: 2021-01-13

## 2021-01-13 RX ORDER — SODIUM CHLORIDE, SODIUM LACTATE, POTASSIUM CHLORIDE, CALCIUM CHLORIDE 600; 310; 30; 20 MG/100ML; MG/100ML; MG/100ML; MG/100ML
INJECTION, SOLUTION INTRAVENOUS CONTINUOUS
Status: DISCONTINUED | OUTPATIENT
Start: 2021-01-13 | End: 2021-01-13 | Stop reason: HOSPADM

## 2021-01-13 RX ORDER — HYDROXYZINE HYDROCHLORIDE 50 MG/1
50 TABLET, FILM COATED ORAL EVERY 6 HOURS PRN
Status: DISCONTINUED | OUTPATIENT
Start: 2021-01-13 | End: 2021-01-13

## 2021-01-13 RX ORDER — KETOROLAC TROMETHAMINE 30 MG/ML
30 INJECTION, SOLUTION INTRAMUSCULAR; INTRAVENOUS
Status: DISCONTINUED | OUTPATIENT
Start: 2021-01-13 | End: 2021-01-13 | Stop reason: HOSPADM

## 2021-01-13 RX ORDER — CEFAZOLIN SODIUM IN 0.9 % NACL 3 G/100 ML
3 INTRAVENOUS SOLUTION, PIGGYBACK (ML) INTRAVENOUS
Status: DISCONTINUED | OUTPATIENT
Start: 2021-01-13 | End: 2021-01-13 | Stop reason: DRUGHIGH

## 2021-01-13 RX ORDER — FENTANYL CITRATE 50 UG/ML
25-50 INJECTION, SOLUTION INTRAMUSCULAR; INTRAVENOUS EVERY 5 MIN PRN
Status: DISCONTINUED | OUTPATIENT
Start: 2021-01-13 | End: 2021-01-13 | Stop reason: HOSPADM

## 2021-01-13 RX ORDER — NALOXONE HYDROCHLORIDE 0.4 MG/ML
0.2 INJECTION, SOLUTION INTRAMUSCULAR; INTRAVENOUS; SUBCUTANEOUS
Status: DISCONTINUED | OUTPATIENT
Start: 2021-01-13 | End: 2021-01-13 | Stop reason: HOSPADM

## 2021-01-13 RX ORDER — KETOROLAC TROMETHAMINE 30 MG/ML
INJECTION, SOLUTION INTRAMUSCULAR; INTRAVENOUS PRN
Status: DISCONTINUED | OUTPATIENT
Start: 2021-01-13 | End: 2021-01-13

## 2021-01-13 RX ORDER — GLYCOPYRROLATE 0.2 MG/ML
INJECTION, SOLUTION INTRAMUSCULAR; INTRAVENOUS PRN
Status: DISCONTINUED | OUTPATIENT
Start: 2021-01-13 | End: 2021-01-13

## 2021-01-13 RX ORDER — MEPERIDINE HYDROCHLORIDE 25 MG/ML
12.5 INJECTION INTRAMUSCULAR; INTRAVENOUS; SUBCUTANEOUS
Status: DISCONTINUED | OUTPATIENT
Start: 2021-01-13 | End: 2021-01-13 | Stop reason: HOSPADM

## 2021-01-13 RX ORDER — IBUPROFEN 600 MG/1
600 TABLET, FILM COATED ORAL EVERY 6 HOURS PRN
Status: DISCONTINUED | OUTPATIENT
Start: 2021-01-13 | End: 2021-01-13 | Stop reason: HOSPADM

## 2021-01-13 RX ORDER — HYDROXYZINE HYDROCHLORIDE 25 MG/1
25 TABLET, FILM COATED ORAL EVERY 6 HOURS PRN
Status: DISCONTINUED | OUTPATIENT
Start: 2021-01-13 | End: 2021-01-13

## 2021-01-13 RX ORDER — PROPOFOL 10 MG/ML
INJECTION, EMULSION INTRAVENOUS PRN
Status: DISCONTINUED | OUTPATIENT
Start: 2021-01-13 | End: 2021-01-13

## 2021-01-13 RX ORDER — NALOXONE HYDROCHLORIDE 0.4 MG/ML
0.4 INJECTION, SOLUTION INTRAMUSCULAR; INTRAVENOUS; SUBCUTANEOUS
Status: DISCONTINUED | OUTPATIENT
Start: 2021-01-13 | End: 2021-01-13 | Stop reason: HOSPADM

## 2021-01-13 RX ORDER — PROPOFOL 10 MG/ML
INJECTION, EMULSION INTRAVENOUS CONTINUOUS PRN
Status: DISCONTINUED | OUTPATIENT
Start: 2021-01-13 | End: 2021-01-13

## 2021-01-13 RX ORDER — ACETAMINOPHEN 325 MG/1
650 TABLET ORAL EVERY 4 HOURS PRN
Qty: 100 TABLET | Refills: 0 | Status: SHIPPED | OUTPATIENT
Start: 2021-01-13 | End: 2021-02-26

## 2021-01-13 RX ORDER — LIDOCAINE 40 MG/G
CREAM TOPICAL
Status: DISCONTINUED | OUTPATIENT
Start: 2021-01-13 | End: 2021-01-13 | Stop reason: HOSPADM

## 2021-01-13 RX ORDER — OXYCODONE HYDROCHLORIDE 5 MG/1
5 TABLET ORAL
Status: DISCONTINUED | OUTPATIENT
Start: 2021-01-13 | End: 2021-01-13

## 2021-01-13 RX ORDER — FENTANYL CITRATE 50 UG/ML
INJECTION, SOLUTION INTRAMUSCULAR; INTRAVENOUS PRN
Status: DISCONTINUED | OUTPATIENT
Start: 2021-01-13 | End: 2021-01-13

## 2021-01-13 RX ORDER — ONDANSETRON 4 MG/1
4 TABLET, ORALLY DISINTEGRATING ORAL EVERY 6 HOURS PRN
Status: DISCONTINUED | OUTPATIENT
Start: 2021-01-13 | End: 2021-01-13 | Stop reason: HOSPADM

## 2021-01-13 RX ORDER — BUPIVACAINE HYDROCHLORIDE 2.5 MG/ML
INJECTION, SOLUTION INFILTRATION; PERINEURAL PRN
Status: DISCONTINUED | OUTPATIENT
Start: 2021-01-13 | End: 2021-01-13 | Stop reason: HOSPADM

## 2021-01-13 RX ORDER — HYDROMORPHONE HYDROCHLORIDE 1 MG/ML
.3-.5 INJECTION, SOLUTION INTRAMUSCULAR; INTRAVENOUS; SUBCUTANEOUS EVERY 10 MIN PRN
Status: DISCONTINUED | OUTPATIENT
Start: 2021-01-13 | End: 2021-01-13

## 2021-01-13 RX ORDER — IBUPROFEN 600 MG/1
600 TABLET, FILM COATED ORAL EVERY 6 HOURS PRN
Qty: 30 TABLET | Refills: 0 | Status: SHIPPED | OUTPATIENT
Start: 2021-01-13 | End: 2021-02-26

## 2021-01-13 RX ORDER — OXYCODONE HYDROCHLORIDE 5 MG/1
5-10 TABLET ORAL
Status: DISCONTINUED | OUTPATIENT
Start: 2021-01-13 | End: 2021-01-13 | Stop reason: HOSPADM

## 2021-01-13 RX ORDER — PHENAZOPYRIDINE HYDROCHLORIDE 200 MG/1
200 TABLET, FILM COATED ORAL ONCE
Status: COMPLETED | OUTPATIENT
Start: 2021-01-13 | End: 2021-01-13

## 2021-01-13 RX ADMIN — ACETAMINOPHEN 975 MG: 325 TABLET, FILM COATED ORAL at 08:19

## 2021-01-13 RX ADMIN — ONDANSETRON 4 MG: 2 INJECTION INTRAMUSCULAR; INTRAVENOUS at 10:46

## 2021-01-13 RX ADMIN — KETOROLAC TROMETHAMINE 30 MG: 30 INJECTION, SOLUTION INTRAMUSCULAR at 11:04

## 2021-01-13 RX ADMIN — FENTANYL CITRATE 150 MCG: 50 INJECTION, SOLUTION INTRAMUSCULAR; INTRAVENOUS at 09:18

## 2021-01-13 RX ADMIN — ROCURONIUM BROMIDE 50 MG: 10 INJECTION INTRAVENOUS at 09:18

## 2021-01-13 RX ADMIN — PHENAZOPYRIDINE HYDROCHLORIDE 200 MG: 200 TABLET ORAL at 08:19

## 2021-01-13 RX ADMIN — LIDOCAINE HYDROCHLORIDE 50 MG: 10 INJECTION, SOLUTION INFILTRATION; PERINEURAL at 09:18

## 2021-01-13 RX ADMIN — GLYCOPYRROLATE 0.3 MG: 0.2 INJECTION, SOLUTION INTRAMUSCULAR; INTRAVENOUS at 09:20

## 2021-01-13 RX ADMIN — MIDAZOLAM 2 MG: 1 INJECTION INTRAMUSCULAR; INTRAVENOUS at 09:09

## 2021-01-13 RX ADMIN — HYDROXYZINE HYDROCHLORIDE 25 MG: 25 TABLET, FILM COATED ORAL at 14:28

## 2021-01-13 RX ADMIN — PHENYLEPHRINE HYDROCHLORIDE 100 MCG: 10 INJECTION INTRAVENOUS at 10:26

## 2021-01-13 RX ADMIN — DEXAMETHASONE SODIUM PHOSPHATE 8 MG: 4 INJECTION, SOLUTION INTRA-ARTICULAR; INTRALESIONAL; INTRAMUSCULAR; INTRAVENOUS; SOFT TISSUE at 09:18

## 2021-01-13 RX ADMIN — PROPOFOL 150 MCG/KG/MIN: 10 INJECTION, EMULSION INTRAVENOUS at 09:23

## 2021-01-13 RX ADMIN — OXYCODONE HYDROCHLORIDE 5 MG: 5 TABLET ORAL at 14:24

## 2021-01-13 RX ADMIN — SODIUM CHLORIDE, POTASSIUM CHLORIDE, SODIUM LACTATE AND CALCIUM CHLORIDE: 600; 310; 30; 20 INJECTION, SOLUTION INTRAVENOUS at 12:20

## 2021-01-13 RX ADMIN — ACETAMINOPHEN 975 MG: 325 TABLET, FILM COATED ORAL at 13:34

## 2021-01-13 RX ADMIN — DIMENHYDRINATE 25 MG: 50 INJECTION, SOLUTION INTRAMUSCULAR; INTRAVENOUS at 10:46

## 2021-01-13 RX ADMIN — PHENYLEPHRINE HYDROCHLORIDE 100 MCG: 10 INJECTION INTRAVENOUS at 09:42

## 2021-01-13 RX ADMIN — SODIUM CHLORIDE, POTASSIUM CHLORIDE, SODIUM LACTATE AND CALCIUM CHLORIDE: 600; 310; 30; 20 INJECTION, SOLUTION INTRAVENOUS at 08:20

## 2021-01-13 RX ADMIN — GLYCOPYRROLATE 0.2 MG: 0.2 INJECTION, SOLUTION INTRAMUSCULAR; INTRAVENOUS at 10:49

## 2021-01-13 RX ADMIN — LIDOCAINE HYDROCHLORIDE 1 ML: 10 INJECTION, SOLUTION EPIDURAL; INFILTRATION; INTRACAUDAL; PERINEURAL at 08:21

## 2021-01-13 RX ADMIN — CEFAZOLIN SODIUM 2 G: 2 INJECTION, SOLUTION INTRAVENOUS at 09:09

## 2021-01-13 RX ADMIN — PROPOFOL 200 MG: 10 INJECTION, EMULSION INTRAVENOUS at 09:18

## 2021-01-13 RX ADMIN — SODIUM CHLORIDE, POTASSIUM CHLORIDE, SODIUM LACTATE AND CALCIUM CHLORIDE 1000 ML: 600; 310; 30; 20 INJECTION, SOLUTION INTRAVENOUS at 15:48

## 2021-01-13 RX ADMIN — FENTANYL CITRATE 100 MCG: 50 INJECTION, SOLUTION INTRAMUSCULAR; INTRAVENOUS at 09:12

## 2021-01-13 RX ADMIN — MEPERIDINE HYDROCHLORIDE 50 MG: 50 INJECTION, SOLUTION INTRAMUSCULAR; INTRAVENOUS; SUBCUTANEOUS at 10:05

## 2021-01-13 RX ADMIN — PROPOFOL 50 MG: 10 INJECTION, EMULSION INTRAVENOUS at 09:23

## 2021-01-13 SDOH — HEALTH STABILITY: MENTAL HEALTH: CURRENT SMOKER: 0

## 2021-01-13 ASSESSMENT — MIFFLIN-ST. JEOR: SCORE: 1928.84

## 2021-01-13 NOTE — PROGRESS NOTES
See pre-op H&P from 2020.      Plan total laparoscopic hysterectomy.  Upon further review of the chart, it does not appear that fallopian tubes were removed at time of .  Will remove them if they are present.  The ovaries are to remain in place.      Patient understood risks and benefits including risks of bleeding, infection and damage to surrounding structures.  The patient consented to proceed.       Maida Fair M.D.

## 2021-01-13 NOTE — BRIEF OP NOTE
[facility] Brief Operative Note    Pre-operative diagnosis: Excessive bleeding in premenopausal period [N92.4]  Dysmenorrhea [N94.6]   Post-operative diagnosis menorrhagia, dysmenorrhea     Procedure: Procedure(s):  total laparoscopic hysterectomy, Bilateral Salpingectomy   Surgeon(s): Surgeon(s) and Role:     * Maida Fair MD - Primary     * Valentine Kerr MD - Assisting     * Ramón Melara PA-C - Assisting   Estimated blood loss: 75 mL    Specimens: ID Type Source Tests Collected by Time Destination   A : uterus, cervix, bilateral fallopian tubes Organ Uterus, Cervix and Bilateral Fallopian Tubes SURGICAL PATHOLOGY EXAM Maida Fair MD 1/13/2021 10:29 AM       Findings: Boggy 6-8 week sized uterus with normal appearing ovaries.  Normal appearing tubes

## 2021-01-13 NOTE — ANESTHESIA POSTPROCEDURE EVALUATION
Patient: Dalila Galvez    Procedure(s):  total laparoscopic hysterectomy, Bilateral Salpingectomy    Diagnosis:Excessive bleeding in premenopausal period [N92.4]  Dysmenorrhea [N94.6]  Diagnosis Additional Information: No value filed.    Anesthesia Type:  General    Note:  Anesthesia Post Evaluation    Patient location during evaluation: Phase 2 and Bedside  Patient participation: Able to fully participate in evaluation  Level of consciousness: awake and alert  Pain management: adequate  Airway patency: patent  Cardiovascular status: acceptable  Respiratory status: acceptable  Hydration status: acceptable  PONV: none     Anesthetic complications: None          Last vitals:  Vitals:    01/13/21 1145 01/13/21 1200 01/13/21 1215   BP: 100/56 103/60 115/76   Pulse: 93 98 105   Resp: 19 19 18   Temp:   36.6  C (97.8  F)   SpO2: 96% 94% 97%         Electronically Signed By: AP Martinez CRNA  January 13, 2021  1:00 PM

## 2021-01-13 NOTE — DISCHARGE INSTRUCTIONS
Same Day Surgery Discharge Instructions  Special Precautions After Surgery - Adult    1. It is not unusual to feel lightheaded or faint, up to 24 hours after surgery or while taking pain medication.  If you have these symptoms; sit for a few minutes before standing and have someone assist you when getting up.  2. You should rest and relax for the next 24 hours and must have someone stay with you for at least 24 hours after your discharge.  3. DO NOT DRIVE any vehicle or operate mechanical equipment for 24 hours following the end of your surgery.  DO NOT DRIVE while taking narcotic pain medications that have been prescribed by your physician.  If you had a limb operated on, you must be able to use it fully to drive.  4. DO NOT drink alcoholic beverages for 24 hours following surgery or while taking prescription pain medication.  5. Drink clear liquids (apple juice, ginger ale, broth, 7-Up, etc.).  Progress to your regular diet as you feel able.  6. Any questions call your physician and do not make important decisions for 24 hours.    ACTIVITY  ? Per Dr. Fair's instructions       INCISIONAL CARE  ? May shower after 24 hours.  ? Apply ice for comfort, 1/2 hour on and 1/2 hour off for first 48 hours.  ? Be alert for signs of infection:  redness, swelling, heat, drainage of pus, and/or elevated temperature.  Contact your doctor if these occur.        Call for an appointment to return to the clinic in 6 weeks    Medications:  ? Acetaminophen (Tylenol) 650mg.  Next dose due at ___7:30 pm ________   ? Ibuprofen (Motrin, Advil) 600mg.  Next dose due at __5:00pm__  ? Oxycodone 5mg tabs as directed or moderate to severe pain.  Next dose due at ___5:30_pm________  ? Stool softener to avoid constipation.  ? Follow the instructions on the bottle.     Nausea and Vomiting: Nausea and vomiting can occur any time after receiving anesthesia. If you experience nausea and vomiting we encourage you to move to a  clear liquid diet and advance your diet as tolerated. If nausea and vomiting do not improve within 12 hours please call the surgeon or present to the Emergency department.     Break-through Bleeding: If your experience bleeding from your surgical site apply pressure and additional dressing per nurse instruction. For simple problems such as a saturated dressing, you may need to reinforce the dressing with more gauze and tape and put slight pressure on the site. If bleeding does not subside contact the surgeon or present to the Emergency Department.    Post-op Infection: If you develop a fever of 100.4 or greater, have pus like drainage, redness, swelling or severe pain at the surgical site not alleviated with pain medications; please contact the surgeon or present to the Emergency Department.    When to call your healthcare provider  Call if you have any of the following:    You have not had a bowel movement within the time your healthcare team noted    Your bandage soaks through (some bleeding and leakage is normal)    Your pain gets worse and is not eased by pain medicine  Call if you have any of these signs of infection:    Fever of 100.4 F (38 C) or higher, or as directed    Bleeding or swelling that increases    A red, hard, hot, or painful area around the cut or on your legs    Shortness of breath    Chest pain      __________________________________________________________________________________________________________________________________  IMPORTANT NUMBERS:    [facility] [place]:  [phone], [phone]  Same Day Surgery:  [phone], Monday - Friday until 8:30 p.m.  Urgent Care:  [phone]  Emergency Room:  [phone]      OB Clinic:  [phone]

## 2021-01-13 NOTE — OP NOTE
DATE OF PROCEDURE:  2021       PREOPERATIVE DIAGNOSES:   1. Menorrhagia  2. Dysmenorrhea       POSTOPERATIVE DIAGNOSES:     1. Menorrhagia  2. Dysmenorrhea      PROCEDURE:  Total laparoscopic hysterectomy with bilateral salpingectomy       SURGEON:  Maida Fiar MD       ASSISTANT:  Valentine Kerr MD        ANESTHESIA:  General endotracheal.       FRA SCORE:  1.       ESTIMATED BLOOD LOSS:  75mL       I's and O's:  Please see anesthesia records.       COMPLICATIONS:  None.       FINDINGS:  boggy 6-8 week sized uterus with normal tubes and ovaries       INDICATIONS:  Dalila Galvez is a 25 year old  who has had a longstanding history of menorrhagia and dysmenorrhea.  she has no desire for future childbearing and has not had success with combined hormonal contraception or progestin therapy in the past as it relates to her menses.  She desires definitive surgical therapy.        DESCRIPTION OF PROCEDURE:  The patient was brought to the operating room and general anesthesia was administered without difficulty.  She was prepped and draped in the normal sterile fashion in the dorsal lithotomy position.  Benson catheter was placed in the bladder.  A bivalve speculum was placed in the vagina and the anterior lip of the cervix was grasped with a single-tooth tenaculum.  The cervix was progressively dilated.  The  uterine manipulator was introduced and the speculum and tenaculum were removed.       Attention was turned to the abdomen where the infraumbilical fold was infiltrated with 0.25% Marcaine with epinephrine.  A 5 mm incision was made inside the umbilicus.  While tenting the abdominal wall, the Veress needle was carefully introduced and intra-abdominal placement was confirmed by use of a water-filled syringe and a drop in intra-abdominal pressure with insufflation of CO2 gas.  The abdomen was insufflated to 15 mmHg.  The Veress needle was removed and a 5 mm trocar was introduced.   Intra-abdominal placement was confirmed with the laparoscope.  There was no evidence of omental insufflation, bowel injury or major vascular injury.  Care was taken during the entire procedure to avoid injury to bowel, bladder, ureters and major vascular structures.  The patient was placed in Trendelenburg and the above findings were noted.  A 5 mm trocar was placed in the right and left lower quadrants under direct visualization with premedication of 0.25% Marcaine with epinephrine.  Attention was returned to the pelvis where the mesosalpinx was identified bilaterally sequentially desiccated and transected with the LigaSure device.  The utero-ovarian ligaments were identified bilaterally, desiccated and transected with the LigaSure device.  The round ligaments were identified bilaterally, desiccated and transected with the LigaSure device.  The peritoneal bladder flap was created with sequential desiccation and transection with the LigaSure.  The uterine arteries were skeletonized and desiccated and transected with the LigaSure device.  The remaining cardinal ligament complex was sequentially desiccated and transected with the LigaSure device.  Once the KOH cup could be readily palpated circumferential colpotomy was performed using monopolar desiccation.       Attention was turned to the vagina where the cervix was grasped with a single-tooth tenaculum.  The manipulator was removed.  A weighted speculum was placed in the vagina. The specimen was removed.  The uterus, cervix and bilateral fallopian tubes were all parts of the specimen were sent to pathology.  The vaginal cuff was grasped with Allis clamps and the vaginal cuff was reapproximated with 0 Vicryl in a running locked fashion and excellent hemostasis was noted.  This was performed in a vertical direction.  Sponge stick was placed in the vagina.  The remaining instruments were removed.       Attention was returned to the abdomen which was reinsufflated and  copious irrigation was performed of the pelvis and adequate hemostasis was noted.  A small amount of oozing was treated with FloSeal and excellent hemostasis was noted.  All instruments were removed from the abdomen and the abdomen was desufflated.  The skin was closed with 4-0 Monocryl in a simple buried interrupted fashion.        The patient tolerated the procedure well.  Sponge, lap and needle counts were correct x2.  I was present for the entire procedure.  The patient was taken to the recovery room in stable condition.   Rebecca-operative antibiotics were given.  A surgical assistant was required for this surgery for her experience with retraction, achievement of hemostasis, and wound closure.           NEO RODGERS MD

## 2021-01-13 NOTE — OR NURSING
Pt unable to urinate. Infusing fluids. Will continue to monitor.    Anusha Crowley RN on 1/13/2021 at 3:12 PM

## 2021-01-13 NOTE — ANESTHESIA PROCEDURE NOTES
Airway   Date/Time: 1/13/2021 9:21 AM   Patient location during procedure: OR    Staff -   CRNA: Kenna Mayers APRN CRNA  Performed By: CRNA    Consent for Airway   Urgency: elective    Indications and Patient Condition  Indications for airway management: catarino-procedural  Induction type:intravenousMask difficulty assessment: 1 - vent by mask    Final Airway Details  Final airway type: endotracheal airway  Successful airway:ETT - single  Endotracheal Airway Details   ETT size (mm): 7.0  Cuffed: yes  Cuff volume (mL): 7  Successful intubation technique: direct laryngoscopy  Grade View of Cords: 1  Adjucts: stylet  Measured from: gums/teeth  Secured at (cm): 22  Secured with: plastic tape  Bite block used: None    Post intubation assessment   Placement verified by: capnometry, equal breath sounds and chest rise   Number of attempts at approach: 1  Number of other approaches attempted: 0  Secured with:plastic tape  Ease of procedure: easy  Dentition: Intact and Unchanged

## 2021-01-13 NOTE — OR NURSING
Report received from Lindsey CONNORS RN. Pt care assumed at this time.    Anusha Crowley RN on 1/13/2021 at 2:08 PM

## 2021-01-13 NOTE — ANESTHESIA PREPROCEDURE EVALUATION
Anesthesia Pre-Procedure Evaluation    Patient: Dalila Galvez   MRN: [id number] : 1995          Preoperative Diagnosis: Excessive bleeding in premenopausal period [N92.4]  Dysmenorrhea [N94.6]    Procedure(s):  total laparoscopic hysterectomy    Past Medical History:   Diagnosis Date     Chickenpox      PONV (postoperative nausea and vomiting)      Past Surgical History:   Procedure Laterality Date      SECTION N/A 2019    Procedure:  SECTION;  Surgeon: Francis Somers MD;  Location: [facility] OR      SECTION, TUBAL LIGATION, COMBINED Bilateral 2020    Procedure: REPEAT  SECTION;  Surgeon: Lesa Courtney MD;  Location: [facility] OR     GYN SURGERY         Anesthesia Evaluation     . Pt has had prior anesthetic. Type: Regional    History of anesthetic complications (w/ csections)   - PONV        ROS/MED HX    ENT/Pulmonary:       Neurologic:  - neg neurologic ROS     Cardiovascular:  - neg cardiovascular ROS       METS/Exercise Tolerance:     Hematologic:  - neg hematologic  ROS       Musculoskeletal:  - neg musculoskeletal ROS       GI/Hepatic:  - neg GI/hepatic ROS       Renal/Genitourinary:  - ROS Renal section negative       Endo:  - neg endo ROS       Psychiatric:  - neg psychiatric ROS       Infectious Disease:  - neg infectious disease ROS       Malignancy:      - no malignancy   Other: Comment: 8+ weeks postpartum   (+) No chance of pregnancy                         Physical Exam  Normal systems: cardiovascular, pulmonary and dental    Airway   Mallampati: II  TM distance: >3 FB  Neck ROM: full    Dental     Cardiovascular       Pulmonary             Lab Results   Component Value Date    WBC 9.0 2020    HGB 14.4 2021    HCT 44.0 2020     2020     10/22/2020    POTASSIUM 3.5 10/22/2020    CHLORIDE 108 10/22/2020    CO2 25 10/22/2020    BUN 6 (L) 10/22/2020    CR 0.87 2021     (H) 10/22/2020    STEVEN 9.2  "10/22/2020    ALBUMIN 2.8 (L) 10/22/2020    PROTTOTAL 6.7 (L) 10/22/2020    ALT 27 11/11/2020    AST 18 11/11/2020    ALKPHOS 159 (H) 10/22/2020    BILITOTAL 0.4 10/22/2020    HCG Negative 01/13/2021       Preop Vitals  BP Readings from Last 3 Encounters:   01/13/21 121/75   12/28/20 116/70   11/20/20 116/77    Pulse Readings from Last 3 Encounters:   01/13/21 74   12/28/20 80   11/20/20 73      Resp Readings from Last 3 Encounters:   01/13/21 16   11/20/20 18   11/13/20 16    SpO2 Readings from Last 3 Encounters:   01/13/21 97%   11/13/20 98%   02/23/19 95%      Temp Readings from Last 1 Encounters:   01/13/21 37.1  C (98.7  F) (Oral)    Ht Readings from Last 1 Encounters:   01/13/21 1.702 m (5' 7\")      Wt Readings from Last 1 Encounters:   01/13/21 116.1 kg (256 lb)    Estimated body mass index is 40.1 kg/m  as calculated from the following:    Height as of this encounter: 1.702 m (5' 7\").    Weight as of this encounter: 116.1 kg (256 lb).       Anesthesia Plan      History & Physical Review  History and physical reviewed and following examination; no interval change.    ASA Status:  1 .    NPO Status:  > 8 hours    Plan for General with Intravenous and Propofol induction. Maintenance will be Balanced.    PONV prophylaxis:  Ondansetron (or other 5HT-3), Dexamethasone or Solumedrol and Meclizine or Dimenhydrinate  preop H&P reviewed. Was done by Federica Jimenez on 12/28/20   The patient is not a current smoker      Postoperative Care  Postoperative pain management:  IV analgesics, Oral pain medications and Multi-modal analgesia.      Consents  Anesthetic plan, risks, benefits and alternatives discussed with:  Patient.  Use of blood products discussed: No .   .                 AP Matrinez CRNA  "

## 2021-01-13 NOTE — ANESTHESIA CARE TRANSFER NOTE
Patient: Dalila Galvez    Procedure(s):  total laparoscopic hysterectomy, Bilateral Salpingectomy    Diagnosis: Excessive bleeding in premenopausal period [N92.4]  Dysmenorrhea [N94.6]  Diagnosis Additional Information: No value filed.    Anesthesia Type:   General     Note:  Airway :Oral Airway  Patient transferred to:PACU  Comments: Patient's VSS. Spontaneous respirations. Patient awake and oriented. IV patent. Report to CHASTITY Robertson.Handoff Report: Identifed the Patient, Identified the Reponsible Provider, Reviewed the pertinent medical history, Discussed the surgical course, Reviewed Intra-OP anesthesia mangement and issues during anesthesia, Set expectations for post-procedure period and Allowed opportunity for questions and acknowledgement of understanding      Vitals: (Last set prior to Anesthesia Care Transfer)    CRNA VITALS  1/13/2021 1058 - 1/13/2021 1129      1/13/2021             Pulse:  99    SpO2:  95 %    Resp Rate (observed):  (!) 4                Electronically Signed By: AP Martinez CRNA  January 13, 2021  11:29 AM

## 2021-01-13 NOTE — OR NURSING
Pt was able to urinate with a voided amount of 100 ml. Pt denies discomfort at this time.        Anusha Crowley RN on 1/13/2021 at 3:49 PM

## 2021-01-13 NOTE — OR NURSING
1130 patient arrived from OR on cart. Oral airway in, scd's on bilaterally, simple mask @ 6L, catarino pad in place, hernandez cath in place, bare hugger applied.

## 2021-01-18 LAB — COPATH REPORT: NORMAL

## 2021-02-26 ENCOUNTER — OFFICE VISIT (OUTPATIENT)
Dept: OBGYN | Facility: CLINIC | Age: 26
End: 2021-02-26
Payer: COMMERCIAL

## 2021-02-26 VITALS
SYSTOLIC BLOOD PRESSURE: 118 MMHG | HEART RATE: 91 BPM | DIASTOLIC BLOOD PRESSURE: 67 MMHG | HEIGHT: 67 IN | BODY MASS INDEX: 39.39 KG/M2 | TEMPERATURE: 98.7 F | WEIGHT: 251 LBS | RESPIRATION RATE: 18 BRPM

## 2021-02-26 DIAGNOSIS — Z09 POSTOP CHECK: Primary | ICD-10-CM

## 2021-02-26 PROBLEM — N94.6 DYSMENORRHEA: Status: RESOLVED | Noted: 2020-11-20 | Resolved: 2021-02-26

## 2021-02-26 PROBLEM — N92.4 EXCESSIVE BLEEDING IN PREMENOPAUSAL PERIOD: Status: RESOLVED | Noted: 2020-11-20 | Resolved: 2021-02-26

## 2021-02-26 PROCEDURE — 99024 POSTOP FOLLOW-UP VISIT: CPT | Performed by: OBSTETRICS & GYNECOLOGY

## 2021-02-26 ASSESSMENT — MIFFLIN-ST. JEOR: SCORE: 1906.16

## 2021-02-26 NOTE — NURSING NOTE
"Initial /67 (BP Location: Left arm, Patient Position: Chair, Cuff Size: Adult Large)   Pulse 91   Temp 98.7  F (37.1  C) (Tympanic)   Resp 18   Ht 1.702 m (5' 7\")   Wt 113.9 kg (251 lb)   LMP 02/14/2020   Breastfeeding No   BMI 39.31 kg/m   Estimated body mass index is 39.31 kg/m  as calculated from the following:    Height as of this encounter: 1.702 m (5' 7\").    Weight as of this encounter: 113.9 kg (251 lb). .    Sommer Saini, [facility]    "

## 2021-02-26 NOTE — PROGRESS NOTES
"Dalila is a 25 year old  6 weeks s/p total laparoscopic hysterectomy complicated by no problems reported.  She is currently requiring nothing for pain management.  + vaginal spotting still, - hot flashes.  - GI/ complaints.  Energy level is good.  Denies fever.    The pathology report showed:   Myometrial vessels post delUterus, cervix, and bilateral fallopian tubes:   - Benign proliferative endometrium without atypia   - Subinvolution of myometrial vessels post prior delivery (see history).   - Normal cervix.   - Normal bilateral fallopian tubes    PE: /67 (BP Location: Left arm, Patient Position: Chair, Cuff Size: Adult Large)   Pulse 91   Temp 98.7  F (37.1  C) (Tympanic)   Resp 18   Ht 1.702 m (5' 7\")   Wt 113.9 kg (251 lb)   LMP 2020   Breastfeeding No   BMI 39.31 kg/m    Abd: soft, non tender, no masses  Incision: intact, no erythema, induration or discharge  NEFG  Vagina: cuff healing--still small areas where the suture has let go that are pink/red, no lesions  BME: deferred    A/P 6 weeks s/p total laparoscopic hysterectomy     1.  Recommended additional 1-2 weeks of pelvic rest (until the bleeding resolves.   Otherwise no further restrictions.  RTC prn or for annual examination.     Maida Fair M.D.   "

## 2021-03-10 ENCOUNTER — MEDICAL CORRESPONDENCE (OUTPATIENT)
Dept: HEALTH INFORMATION MANAGEMENT | Facility: CLINIC | Age: 26
End: 2021-03-10

## 2021-06-09 NOTE — PROGRESS NOTES
"Please see \"Imaging\" tab under Chart Review for full report.  This ultrasound was performed in the [facility], and may be located under Care Everywhere.    Mckayla Esparza MD  Maternal Fetal Medicine    "

## 2021-06-09 NOTE — PROGRESS NOTES
[facility]  Genetic Counseling Consult    Patient: Dalila Galvez YOB: 1995       Dalila Galvez was evaluated via a billable telephone visit at [facility] for genetic consultation given family history of congenital heart defect.     The patient has been notified of the following:  This telephone visit will be conducted via a call between you and your physician/provider. We have found that certain health care needs can be provided without the need for a physical exam. This service lets us provide the care you need with a short phone conversation. If a prescription is necessary we can send it directly to your pharmacy. If lab work is needed we can place an order for that and you can then stop by our lab to have the test done at a later time.     If during the course of the call the provider feels a telephone visit is not appropriate, you will not be charged for this service.          Impression/Plan:   1. Dalila's  has a history of a congenital heart defect that required surgical repair. Comprehensive ultrasound and fetal echocardiogram performed in previous pregnancy. Same plan for this pregnancy    2. Dalila has a comprehensive (level II) ultrasound scheduled for later today today.  Please see the ultrasound report for further details.    3. Dalila has declined genetic amniocentesis and maternal serum screening in this pregnancy.     Pregnancy History:   /Parity:    Age at Delivery: 25 y.o.  NOA: 2020, by Last Menstrual Period  Gestational Age: 19w3d    No significant complications or exposures were reported in the current pregnancy.    Dalila blanco pregnancy history is significant for a term  in 2019, healthy male.    Medical History:   Dalila blanco reported medical history is not expected to impact pregnancy management or risks to fetal development.       Family History:   A three-generation pedigree  "was obtained, and is scanned under the  Media  tab.   The following significant findings were reported by Dalila:    [place]'s , Shravan, reports that he was born with a \"hold in his heart.\" He believes it may have been in his left ventricle, but is unsure about the specifics. Dalila reports that she has a maternal first cousin once removed who was born with a \"hole in his heart.\" Congenital heart defects are common, occurring in 5 to 10 per 1000 live births. One type of congenital heart defect commonly referred to as a   hole in the heart  is a patent foramen ovale, however there are many different abnormalities that can be referred to as a \"hole in the heart\". The specific recurrence risk for first degree relatives differs according to the type of congenital heart defect and if there is an associated genetic syndrome present. Without a known subtype of congenital heart defect in Shravan it is difficult to provide a recurrence risk to this pregnancy. In general, studies show that the overall risk contributed by a positive family history of a congenital heart defect in 1st degree relatives for the same defect is  8.15% whereas the recurrence risk for a different heart defect is 2.68%. We reviewed that today's ultrasound will include a look at the baby's heart to determine if there is suspicion for a heart defect. If there is, a fetal echocardiogram will likely be recommended.     Dalila reports that she has a maternal first cousin with spina bifida. Spina bifida is a condition that occurs when the neural tube does not close completely during the first few weeks of development which can result in abnormalities in spine formation. The cause of this condition is likely multifactorial, including multiple genetic and environmental factors.    Otherwise, the reported family history is negative for multiple miscarriages, stillbirths, birth defects, mental retardation, known genetic conditions, and consanguinity.       Carrier " Screening:   The patient reports that she and the father of the pregnancy have  ancestry:     Cystic fibrosis is an autosomal recessive genetic condition that occurs with increased frequency in individuals of  ancestry and carrier screening for this condition is available.  In addition,  screening in the [facility] includes cystic fibrosis.      Expanded carrier screening for mutations in a large panel of genes associated with autosomal recessive conditions including cystic fibrosis, spinal muscular atrophy, and others, is now available.      Carrier screening was not discussed today.       Risk Assessment for Chromosome Conditions:   We explained that the risk for fetal chromosome abnormalities increases with maternal age. We discussed specific features of common chromosome abnormalities, including Down syndrome, trisomy 13, trisomy 18, and sex chromosome trisomies.      - At age 25 at midtrimester, the risk to have a baby with Down syndrome is 1 in 928.     - At age 25 at midtrimester, the risk to have a baby with any chromosome abnormality is 1 in 464.       Dalila did not have maternal serum screening earlier in pregnancy.       Testing Options:   We discussed the following options:   Quad screen:     Maternal plasma AFP, hCG, estriol, and inhibin measurement between 15-24 weeks gestation    Provides risk assessment for fetal Down syndrome, trisomy 18, and neural tube defects     Comprehensive (Level II) ultrasound: Detailed ultrasound performed between 18-22 weeks gestation to screen for major birth defects and markers for aneuploidy.    We reviewed the benefits and limitations of this testing.  Screening tests provide a risk assessment specific to the pregnancy for certain fetal chromosome abnormalities, but cannot definitively diagnose or exclude a fetal chromosome abnormality.  Follow-up genetic counseling and consideration of diagnostic testing is recommended with any abnormal  screening result.     Diagnostic tests carry inherent risks- including risk of miscarriage- that require careful consideration.  These tests can detect fetal chromosome abnormalities with greater than 99% certainty.  Results can be compromised by maternal cell contamination or mosaicism, and are limited by the resolution of cytogenetic G-banding technology.  There is no screening nor diagnostic test that can detect all forms of birth defects or mental disability.     It was a pleasure to be involved with Dalila blanco care.     Phone Call Contact Time  Call Started 8:57am  Call Ended 9:05am      Bess Rivas MS, [facility]  Licensed Genetic Counselor   [facility]  [facility]  [email or web address]   [place] Office: [phone]   [place] Main [phone]  [facility] Office: [phone]  Pager: [phone] Fax: [phone]

## 2021-06-10 NOTE — PROGRESS NOTES
"Please see \"Imaging\" tab under Chart Review for full report.  This ultrasound was performed in the [facility], and may be located under Care Everywhere.    Mckayla Esparza MD  Maternal Fetal Medicine    "

## 2021-06-11 NOTE — PROGRESS NOTES
"Please see \"Imaging\" tab under Chart Review for full report.  This ultrasound was performed in the [facility], and may be located under Care Everywhere.    Kia Franz MD  Maternal Fetal Medicine    "

## 2021-09-18 ENCOUNTER — HEALTH MAINTENANCE LETTER (OUTPATIENT)
Age: 26
End: 2021-09-18

## 2022-01-08 ENCOUNTER — HEALTH MAINTENANCE LETTER (OUTPATIENT)
Age: 27
End: 2022-01-08

## 2022-02-14 ENCOUNTER — OFFICE VISIT (OUTPATIENT)
Dept: FAMILY MEDICINE | Facility: CLINIC | Age: 27
End: 2022-02-14
Payer: COMMERCIAL

## 2022-02-14 VITALS
DIASTOLIC BLOOD PRESSURE: 78 MMHG | TEMPERATURE: 98.1 F | WEIGHT: 268 LBS | RESPIRATION RATE: 12 BRPM | HEART RATE: 72 BPM | SYSTOLIC BLOOD PRESSURE: 102 MMHG | BODY MASS INDEX: 40.62 KG/M2 | HEIGHT: 68 IN

## 2022-02-14 DIAGNOSIS — Z00.00 ROUTINE GENERAL MEDICAL EXAMINATION AT A HEALTH CARE FACILITY: Primary | ICD-10-CM

## 2022-02-14 DIAGNOSIS — E66.01 MORBID OBESITY (H): ICD-10-CM

## 2022-02-14 DIAGNOSIS — L67.8 BRITTLE HAIR: ICD-10-CM

## 2022-02-14 LAB
ANION GAP SERPL CALCULATED.3IONS-SCNC: 5 MMOL/L (ref 3–14)
BUN SERPL-MCNC: 12 MG/DL (ref 7–30)
CALCIUM SERPL-MCNC: 9.2 MG/DL (ref 8.5–10.1)
CHLORIDE BLD-SCNC: 104 MMOL/L (ref 94–109)
CO2 SERPL-SCNC: 29 MMOL/L (ref 20–32)
CREAT SERPL-MCNC: 0.72 MG/DL (ref 0.52–1.04)
GFR SERPL CREATININE-BSD FRML MDRD: >90 ML/MIN/1.73M2
GLUCOSE BLD-MCNC: 100 MG/DL (ref 70–99)
POTASSIUM BLD-SCNC: 4 MMOL/L (ref 3.4–5.3)
SODIUM SERPL-SCNC: 138 MMOL/L (ref 133–144)
T4 FREE SERPL-MCNC: 0.8 NG/DL (ref 0.76–1.46)
TSH SERPL DL<=0.005 MIU/L-ACNC: 5.32 MU/L (ref 0.4–4)

## 2022-02-14 PROCEDURE — 99395 PREV VISIT EST AGE 18-39: CPT | Performed by: NURSE PRACTITIONER

## 2022-02-14 PROCEDURE — 36415 COLL VENOUS BLD VENIPUNCTURE: CPT | Performed by: NURSE PRACTITIONER

## 2022-02-14 PROCEDURE — 80048 BASIC METABOLIC PNL TOTAL CA: CPT | Performed by: NURSE PRACTITIONER

## 2022-02-14 PROCEDURE — 84439 ASSAY OF FREE THYROXINE: CPT | Performed by: NURSE PRACTITIONER

## 2022-02-14 PROCEDURE — 84443 ASSAY THYROID STIM HORMONE: CPT | Performed by: NURSE PRACTITIONER

## 2022-02-14 ASSESSMENT — ENCOUNTER SYMPTOMS
WEAKNESS: 0
DIZZINESS: 0
SHORTNESS OF BREATH: 0
ABDOMINAL PAIN: 0
FREQUENCY: 0
DYSURIA: 0
BREAST MASS: 0
MYALGIAS: 0
CONSTIPATION: 0
NAUSEA: 0
HEMATOCHEZIA: 0
PARESTHESIAS: 0
DIARRHEA: 0
HEARTBURN: 0
FEVER: 0
EYE PAIN: 0
PALPITATIONS: 0
COUGH: 0
ARTHRALGIAS: 0
NERVOUS/ANXIOUS: 0
HEMATURIA: 0
CHILLS: 0
JOINT SWELLING: 0
SORE THROAT: 0
HEADACHES: 0

## 2022-02-14 ASSESSMENT — MIFFLIN-ST. JEOR: SCORE: 1994.14

## 2022-02-14 ASSESSMENT — PAIN SCALES - GENERAL: PAINLEVEL: NO PAIN (0)

## 2022-02-14 NOTE — PROGRESS NOTES
SUBJECTIVE:   CC: Dalila Galvez is an 26 year old woman who presents for preventive health visit.       Patient has been advised of split billing requirements and indicates understanding: Yes  Healthy Habits:     Getting at least 3 servings of Calcium per day:  Yes    Bi-annual eye exam:  Yes    Dental care twice a year:  Yes    Sleep apnea or symptoms of sleep apnea:  None    Diet:  Regular (no restrictions)    Frequency of exercise:  None    Taking medications regularly:  Yes    Medication side effects:  None    PHQ-2 Total Score: 0    Additional concerns today:  Yes      Thyroid question   notes more brittle hair and has had hair loss over the last year or so   No constipation issues  Does have difficulty losing weight  No excessive dry skin         Today's PHQ-2 Score:   PHQ-2 ( 1999 Pf[employer]) 2/14/2022   Q1: Little interest or pleasure in doing things 0   Q2: Feeling down, depressed or hopeless 0   PHQ-2 Score 0   PHQ-2 Total Score (12-17 Years)- Positive if 3 or more points; Administer PHQ-A if positive -   Q1: Little interest or pleasure in doing things Not at all   Q2: Feeling down, depressed or hopeless Not at all   PHQ-2 Score 0       Abuse: Current or Past (Physical, Sexual or Emotional) - No  Do you feel safe in your environment? Yes        Social History     Tobacco Use     Smoking status: Never Smoker     Smokeless tobacco: Never Used   Substance Use Topics     Alcohol use: Yes     Comment: rare-quit with pregnancy     If you drink alcohol do you typically have >3 drinks per day or >7 drinks per week? No    Alcohol Use 2/14/2022   Prescreen: >3 drinks/day or >7 drinks/week? No   Prescreen: >3 drinks/day or >7 drinks/week? -       Reviewed orders with patient.  Reviewed health maintenance and updated orders accordingly - Yes  Lab work is in process  Labs reviewed in EPIC  BP Readings from Last 3 Encounters:   02/14/22 102/78   02/26/21 118/67   01/13/21 117/67    Wt Readings from Last  3 Encounters:   22 121.6 kg (268 lb)   21 113.9 kg (251 lb)   21 116.1 kg (256 lb)                  Patient Active Problem List   Diagnosis     BMI 39.0-39.9,adult     Cyst of subcutaneous tissue     Morbid obesity (H)     Past Surgical History:   Procedure Laterality Date      SECTION N/A 2019    Procedure:  SECTION;  Surgeon: Francis Somers MD;  Location: [facility]      SECTION, TUBAL LIGATION, COMBINED Bilateral 2020    Procedure: REPEAT  SECTION;  Surgeon: Lesa Courtney MD;  Location: [facility]     GYN SURGERY       HYSTERECTOMY, PAP NO LONGER INDICATED  2021    dysmenorrhea, menorrhagia     LAPAROSCOPIC HYSTERECTOMY TOTAL N/A 2021    Procedure: total laparoscopic hysterectomy, Bilateral Salpingectomy;  Surgeon: Maida Fair MD;  Location: [facility]       Social History     Tobacco Use     Smoking status: Never Smoker     Smokeless tobacco: Never Used   Substance Use Topics     Alcohol use: Yes     Comment: rare-quit with pregnancy     Family History   Problem Relation Age of Onset     Heart Disease Maternal Grandfather      Coronary Artery Disease Paternal Grandfather         MI     Cancer Paternal Aunt         uterine and ovary         No current outpatient medications on file.     No Known Allergies    Breast Cancer Screening:  Any new diagnosis of family breast, ovarian, or bowel cancer? No    Breast CA Risk Assessment (FHS-7) 2021   Do you have a family history of breast, colon, or ovarian cancer? No / Unknown       Patient under 40 years of age: Routine Mammogram Screening not recommended.   Pertinent mammograms are reviewed under the imaging tab.    History of abnormal Pap smear: Status post benign hysterectomy. Health Maintenance and Surgical History updated.  PAP / HPV Latest Ref Rng & Units 2018   PAP (Historical) - NIL   HPV16 NEG:Negative Negative   HPV18 NEG:Negative Negative   HRHPV NEG:Negative  Negative     Reviewed and updated as needed this visit by clinical staff  Tobacco  Allergies  Meds  Problems            Reviewed and updated as needed this visit by Provider    Meds  Problems           Past Medical History:   Diagnosis Date     Chickenpox      Dysmenorrhea 2020    Added automatically from request for surgery [id number]     Excessive bleeding in premenopausal period 2020    Added automatically from request for surgery [id number]     PONV (postoperative nausea and vomiting)       Past Surgical History:   Procedure Laterality Date      SECTION N/A 2019    Procedure:  SECTION;  Surgeon: Francis Somers MD;  Location: [facility]      SECTION, TUBAL LIGATION, COMBINED Bilateral 2020    Procedure: REPEAT  SECTION;  Surgeon: Lesa Courtney MD;  Location: [facility]     GYN SURGERY       HYSTERECTOMY, PAP NO LONGER INDICATED  2021    dysmenorrhea, menorrhagia     LAPAROSCOPIC HYSTERECTOMY TOTAL N/A 2021    Procedure: total laparoscopic hysterectomy, Bilateral Salpingectomy;  Surgeon: Maida Fair MD;  Location: [facility]     OB History    Para Term  AB Living   2 2 2 0 0 2   SAB IAB Ectopic Multiple Live Births   0 0 0 0 2      # Outcome Date GA Lbr Benigno/2nd Weight Sex Delivery Anes PTL Lv   2 Term 20 38w5d  3.74 kg (8 lb 3.9 oz) M   N ANÍBAL      Name: HELADIO PATTON      Apgar1: 7  Apgar5: 9   1 Term 19 37w5d  3.289 kg (7 lb 4 oz) M CS-LTranv EPI N ANÍBAL      Birth Comments: NP in attendance at delivery for failure to progress and category 2 tracing with fetal tachycardia and minimal variability. ROM for >24hrs. Infant was placed on maternal abdomen for delayed cord clamping. Cried immediately. Brought to warmer and was dried/stimulated. Initially lung sounds coarse. Apgars 7/9. Lungs cleared quickly. At ~15 minutes of life infant became tachycardic to 200 (previously 160's) and began grunting  "intermittently. RR 80's. O2 sats within goal range throughout. CPAP done for ~15 minutes. Delee suctioned x2 for ~2mL thick clear secretions. HR improved to 150-180's. Continued to grunt intermittently. Brought back to special care nursery for further care.       Complications: Cephalopelvic Disproportion, Failure to Progress in First Stage, Cholestasis      Name: Elias      Apgar1: 7  Apgar5: 9       Review of Systems   Constitutional: Negative for chills and fever.   HENT: Negative for congestion, ear pain, hearing loss and sore throat.    Eyes: Negative for pain and visual disturbance.   Respiratory: Negative for cough and shortness of breath.    Cardiovascular: Negative for chest pain, palpitations and peripheral edema.   Gastrointestinal: Negative for abdominal pain, constipation, diarrhea, heartburn, hematochezia and nausea.   Breasts:  Negative for tenderness, breast mass and discharge.   Genitourinary: Negative for dysuria, frequency, genital sores, hematuria, pelvic pain, urgency, vaginal bleeding and vaginal discharge.   Musculoskeletal: Negative for arthralgias, joint swelling and myalgias.   Skin: Negative for rash.   Neurological: Negative for dizziness, weakness, headaches and paresthesias.   Psychiatric/Behavioral: Negative for mood changes. The patient is not nervous/anxious.         OBJECTIVE:   /78   Pulse 72   Temp 98.1  F (36.7  C) (Tympanic)   Resp 12   Ht 1.727 m (5' 8\")   Wt 121.6 kg (268 lb)   LMP 02/14/2020   BMI 40.75 kg/m    Physical Exam  GENERAL: healthy, alert and no distress  EYES: Eyes grossly normal to inspection, PERRL and conjunctivae and sclerae normal  HENT: ear canals and TM's normal, nose and mouth without ulcers or lesions  NECK: no adenopathy, no asymmetry, masses, or scars and thyroid normal to palpation  RESP: lungs clear to auscultation - no rales, rhonchi or wheezes  BREAST: normal without masses, tenderness or nipple discharge and no palpable axillary " "masses or adenopathy  CV: regular rate and rhythm, normal S1 S2, no S3 or S4, no murmur, click or rub, no peripheral edema and peripheral pulses strong  ABDOMEN: soft, nontender, no hepatosplenomegaly, no masses and bowel sounds normal  MS: no gross musculoskeletal defects noted, no edema  SKIN: no suspicious lesions or rashes  NEURO: Normal strength and tone, mentation intact and speech normal  PSYCH: mentation appears normal, affect normal/bright    Diagnostic Test Results:  Labs reviewed in Epic  Pending     ASSESSMENT/PLAN:   (Z00.00) Routine general medical examination at a health care facility  (primary encounter diagnosis)  Comment: Healthy, 26-year-old female.  Discussion of concerns as below.  Plan: Basic metabolic panel  (Ca, Cl, CO2, Creat,         Gluc, K, Na, BUN)          (E66.01) Morbid obesity (H)  Comment: Chronic, has been stable.  Not really doing too much to promote weight loss.  Discussed healthy dietary options and trying to eat 5 small meals throughout the day, increasing water intake throughout the day as well as increasing daily physical activity.    (L67.8) Brittle hair  Comment: Patient notes increased brittle hair recently as well as hair loss.  Discussed this may be underlying hormonal issue or due to stress.  Will check thyroid hormone today and notify patient results and any further recommendations.  Plan: TSH with free T4 reflex            Patient has been advised of split billing requirements and indicates understanding: Yes    COUNSELING:  Reviewed preventive health counseling, as reflected in patient instructions . After discussion with patient, patient verbalizes and agreeable to receive AVS instructions via My Chart, not printed today     Estimated body mass index is 40.75 kg/m  as calculated from the following:    Height as of this encounter: 1.727 m (5' 8\").    Weight as of this encounter: 121.6 kg (268 lb).    Weight management plan: Discussed healthy diet and exercise " guidelines    She reports that she has never smoked. She has never used smokeless tobacco.      Counseling Resources:  ATP IV Guidelines  Pooled Cohorts Equation Calculator  Breast Cancer Risk Calculator  BRCA-Related Cancer Risk Assessment: FHS-7 Tool  FRAX Risk Assessment  ICSI Preventive Guidelines  Dietary Guidelines for Americans, 2010  USDA's MyPlate  ASA Prophylaxis  Lung CA Screening    Federica Kimbrough DNP, APRN-CNP   [facility]    Chart documentation with Dragon Voice recognition Software. Although reviewed after completion, some words and grammatical errors may remain.

## 2022-02-14 NOTE — PATIENT INSTRUCTIONS
(Z00.00) Routine general medical examination at a health care facility  (primary encounter diagnosis)  Comment: Healthy, 26-year-old female.  Discussion of concerns as below.  Plan: Basic metabolic panel  (Ca, Cl, CO2, Creat,         Gluc, K, Na, BUN)          (E66.01) Morbid obesity (H)  Comment: Chronic, has been stable.  Not really doing too much to promote weight loss.  Discussed healthy dietary options and trying to eat 5 small meals throughout the day, increasing water intake throughout the day as well as increasing daily physical activity.    (L67.8) Brittle hair  Comment: Patient notes increased brittle hair recently as well as hair loss.  Discussed this may be underlying hormonal issue or due to stress.  Will check thyroid hormone today and notify patient results and any further recommendations.  Plan: TSH with free T4 reflex      Preventive Health Recommendations  Female Ages 26 - 39  Yearly exam:   See your health care provider every year in order to    Review health changes.     Discuss preventive care.      Review your medicines if you your doctor has prescribed any.    Until age 30: Get a Pap test every three years (more often if you have had an abnormal result).    After age 30: Talk to your doctor about whether you should have a Pap test every 3 years or have a Pap test with HPV screening every 5 years.   You do not need a Pap test if your uterus was removed (hysterectomy) and you have not had cancer.  You should be tested each year for STDs (sexually transmitted diseases), if you're at risk.   Talk to your provider about how often to have your cholesterol checked.  If you are at risk for diabetes, you should have a diabetes test (fasting glucose).  Shots: Get a flu shot each year. Get a tetanus shot every 10 years.   Nutrition:     Eat at least 5 servings of fruits and vegetables each day.    Eat whole-grain bread, whole-wheat pasta and brown rice instead of white grains and rice.    Get adequate  Calcium and Vitamin D.     Lifestyle    Exercise at least 150 minutes a week (30 minutes a day, 5 days of the week). This will help you control your weight and prevent disease.    Limit alcohol to one drink per day.    No smoking.     Wear sunscreen to prevent skin cancer.    See your dentist every six months for an exam and cleaning.

## 2022-02-14 NOTE — NURSING NOTE
"Chief Complaint   Patient presents with     Physical     /78   Pulse 72   Temp 98.1  F (36.7  C) (Tympanic)   Resp 12   Ht 1.727 m (5' 8\")   Wt 121.6 kg (268 lb)   LMP 02/14/2020   BMI 40.75 kg/m   Estimated body mass index is 40.75 kg/m  as calculated from the following:    Height as of this encounter: 1.727 m (5' 8\").    Weight as of this encounter: 121.6 kg (268 lb).  Patient presents to the clinic using No DME      Health Maintenance that is potentially due pending provider review:    Health Maintenance Due   Topic Date Due     ANNUAL REVIEW OF HM ORDERS  Never done     COVID-19 Vaccine (1) Never done     HEPATITIS C SCREENING  Never done     PREVENTIVE CARE VISIT  04/03/2020     INFLUENZA VACCINE (1) 09/01/2021        Declines vaccines.        "

## 2022-02-15 DIAGNOSIS — E03.8 SUBCLINICAL HYPOTHYROIDISM: Primary | ICD-10-CM

## 2022-02-15 RX ORDER — LEVOTHYROXINE SODIUM 150 UG/1
150 TABLET ORAL DAILY
Qty: 90 TABLET | Refills: 0 | Status: SHIPPED | OUTPATIENT
Start: 2022-02-15 | End: 2022-04-18

## 2022-04-14 ENCOUNTER — LAB (OUTPATIENT)
Dept: LAB | Facility: CLINIC | Age: 27
End: 2022-04-14
Payer: COMMERCIAL

## 2022-04-14 DIAGNOSIS — E03.8 SUBCLINICAL HYPOTHYROIDISM: ICD-10-CM

## 2022-04-14 DIAGNOSIS — Z11.59 NEED FOR HEPATITIS C SCREENING TEST: Primary | ICD-10-CM

## 2022-04-14 LAB
HCV AB SERPL QL IA: NONREACTIVE
T4 FREE SERPL-MCNC: 1.28 NG/DL (ref 0.76–1.46)
TSH SERPL DL<=0.005 MIU/L-ACNC: 0.15 MU/L (ref 0.4–4)

## 2022-04-14 PROCEDURE — 36415 COLL VENOUS BLD VENIPUNCTURE: CPT

## 2022-04-14 PROCEDURE — 86803 HEPATITIS C AB TEST: CPT

## 2022-04-14 PROCEDURE — 84443 ASSAY THYROID STIM HORMONE: CPT

## 2022-04-14 PROCEDURE — 84439 ASSAY OF FREE THYROXINE: CPT

## 2022-04-18 DIAGNOSIS — E03.8 SUBCLINICAL HYPOTHYROIDISM: ICD-10-CM

## 2022-04-18 RX ORDER — LEVOTHYROXINE SODIUM 137 UG/1
137 TABLET ORAL DAILY
Qty: 60 TABLET | Refills: 0 | Status: SHIPPED | OUTPATIENT
Start: 2022-04-18 | End: 2022-06-14

## 2022-06-09 ENCOUNTER — MYC REFILL (OUTPATIENT)
Dept: FAMILY MEDICINE | Facility: CLINIC | Age: 27
End: 2022-06-09
Payer: COMMERCIAL

## 2022-06-09 DIAGNOSIS — E03.8 SUBCLINICAL HYPOTHYROIDISM: ICD-10-CM

## 2022-06-09 RX ORDER — LEVOTHYROXINE SODIUM 137 UG/1
137 TABLET ORAL DAILY
Qty: 60 TABLET | Refills: 0 | Status: CANCELLED | OUTPATIENT
Start: 2022-06-09

## 2022-06-10 NOTE — TELEPHONE ENCOUNTER
Left detailed message for patient to return call. Does she have enough levothyroxine to get by until her lab appt on 06/16/22?  Audra BURGOS RN

## 2022-06-14 RX ORDER — LEVOTHYROXINE SODIUM 137 UG/1
137 TABLET ORAL DAILY
Qty: 30 TABLET | Refills: 0 | Status: SHIPPED | OUTPATIENT
Start: 2022-06-14 | End: 2022-06-17

## 2022-06-16 ENCOUNTER — LAB (OUTPATIENT)
Dept: LAB | Facility: CLINIC | Age: 27
End: 2022-06-16
Payer: COMMERCIAL

## 2022-06-16 DIAGNOSIS — E03.8 SUBCLINICAL HYPOTHYROIDISM: ICD-10-CM

## 2022-06-16 LAB
T4 FREE SERPL-MCNC: 1.25 NG/DL (ref 0.76–1.46)
TSH SERPL DL<=0.005 MIU/L-ACNC: 0.06 MU/L (ref 0.4–4)

## 2022-06-16 PROCEDURE — 84439 ASSAY OF FREE THYROXINE: CPT

## 2022-06-16 PROCEDURE — 36415 COLL VENOUS BLD VENIPUNCTURE: CPT

## 2022-06-16 PROCEDURE — 84443 ASSAY THYROID STIM HORMONE: CPT

## 2022-06-17 DIAGNOSIS — E03.8 SUBCLINICAL HYPOTHYROIDISM: ICD-10-CM

## 2022-06-17 RX ORDER — LEVOTHYROXINE SODIUM 125 UG/1
125 TABLET ORAL DAILY
Qty: 30 TABLET | Refills: 2 | Status: SHIPPED | OUTPATIENT
Start: 2022-06-17 | End: 2022-08-23

## 2022-06-17 NOTE — RESULT ENCOUNTER NOTE
Aurelio Conner,  I am reviewing the lab tests for Federica as she is out of the office this week.  TSH is low.  This indicates that thyroid level is high.  This indicates that you have too much levothyroxine.  PLAN: I recommend cutting back on the levothyroxine to 125 mcg daily.  You are on 137 mcg daily now.  Recheck a TSH in 2 months.  If thyroid is too high it can thin out your bones and cause other symptoms so we would like to get it at the right level.  I will send a new prescription.  VICTORIANO AL MD

## 2022-08-12 ENCOUNTER — LAB (OUTPATIENT)
Dept: LAB | Facility: CLINIC | Age: 27
End: 2022-08-12
Payer: COMMERCIAL

## 2022-08-12 DIAGNOSIS — E03.8 SUBCLINICAL HYPOTHYROIDISM: ICD-10-CM

## 2022-08-12 LAB — TSH SERPL DL<=0.005 MIU/L-ACNC: 1.78 MU/L (ref 0.4–4)

## 2022-08-12 PROCEDURE — 36415 COLL VENOUS BLD VENIPUNCTURE: CPT

## 2022-08-12 PROCEDURE — 84443 ASSAY THYROID STIM HORMONE: CPT

## 2022-08-22 ENCOUNTER — E-VISIT (OUTPATIENT)
Dept: FAMILY MEDICINE | Facility: CLINIC | Age: 27
End: 2022-08-22
Payer: COMMERCIAL

## 2022-08-22 DIAGNOSIS — E03.8 SUBCLINICAL HYPOTHYROIDISM: ICD-10-CM

## 2022-08-22 DIAGNOSIS — N89.8 VAGINAL ITCHING: Primary | ICD-10-CM

## 2022-08-22 PROCEDURE — 99421 OL DIG E/M SVC 5-10 MIN: CPT | Performed by: NURSE PRACTITIONER

## 2022-08-23 ENCOUNTER — LAB (OUTPATIENT)
Dept: LAB | Facility: CLINIC | Age: 27
End: 2022-08-23

## 2022-08-23 ENCOUNTER — MYC MEDICAL ADVICE (OUTPATIENT)
Dept: FAMILY MEDICINE | Facility: CLINIC | Age: 27
End: 2022-08-23

## 2022-08-23 DIAGNOSIS — B37.31 CANDIDIASIS OF VAGINA: Primary | ICD-10-CM

## 2022-08-23 DIAGNOSIS — E03.8 SUBCLINICAL HYPOTHYROIDISM: ICD-10-CM

## 2022-08-23 DIAGNOSIS — N76.0 BV (BACTERIAL VAGINOSIS): ICD-10-CM

## 2022-08-23 DIAGNOSIS — B96.89 BV (BACTERIAL VAGINOSIS): ICD-10-CM

## 2022-08-23 LAB
CLUE CELLS: PRESENT
TRICHOMONAS, WET PREP: ABNORMAL
WBC'S/HIGH POWER FIELD, WET PREP: ABNORMAL
YEAST, WET PREP: PRESENT

## 2022-08-23 PROCEDURE — 87210 SMEAR WET MOUNT SALINE/INK: CPT | Performed by: NURSE PRACTITIONER

## 2022-08-23 RX ORDER — LEVOTHYROXINE SODIUM 125 UG/1
125 TABLET ORAL DAILY
Qty: 90 TABLET | Refills: 3 | Status: SHIPPED | OUTPATIENT
Start: 2022-08-23 | End: 2023-08-22

## 2022-08-23 RX ORDER — METRONIDAZOLE 500 MG/1
500 TABLET ORAL 2 TIMES DAILY
Qty: 14 TABLET | Refills: 0 | Status: SHIPPED | OUTPATIENT
Start: 2022-08-23 | End: 2022-08-30

## 2022-08-23 RX ORDER — FLUCONAZOLE 150 MG/1
150 TABLET ORAL ONCE
Qty: 1 TABLET | Refills: 0 | Status: SHIPPED | OUTPATIENT
Start: 2022-08-23 | End: 2022-08-23

## 2022-08-23 NOTE — PATIENT INSTRUCTIONS
Thank you for choosing us for your care. Given your symptoms, I would like you to do a lab-only visit to determine what is causing them.  I have placed the orders.  Please schedule an appointment with the lab right here in [facility], or call [phone].  I will let you know when the results are back and next steps to take.    As far as your thyroid medication, you had a decrease in dosage around June, we need a updated lab to determine any changes in dosing.  Please schedule this at the same time as the wet prep and will put in further refills at that time.

## 2022-11-19 ENCOUNTER — HEALTH MAINTENANCE LETTER (OUTPATIENT)
Age: 27
End: 2022-11-19

## 2022-11-28 ENCOUNTER — E-VISIT (OUTPATIENT)
Dept: URGENT CARE | Facility: URGENT CARE | Age: 27
End: 2022-11-28
Payer: COMMERCIAL

## 2022-11-28 ENCOUNTER — OFFICE VISIT (OUTPATIENT)
Dept: URGENT CARE | Facility: URGENT CARE | Age: 27
End: 2022-11-28
Payer: COMMERCIAL

## 2022-11-28 VITALS
WEIGHT: 237 LBS | BODY MASS INDEX: 36.04 KG/M2 | TEMPERATURE: 101.3 F | SYSTOLIC BLOOD PRESSURE: 146 MMHG | OXYGEN SATURATION: 99 % | DIASTOLIC BLOOD PRESSURE: 71 MMHG | HEART RATE: 104 BPM

## 2022-11-28 DIAGNOSIS — J10.1 INFLUENZA A: ICD-10-CM

## 2022-11-28 DIAGNOSIS — Z20.822 EXPOSURE TO 2019 NOVEL CORONAVIRUS: ICD-10-CM

## 2022-11-28 DIAGNOSIS — R50.9 FEVER, UNSPECIFIED FEVER CAUSE: Primary | ICD-10-CM

## 2022-11-28 DIAGNOSIS — R42 DIZZINESS: Primary | ICD-10-CM

## 2022-11-28 LAB
FLUAV AG SPEC QL IA: POSITIVE
FLUBV AG SPEC QL IA: NEGATIVE

## 2022-11-28 PROCEDURE — 99213 OFFICE O/P EST LOW 20 MIN: CPT | Mod: CS | Performed by: PHYSICIAN ASSISTANT

## 2022-11-28 PROCEDURE — 87804 INFLUENZA ASSAY W/OPTIC: CPT | Performed by: PHYSICIAN ASSISTANT

## 2022-11-28 PROCEDURE — U0003 INFECTIOUS AGENT DETECTION BY NUCLEIC ACID (DNA OR RNA); SEVERE ACUTE RESPIRATORY SYNDROME CORONAVIRUS 2 (SARS-COV-2) (CORONAVIRUS DISEASE [COVID-19]), AMPLIFIED PROBE TECHNIQUE, MAKING USE OF HIGH THROUGHPUT TECHNOLOGIES AS DESCRIBED BY CMS-2020-01-R: HCPCS | Performed by: PHYSICIAN ASSISTANT

## 2022-11-28 PROCEDURE — U0005 INFEC AGEN DETEC AMPLI PROBE: HCPCS | Performed by: PHYSICIAN ASSISTANT

## 2022-11-28 ASSESSMENT — ENCOUNTER SYMPTOMS
SINUS PRESSURE: 1
COUGH: 1
HEADACHES: 1
FEVER: 1

## 2022-11-28 NOTE — PROGRESS NOTES
"SUBJECTIVE:   Dalila Galvez is a 27 year old female presenting with a chief complaint of   Chief Complaint   Patient presents with     Ear Problem     Sinus Problem     For about 2 wks has ear pain in both ears, and sinus pressure.  Took ibuprofen at 5:30 am.         She is an established patient of [facility]. Complains of ear and sinus pain. Symptoms began days ago but seemed to improve, however they worsened 2 days ago. Endorses headache, cough, fevers, and congestion. Pt also describes a \"fainting\" episode at 3AM this morning. Fell to the ground after getting up from bed and walking into the living room. No LOC. Did not hit her head. Stood up immediately after and sat on a chair. No hx of similar. Denies sore throat, SOB, and abdominal pain. Treating with Ibuprofen, last taken 5:30AM. Multiple sick contacts in the home.       Review of Systems   Constitutional: Positive for fever.   HENT: Positive for congestion, ear pain and sinus pressure.    Respiratory: Positive for cough.    Neurological: Positive for headaches.   All other systems reviewed and are negative.      Past Medical History:   Diagnosis Date     Chickenpox      Dysmenorrhea 11/20/2020    Added automatically from request for surgery [id number]     Excessive bleeding in premenopausal period 11/20/2020    Added automatically from request for surgery [id number]     PONV (postoperative nausea and vomiting)      Family History   Problem Relation Age of Onset     Heart Disease Maternal Grandfather      Coronary Artery Disease Paternal Grandfather         MI     Cancer Paternal Aunt         uterine and ovary     Current Outpatient Medications   Medication Sig Dispense Refill     levothyroxine (SYNTHROID/LEVOTHROID) 125 MCG tablet Take 1 tablet (125 mcg) by mouth daily 90 tablet 3     Social History     Tobacco Use     Smoking status: Never     Smokeless tobacco: Never   Substance Use Topics     Alcohol use: Yes     Comment: rare-quit with pregnancy "       OBJECTIVE  BP (!) 146/71   Pulse 104   Temp (!) 101.3  F (38.5  C) (Tympanic)   Wt 107.5 kg (237 lb)   LMP 02/14/2020   SpO2 99%   BMI 36.04 kg/m      Physical Exam  Vitals and nursing note reviewed.   Constitutional:       General: She is not in acute distress.     Appearance: Normal appearance. She is obese.   HENT:      Head: Normocephalic.      Right Ear: Tympanic membrane, ear canal and external ear normal.      Left Ear: Tympanic membrane, ear canal and external ear normal.      Nose: Congestion and rhinorrhea present.      Mouth/Throat:      Mouth: Mucous membranes are moist.      Pharynx: Oropharynx is clear.   Eyes:      Extraocular Movements: Extraocular movements intact.      Conjunctiva/sclera: Conjunctivae normal.      Pupils: Pupils are equal, round, and reactive to light.   Cardiovascular:      Rate and Rhythm: Normal rate and regular rhythm.      Heart sounds: Normal heart sounds.   Pulmonary:      Breath sounds: Normal breath sounds.   Musculoskeletal:         General: Normal range of motion.      Cervical back: Normal range of motion.   Skin:     General: Skin is warm and dry.   Neurological:      General: No focal deficit present.      Mental Status: She is alert.   Psychiatric:         Mood and Affect: Mood normal.         Behavior: Behavior normal.         Labs:  Results for orders placed or performed in visit on 11/28/22 (from the past 24 hour(s))   Influenza A & B Antigen - Clinic Collect    Specimen: Nose; Swab   Result Value Ref Range    Influenza A antigen Positive (A) Negative    Influenza B antigen Negative Negative    Narrative    Test results must be correlated with clinical data. If necessary, results should be confirmed by a molecular assay or viral culture.       X-Ray was not done.    ASSESSMENT:      ICD-10-CM    1. Fever, unspecified fever cause  R50.9 Influenza A & B Antigen - Clinic Collect      2. Exposure to 2019 novel coronavirus  Z20.822 Symptomatic; Unknown  COVID-19 Virus (Coronavirus) by PCR Nose      3. Influenza A  J10.1            Medical Decision Making:    Differential Diagnosis:  URI Adult/Peds:  Influenza, Sinusitis, Strep pharyngitis, Viral pharyngitis, Viral syndrome and Viral upper respiratory illness    Serious Comorbid Conditions:  Adult:  reviewed    PLAN:    URI Adult:  Tylenol, Ibuprofen, Fluids and Rest. Discussed Tamiflu and why she does not qualify. Be seen again if symptoms worsen or change .  Sounds to be a vasovagal episode.  Discussed hydration and rest.  Recommended slowly going from sitting to standing.      Followup:    If not improving or if condition worsens, follow up with your Primary Care Provider    There are no Patient Instructions on file for this visit.

## 2022-11-28 NOTE — PATIENT INSTRUCTIONS
Dear Dalila Galvez,    We are sorry you are not feeling well. Based on the responses you provided, it is recommended that you be seen in-person in urgent care so we can better evaluate your symptoms due to fainting and dizziness. Please click here to find the nearest urgent care location to you.   You will not be charged for this Visit. Thank you for trusting us with your care.    AP Lynch CNP

## 2022-11-29 LAB — SARS-COV-2 RNA RESP QL NAA+PROBE: NEGATIVE

## 2023-03-23 ASSESSMENT — ENCOUNTER SYMPTOMS
SHORTNESS OF BREATH: 0
HEARTBURN: 0
DYSURIA: 0
PALPITATIONS: 0
HEMATURIA: 0
EYE PAIN: 0
MYALGIAS: 0
FREQUENCY: 0
ABDOMINAL PAIN: 0
HEADACHES: 0
DIZZINESS: 0
PARESTHESIAS: 0
FEVER: 0
ARTHRALGIAS: 0
SORE THROAT: 0
JOINT SWELLING: 0
CHILLS: 0
CONSTIPATION: 0
COUGH: 0
HEMATOCHEZIA: 0
DIARRHEA: 0
WEAKNESS: 0
BREAST MASS: 0
NAUSEA: 0
NERVOUS/ANXIOUS: 0

## 2023-03-24 ENCOUNTER — OFFICE VISIT (OUTPATIENT)
Dept: FAMILY MEDICINE | Facility: CLINIC | Age: 28
End: 2023-03-24
Payer: COMMERCIAL

## 2023-03-24 VITALS
BODY MASS INDEX: 34.77 KG/M2 | OXYGEN SATURATION: 97 % | SYSTOLIC BLOOD PRESSURE: 109 MMHG | TEMPERATURE: 97.9 F | HEART RATE: 71 BPM | RESPIRATION RATE: 18 BRPM | WEIGHT: 229.4 LBS | DIASTOLIC BLOOD PRESSURE: 68 MMHG | HEIGHT: 68 IN

## 2023-03-24 DIAGNOSIS — E66.01 MORBID OBESITY (H): ICD-10-CM

## 2023-03-24 DIAGNOSIS — Z00.00 ROUTINE GENERAL MEDICAL EXAMINATION AT A HEALTH CARE FACILITY: Primary | ICD-10-CM

## 2023-03-24 DIAGNOSIS — E03.8 SUBCLINICAL HYPOTHYROIDISM: ICD-10-CM

## 2023-03-24 PROCEDURE — 99395 PREV VISIT EST AGE 18-39: CPT | Performed by: NURSE PRACTITIONER

## 2023-03-24 RX ORDER — LEVOTHYROXINE SODIUM 125 UG/1
125 TABLET ORAL DAILY
Qty: 90 TABLET | Refills: 3 | Status: CANCELLED | OUTPATIENT
Start: 2023-03-24

## 2023-03-24 ASSESSMENT — ENCOUNTER SYMPTOMS
HEARTBURN: 0
NAUSEA: 0
WEAKNESS: 0
ARTHRALGIAS: 0
SORE THROAT: 0
SHORTNESS OF BREATH: 0
DYSURIA: 0
HEADACHES: 0
JOINT SWELLING: 0
PARESTHESIAS: 0
DIARRHEA: 0
ABDOMINAL PAIN: 0
PALPITATIONS: 0
NERVOUS/ANXIOUS: 0
CHILLS: 0
MYALGIAS: 0
FEVER: 0
HEMATURIA: 0
BREAST MASS: 0
FREQUENCY: 0
COUGH: 0
DIZZINESS: 0
HEMATOCHEZIA: 0
CONSTIPATION: 0
EYE PAIN: 0

## 2023-03-24 ASSESSMENT — PAIN SCALES - GENERAL: PAINLEVEL: NO PAIN (0)

## 2023-03-24 NOTE — PROGRESS NOTES
SUBJECTIVE:   CC: Dalila is an 27 year old who presents for preventive health visit.   No flowsheet data found.  Patient has been advised of split billing requirements and indicates understanding: Yes  Healthy Habits:     Getting at least 3 servings of Calcium per day:  Yes    Bi-annual eye exam:  Yes    Dental care twice a year:  Yes    Sleep apnea or symptoms of sleep apnea:  None    Diet:  Regular (no restrictions)    Frequency of exercise:  4-5 days/week    Duration of exercise:  15-30 minutes    Taking medications regularly:  Yes    Medication side effects:  None    PHQ-2 Total Score: 0    Additional concerns today:  No      Hypothyroidism Follow-up      Since last visit, patient describes the following symptoms: Weight stable, no hair loss, no skin changes, no constipation, no loose stools    How many days per week do you miss taking your medication? 0        Today's PHQ-2 Score:       3/23/2023    10:28 AM   PHQ-2 ( 1999 [employer])   Q1: Little interest or pleasure in doing things 0   Q2: Feeling down, depressed or hopeless 0   PHQ-2 Score 0   Q1: Little interest or pleasure in doing things Not at all    Not at all   Q2: Feeling down, depressed or hopeless Not at all    Not at all   PHQ-2 Score 0    0           Social History     Tobacco Use     Smoking status: Never     Smokeless tobacco: Never   Vaping Use     Vaping status: Never Used   Substance Use Topics     Alcohol use: Yes     Comment: rare-quit with pregnancy             3/23/2023    10:28 AM   Alcohol Use   Prescreen: >3 drinks/day or >7 drinks/week? No          View : No data to display.              Reviewed orders with patient.  Reviewed health maintenance and updated orders accordingly - Yes  Labs reviewed in EPIC  BP Readings from Last 3 Encounters:   03/24/23 109/68   11/28/22 (!) 146/71   02/14/22 102/78    Wt Readings from Last 3 Encounters:   03/24/23 104.1 kg (229 lb 6.4 oz)   11/28/22 107.5 kg (237 lb)   02/14/22 121.6 kg (268 lb)                   Patient Active Problem List   Diagnosis     BMI 39.0-39.9,adult     Cyst of subcutaneous tissue     Morbid obesity (H)     Subclinical hypothyroidism     Past Surgical History:   Procedure Laterality Date      SECTION N/A 2019    Procedure:  SECTION;  Surgeon: Francis Somers MD;  Location: [facility]      SECTION, TUBAL LIGATION, COMBINED Bilateral 2020    Procedure: REPEAT  SECTION;  Surgeon: Lesa Courtney MD;  Location: [facility]     GYN SURGERY       HYSTERECTOMY, PAP NO LONGER INDICATED  2021    dysmenorrhea, menorrhagia     LAPAROSCOPIC HYSTERECTOMY TOTAL N/A 2021    Procedure: total laparoscopic hysterectomy, Bilateral Salpingectomy;  Surgeon: Maida Fair MD;  Location: [facility]       Social History     Tobacco Use     Smoking status: Never     Smokeless tobacco: Never   Vaping Use     Vaping status: Never Used   Substance Use Topics     Alcohol use: Yes     Comment: rare-quit with pregnancy     Family History   Problem Relation Age of Onset     Heart Disease Maternal Grandfather      Coronary Artery Disease Paternal Grandfather         MI     Cancer Paternal Aunt         uterine and ovary         Current Outpatient Medications   Medication Sig Dispense Refill     levothyroxine (SYNTHROID/LEVOTHROID) 125 MCG tablet Take 1 tablet (125 mcg) by mouth daily 90 tablet 3     No Known Allergies    Breast Cancer Screenin/28/2021     8:05 AM   Breast CA Risk Assessment (FHS-7)   Do you have a family history of breast, colon, or ovarian cancer? No / Unknown         Patient under 40 years of age: Routine Mammogram Screening not recommended.   Pertinent mammograms are reviewed under the imaging tab.    History of abnormal Pap smear: NO - age 21-29 PAP every 3 years recommended      Latest Ref Rng & Units 2018     9:54 AM 2018     9:45 AM   PAP / HPV   PAP (Historical)  NIL      HPV 16 DNA NEG^Negative  Negative     HPV 18  DNA NEG^Negative  Negative     Other HR HPV NEG^Negative  Negative       Reviewed and updated as needed this visit by clinical staff   Tobacco  Allergies  Meds  Problems  Med Hx  Surg Hx  Fam Hx          Reviewed and updated as needed this visit by Provider   Tobacco  Allergies  Meds  Problems  Med Hx  Surg Hx  Fam Hx         Past Medical History:   Diagnosis Date     Chickenpox      Dysmenorrhea 2020    Added automatically from request for surgery [id number]     Excessive bleeding in premenopausal period 2020    Added automatically from request for surgery [id number]     PONV (postoperative nausea and vomiting)       Past Surgical History:   Procedure Laterality Date      SECTION N/A 2019    Procedure:  SECTION;  Surgeon: Francis Somers MD;  Location: [facility]      SECTION, TUBAL LIGATION, COMBINED Bilateral 2020    Procedure: REPEAT  SECTION;  Surgeon: Lesa Courtney MD;  Location: [facility]     GYN SURGERY       HYSTERECTOMY, PAP NO LONGER INDICATED  2021    dysmenorrhea, menorrhagia     LAPAROSCOPIC HYSTERECTOMY TOTAL N/A 2021    Procedure: total laparoscopic hysterectomy, Bilateral Salpingectomy;  Surgeon: Maida Fair MD;  Location: [facility]     OB History    Para Term  AB Living   2 2 2 0 0 2   SAB IAB Ectopic Multiple Live Births   0 0 0 0 2      # Outcome Date GA Lbr Benigno/2nd Weight Sex Delivery Anes PTL Lv   2 Term 20 38w5d  3.74 kg (8 lb 3.9 oz) M   N ANÍBAL      Name: HELADIO PATTON      Apgar1: 7  Apgar5: 9   1 Term 19 37w5d  3.289 kg (7 lb 4 oz) M CS-LTranv EPI N ANÍBAL      Birth Comments: NP in attendance at delivery for failure to progress and category 2 tracing with fetal tachycardia and minimal variability. ROM for >24hrs. Infant was placed on maternal abdomen for delayed cord clamping. Cried immediately. Brought to warmer and was dried/stimulated. Initially lung sounds coarse. Apgars  "7/9. Lungs cleared quickly. At ~15 minutes of life infant became tachycardic to 200 (previously 160's) and began grunting intermittently. RR 80's. O2 sats within goal range throughout. CPAP done for ~15 minutes. Delee suctioned x2 for ~2mL thick clear secretions. HR improved to 150-180's. Continued to grunt intermittently. Brought back to special care nursery for further care.       Complications: Cephalopelvic Disproportion, Failure to Progress in First Stage, Cholestasis      Name: Elias      Apgar1: 7  Apgar5: 9       Review of Systems   Constitutional: Negative for chills and fever.   HENT: Negative for congestion, ear pain, hearing loss and sore throat.    Eyes: Negative for pain.   Respiratory: Negative for cough and shortness of breath.    Cardiovascular: Negative for chest pain, palpitations and peripheral edema.   Gastrointestinal: Negative for abdominal pain, constipation, diarrhea, heartburn, hematochezia and nausea.   Breasts:  Negative for tenderness, breast mass and discharge.   Genitourinary: Negative for dysuria, frequency, genital sores, hematuria, pelvic pain, urgency, vaginal bleeding and vaginal discharge.   Musculoskeletal: Negative for arthralgias, joint swelling and myalgias.   Skin: Negative for rash.   Neurological: Negative for dizziness, weakness, headaches and paresthesias.   Psychiatric/Behavioral: Negative for mood changes. The patient is not nervous/anxious.         OBJECTIVE:   /68   Pulse 71   Temp 97.9  F (36.6  C)   Resp 18   Ht 1.721 m (5' 7.75\")   Wt 104.1 kg (229 lb 6.4 oz)   LMP 02/14/2020   SpO2 97%   Breastfeeding No   BMI 35.14 kg/m    Physical Exam  GENERAL: healthy, alert and no distress  EYES: Eyes grossly normal to inspection, PERRL and conjunctivae and sclerae normal  HENT: ear canals and TM's normal, nose and mouth without ulcers or lesions  NECK: no adenopathy, no asymmetry, masses, or scars and thyroid normal to palpation  RESP: lungs clear to " auscultation - no rales, rhonchi or wheezes  BREAST: normal without masses, tenderness or nipple discharge and no palpable axillary masses or adenopathy  CV: regular rate and rhythm, normal S1 S2, no S3 or S4, no murmur, click or rub, no peripheral edema and peripheral pulses strong  ABDOMEN: soft, nontender, no hepatosplenomegaly, no masses and bowel sounds normal   (female): normal female external genitalia, normal urethral meatus, vaginal mucosa pink, moist, well rugated, and normal cervix/adnexa/uterus without masses or discharge  MS: no gross musculoskeletal defects noted, no edema  SKIN: no suspicious lesions or rashes  NEURO: Normal strength and tone, mentation intact and speech normal  PSYCH: mentation appears normal, affect normal/bright    Diagnostic Test Results:  Labs reviewed in Epic    ASSESSMENT/PLAN:   (Z00.00) Routine general medical examination at a health care facility  (primary encounter diagnosis)  Comment: Generally healthy 27-year-old female in for annual preventative examination.  Chronic conditions as below.    (E03.8) Subclinical hypothyroidism  Comment: Chronic, last TSH in August 2022 within normal limits after dosage adjustment prior to that.  Continue current dosage  Schedule lab only appointment in August for recheck of thyroid hormone  Plan: TSH with free T4 reflex          (E66.01) Morbid obesity (H)  Comment: Chronic, discussed diet and exercise.        Patient has been advised of split billing requirements and indicates understanding: Yes      COUNSELING:  Reviewed preventive health counseling, as reflected in patient instructions  After discussion with patient, patient verbalizes and agreeable to receive AVS instructions via My Chart, not printed today       She reports that she has never smoked. She has never used smokeless tobacco.            Federica Kimbrough, CARLOTA, APRN-CNP   [facility]    Chart documentation with Dragon Voice recognition Software.  Although reviewed after completion, some words and grammatical errors may remain.

## 2023-03-24 NOTE — PATIENT INSTRUCTIONS
(E03.8) Subclinical hypothyroidism  Comment: Chronic, last TSH in August 2022 within normal limits after dosage adjustment prior to that.  Continue current dosage  Schedule lab only appointment in August for recheck of thyroid hormone  Plan: TSH with free T4 reflex          (E66.01) Morbid obesity (H)  Comment: Chronic, discussed diet and exercise.        Preventive Health Recommendations  Female Ages 26 - 39  Yearly exam:   See your health care provider every year in order to  Review health changes.   Discuss preventive care.    Review your medicines if you your doctor has prescribed any.    Until age 30: Get a Pap test every three years (more often if you have had an abnormal result).    After age 30: Talk to your doctor about whether you should have a Pap test every 3 years or have a Pap test with HPV screening every 5 years.   You do not need a Pap test if your uterus was removed (hysterectomy) and you have not had cancer.  You should be tested each year for STDs (sexually transmitted diseases), if you're at risk.   Talk to your provider about how often to have your cholesterol checked.  If you are at risk for diabetes, you should have a diabetes test (fasting glucose).  Shots: Get a flu shot each year. Get a tetanus shot every 10 years.   Nutrition:   Eat at least 5 servings of fruits and vegetables each day.  Eat whole-grain bread, whole-wheat pasta and brown rice instead of white grains and rice.  Get adequate Calcium and Vitamin D.     Lifestyle  Exercise at least 150 minutes a week (30 minutes a day, 5 days of the week). This will help you control your weight and prevent disease.  Limit alcohol to one drink per day.  No smoking.   Wear sunscreen to prevent skin cancer.  See your dentist every six months for an exam and cleaning.

## 2023-08-16 ENCOUNTER — MYC MEDICAL ADVICE (OUTPATIENT)
Dept: FAMILY MEDICINE | Facility: CLINIC | Age: 28
End: 2023-08-16
Payer: COMMERCIAL

## 2023-08-16 ENCOUNTER — E-VISIT (OUTPATIENT)
Dept: FAMILY MEDICINE | Facility: CLINIC | Age: 28
End: 2023-08-16
Payer: COMMERCIAL

## 2023-08-16 DIAGNOSIS — N94.9 VAGINAL DISCOMFORT: Primary | ICD-10-CM

## 2023-08-16 PROCEDURE — 99421 OL DIG E/M SVC 5-10 MIN: CPT | Performed by: NURSE PRACTITIONER

## 2023-08-16 NOTE — PATIENT INSTRUCTIONS
Thank you for choosing us for your care. Given your symptoms, I would like you to do a lab-only visit to determine what is causing them.  I have placed the orders.  Please schedule an appointment with the lab right here in [facility], or call [phone].  I will let you know when the results are back and next steps to take.

## 2023-08-17 ENCOUNTER — LAB (OUTPATIENT)
Dept: LAB | Facility: CLINIC | Age: 28
End: 2023-08-17
Payer: COMMERCIAL

## 2023-08-17 DIAGNOSIS — E03.8 SUBCLINICAL HYPOTHYROIDISM: ICD-10-CM

## 2023-08-17 DIAGNOSIS — N94.9 VAGINAL DISCOMFORT: ICD-10-CM

## 2023-08-17 LAB
ALBUMIN UR-MCNC: ABNORMAL MG/DL
APPEARANCE UR: CLEAR
BACTERIA #/AREA URNS HPF: ABNORMAL /HPF
BILIRUB UR QL STRIP: NEGATIVE
CLUE CELLS: ABNORMAL
COLOR UR AUTO: YELLOW
GLUCOSE UR STRIP-MCNC: NEGATIVE MG/DL
HGB UR QL STRIP: NEGATIVE
KETONES UR STRIP-MCNC: ABNORMAL MG/DL
LEUKOCYTE ESTERASE UR QL STRIP: ABNORMAL
MUCOUS THREADS #/AREA URNS LPF: PRESENT /LPF
NITRATE UR QL: NEGATIVE
PH UR STRIP: 6 [PH] (ref 5–7)
RBC #/AREA URNS AUTO: ABNORMAL /HPF
SP GR UR STRIP: 1.02 (ref 1–1.03)
SQUAMOUS #/AREA URNS AUTO: ABNORMAL /LPF
TRICHOMONAS, WET PREP: ABNORMAL
TSH SERPL DL<=0.005 MIU/L-ACNC: 0.67 UIU/ML (ref 0.3–4.2)
UROBILINOGEN UR STRIP-ACNC: 0.2 E.U./DL
WBC #/AREA URNS AUTO: ABNORMAL /HPF
WBC'S/HIGH POWER FIELD, WET PREP: ABNORMAL
YEAST, WET PREP: ABNORMAL

## 2023-08-17 PROCEDURE — 84443 ASSAY THYROID STIM HORMONE: CPT

## 2023-08-17 PROCEDURE — 36415 COLL VENOUS BLD VENIPUNCTURE: CPT

## 2023-08-17 PROCEDURE — 87210 SMEAR WET MOUNT SALINE/INK: CPT

## 2023-08-17 PROCEDURE — 81001 URINALYSIS AUTO W/SCOPE: CPT

## 2023-08-21 ENCOUNTER — MYC MEDICAL ADVICE (OUTPATIENT)
Dept: FAMILY MEDICINE | Facility: CLINIC | Age: 28
End: 2023-08-21
Payer: COMMERCIAL

## 2023-08-22 DIAGNOSIS — E03.8 SUBCLINICAL HYPOTHYROIDISM: ICD-10-CM

## 2023-08-22 RX ORDER — LEVOTHYROXINE SODIUM 125 UG/1
125 TABLET ORAL DAILY
Qty: 90 TABLET | Refills: 3 | Status: SHIPPED | OUTPATIENT
Start: 2023-08-22 | End: 2024-08-27

## 2023-12-18 ENCOUNTER — OFFICE VISIT (OUTPATIENT)
Dept: URGENT CARE | Facility: URGENT CARE | Age: 28
End: 2023-12-18
Payer: COMMERCIAL

## 2023-12-18 VITALS
SYSTOLIC BLOOD PRESSURE: 128 MMHG | WEIGHT: 220 LBS | DIASTOLIC BLOOD PRESSURE: 75 MMHG | RESPIRATION RATE: 18 BRPM | TEMPERATURE: 98.6 F | OXYGEN SATURATION: 99 % | HEART RATE: 67 BPM | BODY MASS INDEX: 33.7 KG/M2

## 2023-12-18 DIAGNOSIS — R07.0 THROAT PAIN: Primary | ICD-10-CM

## 2023-12-18 LAB
DEPRECATED S PYO AG THROAT QL EIA: NEGATIVE
GROUP A STREP BY PCR: NOT DETECTED

## 2023-12-18 PROCEDURE — 87651 STREP A DNA AMP PROBE: CPT | Performed by: PHYSICIAN ASSISTANT

## 2023-12-18 PROCEDURE — 99213 OFFICE O/P EST LOW 20 MIN: CPT | Performed by: PHYSICIAN ASSISTANT

## 2023-12-19 NOTE — PROGRESS NOTES
"  Assessment & Plan     Throat pain  Rapid strep negative, PCR pending. This is likely viral. Continue with supportive care. Get plenty of rest and push fluids. Can use Tylenol and/or ibuprofen as needed for pain and/or fever control. Discussed return to school/work guidelines. Return to clinic if symptoms worsen or do not improve; otherwise follow up as needed     - Streptococcus A Rapid Screen w/Reflex to PCR  - Group A Streptococcus PCR Throat Swab             BMI:   Estimated body mass index is 33.7 kg/m  as calculated from the following:    Height as of 3/24/23: 1.721 m (5' 7.75\").    Weight as of this encounter: 99.8 kg (220 lb).           Return in about 1 week (around 12/25/2023), or if symptoms worsen or fail to improve.    Yolette Browning PA-C  [facility] [place]              Subjective   Chief Complaint   Patient presents with    Pharyngitis     Sore throat started yesterday, today noticed white spots, says it feels like something is in her throat.        HPI     URI Adult    Onset of symptoms was 1 day(s) ago.  Course of illness is same.    Severity moderate  Current and Associated symptoms: sore throat   Treatment measures tried include Tylenol/Ibuprofen.  Predisposing factors include None.                  Review of Systems         Objective    /75   Pulse 67   Temp 98.6  F (37  C) (Tympanic)   Resp 18   Wt 99.8 kg (220 lb)   LMP 02/14/2020   SpO2 99%   BMI 33.70 kg/m    Body mass index is 33.7 kg/m .  Physical Exam  Constitutional:       Appearance: She is well-developed.   HENT:      Head: Normocephalic.      Right Ear: Tympanic membrane and ear canal normal.      Left Ear: Tympanic membrane and ear canal normal.      Mouth/Throat:      Pharynx: Posterior oropharyngeal erythema present.   Eyes:      Conjunctiva/sclera: Conjunctivae normal.   Cardiovascular:      Rate and Rhythm: Normal rate.      Heart sounds: Normal heart sounds.   Pulmonary:      Effort: " Pulmonary effort is normal.      Breath sounds: Normal breath sounds.   Skin:     General: Skin is warm and dry.      Findings: No rash.   Psychiatric:         Behavior: Behavior normal.            No results found for this or any previous visit (from the past 24 hour(s)).

## 2024-02-07 ENCOUNTER — DOCUMENTATION ONLY (OUTPATIENT)
Dept: FAMILY MEDICINE | Facility: CLINIC | Age: 29
End: 2024-02-07
Payer: COMMERCIAL

## 2024-02-07 NOTE — PROGRESS NOTES
Dalila Irma Galvez has an upcoming lab appointment:    Future Appointments   Date Time Provider Department Center   2/20/2024  7:45 AM [facility]     Patient is scheduled for the following lab(s): annual exam/thyroid test    There is no order available. Please review and place either future orders or HMPO (Review of Health Maintenance Protocol Orders), as appropriate.    There are no preventive care reminders to display for this patient.  Ophelia Ortiz

## 2024-03-09 ENCOUNTER — E-VISIT (OUTPATIENT)
Dept: URGENT CARE | Facility: CLINIC | Age: 29
End: 2024-03-09
Payer: COMMERCIAL

## 2024-03-09 DIAGNOSIS — H10.33 ACUTE BACTERIAL CONJUNCTIVITIS OF BOTH EYES: Primary | ICD-10-CM

## 2024-03-09 PROCEDURE — 99421 OL DIG E/M SVC 5-10 MIN: CPT | Performed by: NURSE PRACTITIONER

## 2024-03-09 RX ORDER — POLYMYXIN B SULFATE AND TRIMETHOPRIM 1; 10000 MG/ML; [USP'U]/ML
SOLUTION OPHTHALMIC
Qty: 10 ML | Refills: 0 | Status: SHIPPED | OUTPATIENT
Start: 2024-03-09 | End: 2024-03-16

## 2024-03-09 NOTE — PATIENT INSTRUCTIONS
Thank you for choosing us for your care. I have placed an order for a prescription so that you can start treatment. View your full visit summary for details by clicking on the link below. Your pharmacist will able to address any questions you may have about the medication.     If you re not feeling better within 2-3 days, please schedule an appointment.  You can schedule an appointment right here in [facility], or call [phone]  If the visit is for the same symptoms as your eVisit, we ll refund the cost of your eVisit if seen within seven days.    Pinkeye: Care Instructions  Overview     Pinkeye is redness and swelling of the eye surface and the conjunctiva (the lining of the eyelid and the covering of the white part of the eye). Pinkeye is also called conjunctivitis. Pinkeye is often caused by infection with bacteria or a virus. Dry air, allergies, smoke, and chemicals are other common causes.  Pinkeye often gets better on its own in 7 to 10 days. Antibiotics only help if the pinkeye is caused by bacteria. Pinkeye caused by infection spreads easily. If an allergy or chemical is causing pinkeye, it will not go away unless you can avoid whatever is causing it.  Follow-up care is a key part of your treatment and safety. Be sure to make and go to all appointments, and call your doctor if you are having problems. It's also a good idea to know your test results and keep a list of the medicines you take.  How can you care for yourself at home?  Wash your hands often. Always wash them before and after you treat pinkeye or touch your eyes or face.  Use moist cotton or a clean, wet cloth to remove crust. Wipe from the inside corner of the eye to the outside. Use a clean part of the cloth for each wipe.  Put cold or warm wet cloths on your eye a few times a day if the eye hurts.  Do not wear contact lenses or eye makeup until the pinkeye is gone. Throw away any eye makeup you were using when you got pinkeye. Clean your  "contacts and storage case. If you wear disposable contacts, use a new pair when your eye has cleared and it is safe to wear contacts again.  If the doctor gave you antibiotic ointment or eyedrops, use them as directed. Use the medicine for as long as instructed, even if your eye starts looking better soon. Keep the bottle tip clean, and do not let it touch the eye area.  To put in eyedrops or ointment:  Tilt your head back, and pull your lower eyelid down with one finger.  Drop or squirt the medicine inside the lower lid.  Close your eye for 30 to 60 seconds to let the drops or ointment move around.  Do not touch the ointment or dropper tip to your eyelashes or any other surface.  Do not share towels, pillows, or washcloths while you have pinkeye.  When should you call for help?   Call your doctor now or seek immediate medical care if:    You have pain in your eye, not just irritation on the surface.     You have a change in vision or loss of vision.     You have an increase in discharge from the eye.     Your eye has not started to improve or begins to get worse within 48 hours after you start using antibiotics.     Pinkeye lasts longer than 7 days.   Watch closely for changes in your health, and be sure to contact your doctor if you have any problems.  Where can you learn more?  Go to https://www.[employer].net/patiented  Enter Y392 in the search box to learn more about \"Pinkeye: Care Instructions.\"  Current as of: June 6, 2023               Content Version: 13.8    9034-8593 [employer].   Care instructions adapted under license by your healthcare professional. If you have questions about a medical condition or this instruction, always ask your healthcare professional. [employer] disclaims any warranty or liability for your use of this information.      "

## 2024-03-20 ENCOUNTER — OFFICE VISIT (OUTPATIENT)
Dept: FAMILY MEDICINE | Facility: CLINIC | Age: 29
End: 2024-03-20
Payer: COMMERCIAL

## 2024-03-20 ENCOUNTER — ANCILLARY PROCEDURE (OUTPATIENT)
Dept: GENERAL RADIOLOGY | Facility: CLINIC | Age: 29
End: 2024-03-20
Attending: NURSE PRACTITIONER
Payer: COMMERCIAL

## 2024-03-20 VITALS
DIASTOLIC BLOOD PRESSURE: 73 MMHG | HEIGHT: 67 IN | SYSTOLIC BLOOD PRESSURE: 107 MMHG | RESPIRATION RATE: 18 BRPM | OXYGEN SATURATION: 98 % | WEIGHT: 222.2 LBS | HEART RATE: 68 BPM | TEMPERATURE: 97.8 F | BODY MASS INDEX: 34.88 KG/M2

## 2024-03-20 DIAGNOSIS — E03.8 SUBCLINICAL HYPOTHYROIDISM: ICD-10-CM

## 2024-03-20 DIAGNOSIS — M79.645 PAIN OF LEFT MIDDLE FINGER: ICD-10-CM

## 2024-03-20 DIAGNOSIS — J00 ACUTE RHINITIS: ICD-10-CM

## 2024-03-20 DIAGNOSIS — Z00.00 ROUTINE GENERAL MEDICAL EXAMINATION AT A HEALTH CARE FACILITY: Primary | ICD-10-CM

## 2024-03-20 LAB
ANION GAP SERPL CALCULATED.3IONS-SCNC: 10 MMOL/L (ref 7–15)
BUN SERPL-MCNC: 12.8 MG/DL (ref 6–20)
CALCIUM SERPL-MCNC: 9.7 MG/DL (ref 8.6–10)
CHLORIDE SERPL-SCNC: 102 MMOL/L (ref 98–107)
CREAT SERPL-MCNC: 0.91 MG/DL (ref 0.51–0.95)
DEPRECATED HCO3 PLAS-SCNC: 29 MMOL/L (ref 22–29)
EGFRCR SERPLBLD CKD-EPI 2021: 87 ML/MIN/1.73M2
GLUCOSE SERPL-MCNC: 105 MG/DL (ref 70–99)
POTASSIUM SERPL-SCNC: 4.4 MMOL/L (ref 3.4–5.3)
SODIUM SERPL-SCNC: 141 MMOL/L (ref 135–145)
TSH SERPL DL<=0.005 MIU/L-ACNC: 1.47 UIU/ML (ref 0.3–4.2)

## 2024-03-20 PROCEDURE — 36415 COLL VENOUS BLD VENIPUNCTURE: CPT | Performed by: NURSE PRACTITIONER

## 2024-03-20 PROCEDURE — 84443 ASSAY THYROID STIM HORMONE: CPT | Performed by: NURSE PRACTITIONER

## 2024-03-20 PROCEDURE — 99213 OFFICE O/P EST LOW 20 MIN: CPT | Mod: 25 | Performed by: NURSE PRACTITIONER

## 2024-03-20 PROCEDURE — 99395 PREV VISIT EST AGE 18-39: CPT | Performed by: NURSE PRACTITIONER

## 2024-03-20 PROCEDURE — 73140 X-RAY EXAM OF FINGER(S): CPT | Mod: TC | Performed by: RADIOLOGY

## 2024-03-20 PROCEDURE — 80048 BASIC METABOLIC PNL TOTAL CA: CPT | Performed by: NURSE PRACTITIONER

## 2024-03-20 SDOH — HEALTH STABILITY: PHYSICAL HEALTH: ON AVERAGE, HOW MANY DAYS PER WEEK DO YOU ENGAGE IN MODERATE TO STRENUOUS EXERCISE (LIKE A BRISK WALK)?: 5 DAYS

## 2024-03-20 SDOH — HEALTH STABILITY: PHYSICAL HEALTH: ON AVERAGE, HOW MANY MINUTES DO YOU ENGAGE IN EXERCISE AT THIS LEVEL?: 30 MIN

## 2024-03-20 ASSESSMENT — PAIN SCALES - GENERAL: PAINLEVEL: NO PAIN (0)

## 2024-03-20 ASSESSMENT — SOCIAL DETERMINANTS OF HEALTH (SDOH): HOW OFTEN DO YOU GET TOGETHER WITH FRIENDS OR RELATIVES?: ONCE A WEEK

## 2024-03-20 NOTE — PROGRESS NOTES
"Preventive Care Visit  [facility]  Federica Hough DNP, Family Medicine  Mar 20, 2024      Assessment & Plan     Routine general medical examination at a health care facility  Healthy 28-year-old female in for annual preventative examination.  - Basic metabolic panel  (Ca, Cl, CO2, Creat, Gluc, K, Na, BUN); Future    Subclinical hypothyroidism  Chronic, has been stable.  Patient has been feeling more fatigued and lethargic in the last few months however.  Weight has been at a standstill, no gain, but in the last.  Will recheck thyroid hormone and notify patient results and any further recommendations.  Continue current dosage without any changes.  - TSH with free T4 reflex; Future    Acute rhinitis  Acute symptoms over the last week accompanied by conjunctivitis.  Recommend starting over-the-counter Flonase nasal spray, 2 sprays each nostril daily.  Reviewed proper administration instructions with patient.  Also recommend starting over-the-counter antihistamine such as Claritin or Zyrtec once daily.  Would recommend follow-up if symptoms not proving or worsening.    Pain of left middle finger  Increased pain of left middle finger over the course of the last several weeks.  Noting more at night, waking with significant stiffness and pain.  He denies any significant swelling or redness.  Denies any autoimmune family history.  Mild tenderness of proximal interphalangeal joint on exam.  Denies any trauma or injury that she is aware of.  Will obtain baseline x-ray to further evaluate.  In the interim, recommend ice and/or heat, moving through range of motion.  - XR Finger Left G/E 2 Views; Future     Patient has been advised of split billing requirements and indicates understanding: Yes        BMI  Estimated body mass index is 34.54 kg/m  as calculated from the following:    Height as of this encounter: 1.708 m (5' 7.25\").    Weight as of this encounter: 100.8 kg (222 lb 3.2 oz).   Weight " "management plan: Discussed healthy diet and exercise guidelines    Counseling  Appropriate preventive services were discussed with this patient, including applicable screening as appropriate for fall prevention, nutrition, physical activity, Tobacco-use cessation, weight loss and cognition.  Checklist reviewing preventive services available has been given to the patient.  Reviewed patient's diet, addressing concerns and/or questions.       See Patient Instructions After discussion with patient, patient verbalizes and agreeable to receive AVS instructions via My Chart, not printed today     Subjective   Dalila is a 28 year old, presenting for the following:  Physical, Ear Pressure (popping), and Eye Problem (Has been treated for \"pink eye\")        3/20/2024     7:34 AM   Additional Questions   Roomed by Ashely        Health Care Directive  Patient does not have a Health Care Directive or Living Will: Discussed advance care planning with patient; however, patient declined at this time.    HPI  ENT Symptoms             Symptoms: cc Present Absent Comment   Fever/Chills   x    Fatigue  x     Muscle Aches   x    Eye Irritation  x     Sneezing   x    Nasal Orion/Drg   x    Sinus Pressure/Pain  x     Loss of smell   x    Dental pain   x    Sore Throat   x    Swollen Glands   x    Ear Pain/Fullness  x  Left ear popping   Cough   x    Wheeze   x    Chest Pain   x    Shortness of breath   x    Rash   x    Other   x Denies GI sx     Symptom duration:  Eye problem (left eye) 10 days ago, stopped drops on 3/14 and then had to restart drops because on 3/18/2024 right eye drainage again   Symptom severity:  mild   Treatments tried:  Polymixin eye drops have been helping   Contacts:  Both kids GI sx last week          Left middle finger  Pain and stiffness x several weeks  Mostly at night     Hypothyroidism Follow-up    Since last visit, patient describes the following symptoms: Weight stable, no hair loss, no skin changes, no " constipation, no loose stools    Has been feeling more fatigued and lethargic over the last 4 months or so  Weight has been stuck         3/20/2024   General Health   How would you rate your overall physical health? Good   Feel stress (tense, anxious, or unable to sleep) Not at all         3/20/2024   Nutrition   Three or more servings of calcium each day? Yes   Diet: Regular (no restrictions)   How many servings of fruit and vegetables per day? (!) 2-3   How many sweetened beverages each day? 0-1         3/20/2024   Exercise   Days per week of moderate/strenous exercise 5 days   Average minutes spent exercising at this level 30 min         3/20/2024   Social Factors   Frequency of gathering with friends or relatives Once a week   Worry food won't last until get money to buy more No   Food not last or not have enough money for food? No   Do you have housing?  Yes   Are you worried about losing your housing? No   Lack of transportation? No   Unable to get utilities (heat,electricity)? No         3/20/2024   Dental   Dentist two times every year? Yes         3/20/2024   TB Screening   Were you born outside of the [place]? No         Today's PHQ-2 Score:       3/20/2024     7:24 AM   PHQ-2 ( 1999 [employer])   Q1: Little interest or pleasure in doing things 0   Q2: Feeling down, depressed or hopeless 0   PHQ-2 Score 0   Q1: Little interest or pleasure in doing things Not at all   Q2: Feeling down, depressed or hopeless Not at all   PHQ-2 Score 0           3/20/2024   Substance Use   Alcohol more than 3/day or more than 7/wk No   Do you use any other substances recreationally? No     Social History     Tobacco Use    Smoking status: Never    Smokeless tobacco: Never   Vaping Use    Vaping Use: Never used   Substance Use Topics    Alcohol use: Yes     Comment: rare-quit with pregnancy    Drug use: No             3/20/2024   Breast Cancer Screening   Family history of breast, colon, or ovarian cancer? Yes         3/20/2024   LAST  FHS-7 RESULTS   1st degree relative breast or ovarian cancer No   Any relative bilateral breast cancer No   Any male have breast cancer No   Any ONE woman have BOTH breast AND ovarian cancer No   Any woman with breast cancer before 50yrs No   2 or more relatives with breast AND/OR ovarian cancer No   2 or more relatives with breast AND/OR bowel cancer No        Mammogram Screening - Patient under 40 years of age: Routine Mammogram Screening not recommended.         3/20/2024   STI Screening   New sexual partner(s) since last STI/HIV test? No     History of abnormal Pap smear: Status post benign hysterectomy. Health Maintenance and Surgical History updated.        Latest Ref Rng & Units 2018     9:54 AM 2018     9:45 AM   PAP / HPV   PAP (Historical)  NIL     HPV 16 DNA NEG^Negative  Negative    HPV 18 DNA NEG^Negative  Negative    Other HR HPV NEG^Negative  Negative           Reviewed and updated as needed this visit by Provider                    Past Medical History:   Diagnosis Date    Chickenpox     Dysmenorrhea 2020    Added automatically from request for surgery [id number]    Excessive bleeding in premenopausal period 2020    Added automatically from request for surgery [id number]    PONV (postoperative nausea and vomiting)      Past Surgical History:   Procedure Laterality Date     SECTION N/A 2019    Procedure:  SECTION;  Surgeon: Francis Somers MD;  Location: [facility] OR     SECTION, TUBAL LIGATION, COMBINED Bilateral 2020    Procedure: REPEAT  SECTION;  Surgeon: Lesa Courtney MD;  Location: [facility] OR    GYN SURGERY      HYSTERECTOMY, PAP NO LONGER INDICATED  2021    dysmenorrhea, menorrhagia    LAPAROSCOPIC HYSTERECTOMY TOTAL N/A 2021    Procedure: total laparoscopic hysterectomy, Bilateral Salpingectomy;  Surgeon: Maida Fair MD;  Location: [facility] OR     OB History    Para Term  AB Living   2 2 2 0 0 2   SAB  "IAB Ectopic Multiple Live Births   0 0 0 0 2      # Outcome Date GA Lbr Benigno/2nd Weight Sex Delivery Anes PTL Lv   2 Term 11/11/20 38w5d  3.74 kg (8 lb 3.9 oz) M   N ANÍBAL      Name: HELADIO PATTON      Apgar1: 7  Apgar5: 9   1 Term 02/21/19 37w5d  3.289 kg (7 lb 4 oz) M CS-LTranv EPI N ANÍBAL      Birth Comments: NP in attendance at delivery for failure to progress and category 2 tracing with fetal tachycardia and minimal variability. ROM for >24hrs. Infant was placed on maternal abdomen for delayed cord clamping. Cried immediately. Brought to warmer and was dried/stimulated. Initially lung sounds coarse. Apgars 7/9. Lungs cleared quickly. At ~15 minutes of life infant became tachycardic to 200 (previously 160's) and began grunting intermittently. RR 80's. O2 sats within goal range throughout. CPAP done for ~15 minutes. Delee suctioned x2 for ~2mL thick clear secretions. HR improved to 150-180's. Continued to grunt intermittently. Brought back to special care nursery for further care.       Complications: Cephalopelvic Disproportion, Failure to Progress in First Stage, Cholestasis (H28)      Name: Elias      Apgar1: 7  Apgar5: 9         Review of Systems  Constitutional, neuro, ENT, endocrine, pulmonary, cardiac, gastrointestinal, genitourinary, musculoskeletal, integument and psychiatric systems are negative, except as otherwise noted.     Objective    Exam  /73   Pulse 68   Temp 97.8  F (36.6  C)   Resp 18   Ht 1.708 m (5' 7.25\")   Wt 100.8 kg (222 lb 3.2 oz)   LMP 02/14/2020 (Approximate)   SpO2 98%   Breastfeeding No   BMI 34.54 kg/m     Estimated body mass index is 34.54 kg/m  as calculated from the following:    Height as of this encounter: 1.708 m (5' 7.25\").    Weight as of this encounter: 100.8 kg (222 lb 3.2 oz).    Physical Exam  GENERAL: alert and no distress  EYES: Eyes grossly normal to inspection, PERRL and conjunctivae and sclerae normal  HENT: ear canals and TM's normal, nose and " mouth without ulcers or lesions  NECK: no adenopathy, no asymmetry, masses, or scars  RESP: lungs clear to auscultation - no rales, rhonchi or wheezes  BREAST: normal without masses, tenderness or nipple discharge and no palpable axillary masses or adenopathy  CV: regular rate and rhythm, normal S1 S2, no S3 or S4, no murmur, click or rub, no peripheral edema  ABDOMEN: soft, nontender, no hepatosplenomegaly, no masses and bowel sounds normal  MS: no gross musculoskeletal defects noted, no edema  SKIN: no suspicious lesions or rashes  NEURO: Normal strength and tone, mentation intact and speech normal  PSYCH: mentation appears normal, affect normal/bright        Signed Electronically by: Federica Hough, DNP, APRN-CNP       Chart documentation with Dragon Voice recognition Software. Although reviewed after completion, some words and grammatical errors may remain.

## 2024-06-03 ENCOUNTER — DOCUMENTATION ONLY (OUTPATIENT)
Dept: FAMILY MEDICINE | Facility: CLINIC | Age: 29
End: 2024-06-03
Payer: COMMERCIAL

## 2024-06-03 NOTE — PROGRESS NOTES
Your patient is scheduled for a wet prep lab appt on 6-7-24. Please place orders or have your team reach out to have her schedule with you. Thank you.    Ophelia Ortiz on 6/3/2024 at 10:20 AM

## 2024-08-06 ENCOUNTER — VIRTUAL VISIT (OUTPATIENT)
Dept: FAMILY MEDICINE | Facility: CLINIC | Age: 29
End: 2024-08-06
Payer: COMMERCIAL

## 2024-08-06 DIAGNOSIS — N94.9 VAGINAL SYMPTOM: Primary | ICD-10-CM

## 2024-08-06 PROCEDURE — 99441 PR PHYSICIAN TELEPHONE EVALUATION 5-10 MIN: CPT | Mod: 93 | Performed by: NURSE PRACTITIONER

## 2024-08-06 NOTE — PATIENT INSTRUCTIONS
Vaginal symptom  Patient has been having on and off symptoms for the past 3 months of increased vaginal itching, burning sensation without discharge or odor.  Has been using over-the-counter miconazole and other treatments with some improvement however symptoms continue to return.  Has had both bacterial and yeast infections in the distant past.  Recommend patient schedule lab only appointment for wet prep to further evaluate to determine treatment.  Did discuss preventative measures of both yeast and bacterial infections.  Will notify patient of results and any further recommendations.  - Wet prep - lab collect; Future

## 2024-08-06 NOTE — PROGRESS NOTES
Dalila is a 29 year old who is being evaluated via a billable telephone visit.    How would you like to obtain your AVS? MyChart  If the video visit is dropped, the invitation should be resent by: Text to cell phone: [phone]  Will anyone else be joining your video visit? No  Originating Location (pt. Location): Home    Distant Location (provider location):  Off-site    4:27 PM  - 4:36 PM     Assessment & Plan     Vaginal symptom  Patient has been having on and off symptoms for the past 3 months of increased vaginal itching, burning sensation without discharge or odor.  Has been using over-the-counter miconazole and other treatments with some improvement however symptoms continue to return.  Has had both bacterial and yeast infections in the distant past.  Recommend patient schedule lab only appointment for wet prep to further evaluate to determine treatment.  Did discuss preventative measures of both yeast and bacterial infections.  Will notify patient of results and any further recommendations.  - Wet prep - lab collect; Future            See Patient Instructions    Subjective   Dalila is a 29 year old, presenting for the following health issues:  Vaginal Problem        8/6/2024     4:15 PM   Additional Questions   Roomed by kristla hermosillo cma     HPI     Vaginal Symptoms  Onset/Duration: 3 months  Description:  Vaginal Discharge: none   Itching (Pruritis): YES  Burning sensation:  YES  Odor: No  Accompanying Signs & Symptoms:  Urinary symptoms: No  Abdominal pain: No  Fever: No  History:   Sexually active: YES  New Partner: No  Possibility of Pregnancy:  No  Recent antibiotic use: No  Previous vaginitis issues: YES  Precipitating or alleviating factors: None  Therapies tried and outcome: none and miconazole cream, cystex over the counter      Seems to come and go  Doesn't think it's a yeast      Review of Systems  Constitutional, HEENT, cardiovascular, pulmonary, gi and gu systems are negative, except as otherwise  noted.      Objective           Vitals:  No vitals were obtained today due to virtual visit.    Physical Exam   General: Alert and no distress //Respiratory: No audible wheeze, cough, or shortness of breath // Psychiatric:  Appropriate affect, tone, and pace of words      Diagnostic Test Results:  Labs reviewed in Epic  Pending        Phone call duration: 9 minutes  Signed Electronically by: France Alcala DNP,, AP CNP      Chart documentation with Dragon Voice recognition Software. Although reviewed after completion, some words and grammatical errors may remain.

## 2024-08-06 NOTE — NURSING NOTE
"Chief Complaint   Patient presents with    Vaginal Problem       Initial LMP 02/14/2020 (Approximate)  Estimated body mass index is 34.54 kg/m  as calculated from the following:    Height as of 3/20/24: 1.708 m (5' 7.25\").    Weight as of 3/20/24: 100.8 kg (222 lb 3.2 oz).    Patient presents to the clinic using No DME    Is there anyone who you would like to be able to receive your results? No  If yes have patient fill out SHOLA      "

## 2024-08-07 ENCOUNTER — LAB (OUTPATIENT)
Dept: LAB | Facility: CLINIC | Age: 29
End: 2024-08-07
Payer: COMMERCIAL

## 2024-08-07 DIAGNOSIS — N76.0 BV (BACTERIAL VAGINOSIS): Primary | ICD-10-CM

## 2024-08-07 DIAGNOSIS — B37.31 YEAST INFECTION OF THE VAGINA: ICD-10-CM

## 2024-08-07 DIAGNOSIS — N94.9 VAGINAL SYMPTOM: ICD-10-CM

## 2024-08-07 DIAGNOSIS — B96.89 BV (BACTERIAL VAGINOSIS): Primary | ICD-10-CM

## 2024-08-07 PROCEDURE — 87210 SMEAR WET MOUNT SALINE/INK: CPT

## 2024-08-07 RX ORDER — FLUCONAZOLE 150 MG/1
150 TABLET ORAL ONCE
Qty: 1 TABLET | Refills: 0 | Status: SHIPPED | OUTPATIENT
Start: 2024-08-07 | End: 2024-08-07

## 2024-08-07 RX ORDER — METRONIDAZOLE 500 MG/1
500 TABLET ORAL 2 TIMES DAILY
Qty: 14 TABLET | Refills: 0 | Status: SHIPPED | OUTPATIENT
Start: 2024-08-07 | End: 2024-08-14

## 2024-08-26 DIAGNOSIS — E03.8 SUBCLINICAL HYPOTHYROIDISM: ICD-10-CM

## 2024-08-27 RX ORDER — LEVOTHYROXINE SODIUM 125 UG/1
125 TABLET ORAL DAILY
Qty: 90 TABLET | Refills: 1 | Status: SHIPPED | OUTPATIENT
Start: 2024-08-27

## 2025-01-19 ENCOUNTER — OFFICE VISIT (OUTPATIENT)
Dept: URGENT CARE | Facility: URGENT CARE | Age: 30
End: 2025-01-19
Payer: COMMERCIAL

## 2025-01-19 VITALS
OXYGEN SATURATION: 100 % | WEIGHT: 207 LBS | DIASTOLIC BLOOD PRESSURE: 73 MMHG | SYSTOLIC BLOOD PRESSURE: 108 MMHG | RESPIRATION RATE: 14 BRPM | BODY MASS INDEX: 32.18 KG/M2 | TEMPERATURE: 98.6 F | HEART RATE: 96 BPM

## 2025-01-19 DIAGNOSIS — J02.9 SORETHROAT: Primary | ICD-10-CM

## 2025-01-19 LAB
DEPRECATED S PYO AG THROAT QL EIA: NEGATIVE
S PYO DNA THROAT QL NAA+PROBE: NOT DETECTED

## 2025-01-19 PROCEDURE — 87651 STREP A DNA AMP PROBE: CPT | Performed by: FAMILY MEDICINE

## 2025-01-19 PROCEDURE — 99213 OFFICE O/P EST LOW 20 MIN: CPT | Performed by: FAMILY MEDICINE

## 2025-01-19 NOTE — PROGRESS NOTES
Assessment & Plan     Sorethroat  Strep neg  Await PCR  - Streptococcus A Rapid Screen w/Reflex to PCR - Clinic Collect  - Group A Streptococcus PCR Throat Swab             No follow-ups on file.    Uriel Araujo MD  [facility]    Brock Conner is a 29 year old female who presents to clinic today for the following health issues:  Chief Complaint   Patient presents with    Pharyngitis     Started on Friday, getting worse. White spots on tonsil everyone is sick with things at home. Hurts to swallow         1/19/2025     1:01 PM   Additional Questions   Roomed by ,luz marina   Accompanied by self     HPI    ST  No fever  Sickness at home        Review of Systems        Objective    /73   Pulse 96   Temp 98.6  F (37  C) (Tympanic)   Resp 14   Wt 93.9 kg (207 lb)   LMP 02/14/2020 (Approximate)   SpO2 100%   BMI 32.18 kg/m    Physical Exam  Vitals and nursing note reviewed.   Constitutional:       Appearance: Normal appearance.   HENT:      Mouth/Throat:      Pharynx: Oropharyngeal exudate and posterior oropharyngeal erythema present.   Cardiovascular:      Rate and Rhythm: Normal rate and regular rhythm.      Pulses: Normal pulses.      Heart sounds: Normal heart sounds.   Lymphadenopathy:      Cervical: Cervical adenopathy present.   Neurological:      Mental Status: She is alert.

## 2025-02-16 ENCOUNTER — OFFICE VISIT (OUTPATIENT)
Dept: URGENT CARE | Facility: URGENT CARE | Age: 30
End: 2025-02-16
Payer: COMMERCIAL

## 2025-02-16 VITALS
SYSTOLIC BLOOD PRESSURE: 116 MMHG | BODY MASS INDEX: 32.18 KG/M2 | WEIGHT: 207 LBS | OXYGEN SATURATION: 98 % | DIASTOLIC BLOOD PRESSURE: 74 MMHG | HEART RATE: 73 BPM | TEMPERATURE: 98.1 F | RESPIRATION RATE: 16 BRPM

## 2025-02-16 DIAGNOSIS — J02.0 STREP THROAT: ICD-10-CM

## 2025-02-16 DIAGNOSIS — R07.0 THROAT PAIN: Primary | ICD-10-CM

## 2025-02-16 LAB — DEPRECATED S PYO AG THROAT QL EIA: POSITIVE

## 2025-02-16 PROCEDURE — 87880 STREP A ASSAY W/OPTIC: CPT | Performed by: NURSE PRACTITIONER

## 2025-02-16 PROCEDURE — 99213 OFFICE O/P EST LOW 20 MIN: CPT | Performed by: NURSE PRACTITIONER

## 2025-02-16 RX ORDER — AMOXICILLIN 500 MG/1
500 CAPSULE ORAL 2 TIMES DAILY
Qty: 20 CAPSULE | Refills: 0 | Status: SHIPPED | OUTPATIENT
Start: 2025-02-16 | End: 2025-02-26

## 2025-02-16 NOTE — PROGRESS NOTES
ASSESSMENT:      PLAN:       SUBJECTIVE:  Dalila Galvez is a 29 year old female with a chief complaint of sore throat.    Onset of symptoms was 5 day(s) ago.    Course of illness: sudden onset.  Severity moderate  Current and Associated symptoms: none  Treatment measures tried include Tylenol/Ibuprofen.  Predisposing factors include ill contact: Family member .    Past Medical History:   Diagnosis Date    Chickenpox     Dysmenorrhea 11/20/2020    Added automatically from request for surgery [id number]    Excessive bleeding in premenopausal period 11/20/2020    Added automatically from request for surgery [id number]    PONV (postoperative nausea and vomiting)      Current Outpatient Medications   Medication Sig Dispense Refill    levothyroxine (SYNTHROID/LEVOTHROID) 125 MCG tablet TAKE 1 TABLET (125 MCG) BY MOUTH DAILY 90 tablet 1     Social History     Tobacco Use    Smoking status: Never    Smokeless tobacco: Never   Substance Use Topics    Alcohol use: Yes     Comment: rare-quit with pregnancy       OBJECTIVE:   /74 (BP Location: Right arm, Patient Position: Sitting, Cuff Size: Adult Large)   Pulse 73   Temp 98.1  F (36.7  C) (Tympanic)   Resp 16   Wt 93.9 kg (207 lb)   LMP 02/14/2020 (Approximate)   SpO2 98%   BMI 32.18 kg/m    NO DYSPHONIA  GENERAL APPEARANCE: healthy, alert and no distress  EYES: EOMI, conjunctiva clear  HENT: ear canals and TM's normal.  Nose normal.  Pharynx erythematous with some exudate noted.  RESP:normal respiration  CV: adequately perfused  SKIN: no suspicious lesions or rashes    Rapid Strep test is positive

## 2025-02-18 ENCOUNTER — PATIENT OUTREACH (OUTPATIENT)
Dept: CARE COORDINATION | Facility: CLINIC | Age: 30
End: 2025-02-18
Payer: COMMERCIAL

## 2025-03-03 ENCOUNTER — MYC REFILL (OUTPATIENT)
Dept: FAMILY MEDICINE | Facility: CLINIC | Age: 30
End: 2025-03-03
Payer: COMMERCIAL

## 2025-03-03 DIAGNOSIS — E03.8 SUBCLINICAL HYPOTHYROIDISM: ICD-10-CM

## 2025-03-03 RX ORDER — LEVOTHYROXINE SODIUM 125 UG/1
125 TABLET ORAL DAILY
Qty: 90 TABLET | Refills: 0 | Status: SHIPPED | OUTPATIENT
Start: 2025-03-03

## 2025-03-16 SDOH — HEALTH STABILITY: PHYSICAL HEALTH: ON AVERAGE, HOW MANY DAYS PER WEEK DO YOU ENGAGE IN MODERATE TO STRENUOUS EXERCISE (LIKE A BRISK WALK)?: 5 DAYS

## 2025-03-16 SDOH — HEALTH STABILITY: PHYSICAL HEALTH: ON AVERAGE, HOW MANY MINUTES DO YOU ENGAGE IN EXERCISE AT THIS LEVEL?: 30 MIN

## 2025-03-16 ASSESSMENT — SOCIAL DETERMINANTS OF HEALTH (SDOH): HOW OFTEN DO YOU GET TOGETHER WITH FRIENDS OR RELATIVES?: ONCE A WEEK

## 2025-03-17 ENCOUNTER — OFFICE VISIT (OUTPATIENT)
Dept: FAMILY MEDICINE | Facility: CLINIC | Age: 30
End: 2025-03-17
Payer: COMMERCIAL

## 2025-03-17 VITALS
OXYGEN SATURATION: 100 % | DIASTOLIC BLOOD PRESSURE: 68 MMHG | HEART RATE: 92 BPM | TEMPERATURE: 98 F | BODY MASS INDEX: 32.18 KG/M2 | HEIGHT: 67 IN | RESPIRATION RATE: 14 BRPM | WEIGHT: 205 LBS | SYSTOLIC BLOOD PRESSURE: 96 MMHG

## 2025-03-17 DIAGNOSIS — B37.31 YEAST INFECTION OF THE VAGINA: ICD-10-CM

## 2025-03-17 DIAGNOSIS — N76.0 BV (BACTERIAL VAGINOSIS): Primary | ICD-10-CM

## 2025-03-17 DIAGNOSIS — E03.8 SUBCLINICAL HYPOTHYROIDISM: ICD-10-CM

## 2025-03-17 DIAGNOSIS — N94.9 VAGINAL SYMPTOM: ICD-10-CM

## 2025-03-17 DIAGNOSIS — E66.01 MORBID OBESITY (H): ICD-10-CM

## 2025-03-17 DIAGNOSIS — B96.89 BV (BACTERIAL VAGINOSIS): Primary | ICD-10-CM

## 2025-03-17 DIAGNOSIS — Z00.00 ROUTINE GENERAL MEDICAL EXAMINATION AT A HEALTH CARE FACILITY: Primary | ICD-10-CM

## 2025-03-17 LAB
ANION GAP SERPL CALCULATED.3IONS-SCNC: 10 MMOL/L (ref 7–15)
BUN SERPL-MCNC: 16.8 MG/DL (ref 6–20)
CALCIUM SERPL-MCNC: 9.7 MG/DL (ref 8.8–10.4)
CHLORIDE SERPL-SCNC: 101 MMOL/L (ref 98–107)
CLUE CELLS: PRESENT
CREAT SERPL-MCNC: 0.79 MG/DL (ref 0.51–0.95)
EGFRCR SERPLBLD CKD-EPI 2021: >90 ML/MIN/1.73M2
GLUCOSE SERPL-MCNC: 89 MG/DL (ref 70–99)
HCO3 SERPL-SCNC: 27 MMOL/L (ref 22–29)
POTASSIUM SERPL-SCNC: 4.3 MMOL/L (ref 3.4–5.3)
SODIUM SERPL-SCNC: 138 MMOL/L (ref 135–145)
TRICHOMONAS, WET PREP: ABNORMAL
TSH SERPL DL<=0.005 MIU/L-ACNC: 1.76 UIU/ML (ref 0.3–4.2)
WBC'S/HIGH POWER FIELD, WET PREP: ABNORMAL
YEAST, WET PREP: PRESENT

## 2025-03-17 PROCEDURE — 1126F AMNT PAIN NOTED NONE PRSNT: CPT | Performed by: NURSE PRACTITIONER

## 2025-03-17 PROCEDURE — 99214 OFFICE O/P EST MOD 30 MIN: CPT | Mod: 25 | Performed by: NURSE PRACTITIONER

## 2025-03-17 PROCEDURE — 3078F DIAST BP <80 MM HG: CPT | Performed by: NURSE PRACTITIONER

## 2025-03-17 PROCEDURE — 99395 PREV VISIT EST AGE 18-39: CPT | Performed by: NURSE PRACTITIONER

## 2025-03-17 PROCEDURE — 3074F SYST BP LT 130 MM HG: CPT | Performed by: NURSE PRACTITIONER

## 2025-03-17 PROCEDURE — 36415 COLL VENOUS BLD VENIPUNCTURE: CPT | Performed by: NURSE PRACTITIONER

## 2025-03-17 PROCEDURE — 87210 SMEAR WET MOUNT SALINE/INK: CPT | Performed by: NURSE PRACTITIONER

## 2025-03-17 PROCEDURE — 84443 ASSAY THYROID STIM HORMONE: CPT | Performed by: NURSE PRACTITIONER

## 2025-03-17 PROCEDURE — 80048 BASIC METABOLIC PNL TOTAL CA: CPT | Performed by: NURSE PRACTITIONER

## 2025-03-17 RX ORDER — METRONIDAZOLE 500 MG/1
500 TABLET ORAL 2 TIMES DAILY
Qty: 14 TABLET | Refills: 0 | Status: SHIPPED | OUTPATIENT
Start: 2025-03-17 | End: 2025-03-24

## 2025-03-17 RX ORDER — LEVOTHYROXINE SODIUM 125 UG/1
125 TABLET ORAL DAILY
Qty: 90 TABLET | Refills: 2 | Status: SHIPPED | OUTPATIENT
Start: 2025-03-17

## 2025-03-17 RX ORDER — FLUCONAZOLE 150 MG/1
150 TABLET ORAL ONCE
Qty: 2 TABLET | Refills: 0 | Status: SHIPPED | OUTPATIENT
Start: 2025-03-17 | End: 2025-03-17

## 2025-03-17 ASSESSMENT — PAIN SCALES - GENERAL: PAINLEVEL_OUTOF10: NO PAIN (0)

## 2025-03-17 NOTE — NURSING NOTE
"Chief Complaint   Patient presents with    Physical     BP 96/68   Pulse 92   Temp 98  F (36.7  C) (Tympanic)   Resp 14   Ht 1.708 m (5' 7.25\")   Wt 93 kg (205 lb)   LMP 02/14/2020 (Approximate)   SpO2 100%   BMI 31.87 kg/m   Estimated body mass index is 31.87 kg/m  as calculated from the following:    Height as of this encounter: 1.708 m (5' 7.25\").    Weight as of this encounter: 93 kg (205 lb).  Patient presents to the clinic using No DME      Health Maintenance that is potentially due pending provider review:    Health Maintenance Due   Topic Date Due    HEPATITIS B IMMUNIZATION (1 of 3 - 19+ 3-dose series) Never done    INFLUENZA VACCINE (1) 09/01/2024    COVID-19 Vaccine (1 - 2024-25 season) Never done    YEARLY PREVENTIVE VISIT  03/20/2025    TSH W/FREE T4 REFLEX  03/20/2025    ANNUAL REVIEW OF HM ORDERS  03/20/2025                  "

## 2025-03-17 NOTE — PROGRESS NOTES
"Preventive Care Visit  [facility]  Federica Hough DNP, Family Medicine  Mar 17, 2025      Assessment & Plan     Routine general medical examination at a health care facility  Very pleasant, relatively healthy 29-year-old female in for routine preventative examination.  - Basic metabolic panel  (Ca, Cl, CO2, Creat, Gluc, K, Na, BUN); Future    Subclinical hypothyroidism  Chronic, has been stable.  Will recheck TSH today, continue current dose without any changes.  - TSH WITH FREE T4 REFLEX; Future  - levothyroxine (SYNTHROID/LEVOTHROID) 125 MCG tablet; Take 1 tablet (125 mcg) by mouth daily.    Morbid obesity (H)  Chronic, improved.  Weight is down 17 pounds since last year.  Has been doing more of a carnivore diet.  Discussed diet and exercise and encourage more of a whole food, plant predominant diet and less meats going forward.    Vaginal symptom  Patient has ongoing, intermittent yeast and bacterial infections.  Feels has something going on currently with increased itching and may be some odor.  Will obtain wet prep today.  - Wet prep - lab collect; Future    Patient has been advised of split billing requirements and indicates understanding: Yes        BMI  Estimated body mass index is 31.87 kg/m  as calculated from the following:    Height as of this encounter: 1.708 m (5' 7.25\").    Weight as of this encounter: 93 kg (205 lb).   Weight management plan: Discussed healthy diet and exercise guidelines    Counseling  Appropriate preventive services were addressed with this patient via screening, questionnaire, or discussion as appropriate for fall prevention, nutrition, physical activity, Tobacco-use cessation, social engagement, weight loss and cognition.  Checklist reviewing preventive services available has been given to the patient.  Reviewed patient's diet, addressing concerns and/or questions.       See Patient Instructions    Subjective   Dalila is a 29 year old, presenting for the " following:  Physical        3/17/2025     7:31 AM   Additional Questions   Roomed by Carmen MEEK   Accompanied by Self         3/17/2025     7:31 AM   Patient Reported Additional Medications   Patient reports taking the following new medications .          HPI       Wt Readings from Last 3 Encounters:   03/17/25 93 kg (205 lb)   02/16/25 93.9 kg (207 lb)   01/19/25 93.9 kg (207 lb)        Hypothyroidism Follow-up    Since last visit, patient describes the following symptoms: Weight stable, no hair loss, no skin changes, no constipation, no loose stools      Bacterial vaginosis/yeast ~ recurrent   Feels has had for a while   More itching, maybe some odor    Advance Care Planning  Patient does not have a Health Care Directive: Discussed advance care planning with patient; information given to patient to review.      3/16/2025   General Health   How would you rate your overall physical health? Good   Feel stress (tense, anxious, or unable to sleep) Not at all         3/16/2025   Nutrition   Three or more servings of calcium each day? Yes   Diet: Regular (no restrictions)   How many servings of fruit and vegetables per day? (!) 0-1   How many sweetened beverages each day? 0-1         3/16/2025   Exercise   Days per week of moderate/strenous exercise 5 days   Average minutes spent exercising at this level 30 min         3/16/2025   Social Factors   Frequency of gathering with friends or relatives Once a week   Worry food won't last until get money to buy more No   Food not last or not have enough money for food? No   Do you have housing? (Housing is defined as stable permanent housing and does not include staying ouside in a car, in a tent, in an abandoned building, in an overnight shelter, or couch-surfing.) Yes   Are you worried about losing your housing? No   Lack of transportation? No   Unable to get utilities (heat,electricity)? No         3/16/2025   Dental   Dentist two times every year? Yes           3/20/2024   TB  Screening   Were you born outside of the [place]? No           Today's PHQ-2 Score:       3/16/2025     8:59 PM   PHQ-2 ( 1999 [employer])   Q1: Little interest or pleasure in doing things 0   Q2: Feeling down, depressed or hopeless 0   PHQ-2 Score 0    Q1: Little interest or pleasure in doing things Not at all   Q2: Feeling down, depressed or hopeless Not at all   PHQ-2 Score 0       Patient-reported           3/16/2025   Substance Use   Alcohol more than 3/day or more than 7/wk No   Do you use any other substances recreationally? No     Social History     Tobacco Use    Smoking status: Never    Smokeless tobacco: Never   Vaping Use    Vaping status: Never Used   Substance Use Topics    Alcohol use: Yes     Comment: rare-quit with pregnancy    Drug use: No           3/20/2024   LAST FHS-7 RESULTS   1st degree relative breast or ovarian cancer No   Any relative bilateral breast cancer No   Any male have breast cancer No   Any ONE woman have BOTH breast AND ovarian cancer No   Any woman with breast cancer before 50yrs No   2 or more relatives with breast AND/OR ovarian cancer No   2 or more relatives with breast AND/OR bowel cancer No        Mammogram Screening - Patient under 40 years of age: Routine Mammogram Screening not recommended.         3/16/2025   STI Screening   New sexual partner(s) since last STI/HIV test? No     History of abnormal Pap smear: Status post hysterectomy with removal of cervix and no history of CIN2 or greater or cervical cancer. Health Maintenance and Surgical History updated.        Latest Ref Rng & Units 8/2/2018     9:54 AM 8/2/2018     9:45 AM   PAP / HPV   PAP (Historical)  NIL     HPV 16 DNA NEG^Negative  Negative    HPV 18 DNA NEG^Negative  Negative    Other HR HPV NEG^Negative  Negative           Reviewed and updated as needed this visit by Provider                    Past Medical History:   Diagnosis Date    Chickenpox     Dysmenorrhea 11/20/2020    Added automatically from request for  surgery [id number]    Excessive bleeding in premenopausal period 2020    Added automatically from request for surgery [id number]    PONV (postoperative nausea and vomiting)      Past Surgical History:   Procedure Laterality Date     SECTION N/A 2019    Procedure:  SECTION;  Surgeon: Francis Somers MD;  Location: [facility]     SECTION, TUBAL LIGATION, COMBINED Bilateral 2020    Procedure: REPEAT  SECTION;  Surgeon: Lesa Courtney MD;  Location: [facility] OR    GYN SURGERY      HYSTERECTOMY, PAP NO LONGER INDICATED  2021    dysmenorrhea, menorrhagia    LAPAROSCOPIC HYSTERECTOMY TOTAL N/A 2021    Procedure: total laparoscopic hysterectomy, Bilateral Salpingectomy;  Surgeon: Maida Fair MD;  Location: [facility] OR     OB History    Para Term  AB Living   2 2 2 0 0 2   SAB IAB Ectopic Multiple Live Births   0 0 0 0 2      # Outcome Date GA Lbr Benigno/2nd Weight Sex Type Anes PTL Lv   2 Term 20 38w5d  3.74 kg (8 lb 3.9 oz) M   N ANÍBAL      Name: HELADIO PATTON      Apgar1: 7  Apgar5: 9   1 Term 19 37w5d  3.289 kg (7 lb 4 oz) M CS-LTranv EPI N ANÍBAL      Birth Comments: NP in attendance at delivery for failure to progress and category 2 tracing with fetal tachycardia and minimal variability. ROM for >24hrs. Infant was placed on maternal abdomen for delayed cord clamping. Cried immediately. Brought to warmer and was dried/stimulated. Initially lung sounds coarse. Apgars 7/9. Lungs cleared quickly. At ~15 minutes of life infant became tachycardic to 200 (previously 160's) and began grunting intermittently. RR 80's. O2 sats within goal range throughout. CPAP done for ~15 minutes. Delee suctioned x2 for ~2mL thick clear secretions. HR improved to 150-180's. Continued to grunt intermittently. Brought back to special care nursery for further care.       Complications: Cephalopelvic Disproportion, Failure to Progress in First Stage, Cholestasis  "(H)      Name: Elias      Apgar1: 7  Apgar5: 9         Review of Systems  Constitutional, neuro, ENT, endocrine, pulmonary, cardiac, gastrointestinal, genitourinary, musculoskeletal, integument and psychiatric systems are negative, except as otherwise noted.     Objective    Exam  BP 96/68   Pulse 92   Temp 98  F (36.7  C) (Tympanic)   Resp 14   Ht 1.708 m (5' 7.25\")   Wt 93 kg (205 lb)   LMP 02/14/2020 (Approximate)   SpO2 100%   BMI 31.87 kg/m     Estimated body mass index is 31.87 kg/m  as calculated from the following:    Height as of this encounter: 1.708 m (5' 7.25\").    Weight as of this encounter: 93 kg (205 lb).    Physical Exam  GENERAL: alert and no distress  EYES: Eyes grossly normal to inspection, PERRL and conjunctivae and sclerae normal  HENT: ear canals and TM's normal, nose and mouth without ulcers or lesions  NECK: no adenopathy, no asymmetry, masses, or scars  RESP: lungs clear to auscultation - no rales, rhonchi or wheezes  BREAST: normal without masses, tenderness or nipple discharge and no palpable axillary masses or adenopathy  CV: regular rate and rhythm, normal S1 S2, no S3 or S4, no murmur, click or rub, no peripheral edema  ABDOMEN: soft, nontender, no hepatosplenomegaly, no masses and bowel sounds normal  MS: no gross musculoskeletal defects noted, no edema  SKIN: no suspicious lesions or rashes  NEURO: Normal strength and tone, mentation intact and speech normal  PSYCH: mentation appears normal, affect normal/bright        Signed Electronically by: Federica Hough, [place]      Chart documentation with Dragon Voice recognition Software. Although reviewed after completion, some words and grammatical errors may remain.   " Tranexamic Acid Counseling:  Patient advised of the small risk of bleeding problems with tranexamic acid. They were also instructed to call if they developed any nausea, vomiting or diarrhea. All of the patient's questions and concerns were addressed.

## 2025-03-17 NOTE — PATIENT INSTRUCTIONS
Subclinical hypothyroidism  Chronic, has been stable.  Will recheck TSH today, continue current dose without any changes.  - TSH WITH FREE T4 REFLEX; Future  - levothyroxine (SYNTHROID/LEVOTHROID) 125 MCG tablet; Take 1 tablet (125 mcg) by mouth daily.    Morbid obesity (H)  Chronic, improved.  Weight is down 17 pounds since last year.  Has been doing more of a carnivore diet.  Discussed diet and exercise and encourage more of a whole food, plant predominant diet and less meats going forward.    Vaginal symptom  Patient has ongoing, intermittent yeast and bacterial infections.  Feels has something going on currently with increased itching and may be some odor.  Will obtain wet prep today.  - Wet prep - lab collect; Future      Patient Education   Preventive Care Advice   This is general advice given by our system to help you stay healthy. However, your care team may have specific advice just for you. Please talk to your care team about your preventive care needs.  Nutrition  Eat 5 or more servings of fruits and vegetables each day.  Try wheat bread, brown rice and whole grain pasta (instead of white bread, rice, and pasta).  Get enough calcium and vitamin D. Check the label on foods and aim for 100% of the RDA (recommended daily allowance).  Lifestyle  Exercise at least 150 minutes each week  (30 minutes a day, 5 days a week).  Do muscle strengthening activities 2 days a week. These help control your weight and prevent disease.  No smoking.  Wear sunscreen to prevent skin cancer.  Have a dental exam and cleaning every 6 months.  Yearly exams  See your health care team every year to talk about:  Any changes in your health.  Any medicines your care team has prescribed.  Preventive care, family planning, and ways to prevent chronic diseases.  Shots (vaccines)   HPV shots (up to age 26), if you've never had them before.  Hepatitis B shots (up to age 59), if you've never had them before.  COVID-19 shot: Get this shot  when it's due.  Flu shot: Get a flu shot every year.  Tetanus shot: Get a tetanus shot every 10 years.  Pneumococcal, hepatitis A, and RSV shots: Ask your care team if you need these based on your risk.  Shingles shot (for age 50 and up)  General health tests  Diabetes screening:  Starting at age 35, Get screened for diabetes at least every 3 years.  If you are younger than age 35, ask your care team if you should be screened for diabetes.  Cholesterol test: At age 39, start having a cholesterol test every 5 years, or more often if advised.  Bone density scan (DEXA): At age 50, ask your care team if you should have this scan for osteoporosis (brittle bones).  Hepatitis C: Get tested at least once in your life.  STIs (sexually transmitted infections)  Before age 24: Ask your care team if you should be screened for STIs.  After age 24: Get screened for STIs if you're at risk. You are at risk for STIs (including HIV) if:  You are sexually active with more than one person.  You don't use condoms every time.  You or a partner was diagnosed with a sexually transmitted infection.  If you are at risk for HIV, ask about PrEP medicine to prevent HIV.  Get tested for HIV at least once in your life, whether you are at risk for HIV or not.  Cancer screening tests  Cervical cancer screening: If you have a cervix, begin getting regular cervical cancer screening tests starting at age 21.  Breast cancer scan (mammogram): If you've ever had breasts, begin having regular mammograms starting at age 40. This is a scan to check for breast cancer.  Colon cancer screening: It is important to start screening for colon cancer at age 45.  Have a colonoscopy test every 10 years (or more often if you're at risk) Or, ask your provider about stool tests like a FIT test every year or Cologuard test every 3 years.  To learn more about your testing options, visit:   .  For help making a decision, visit:   https://bit.ly/hs37300.  Prostate cancer  screening test: If you have a prostate, ask your care team if a prostate cancer screening test (PSA) at age 55 is right for you.  Lung cancer screening: If you are a current or former smoker ages 50 to 80, ask your care team if ongoing lung cancer screenings are right for you.  For informational purposes only. Not to replace the advice of your health care provider. Copyright   2023 [facility]. All rights reserved. Clinically reviewed by the [facility] Transitions Program. [employer] 947027 - REV 01/24.

## 2025-03-23 ENCOUNTER — MYC MEDICAL ADVICE (OUTPATIENT)
Dept: FAMILY MEDICINE | Facility: CLINIC | Age: 30
End: 2025-03-23
Payer: COMMERCIAL

## 2025-03-23 DIAGNOSIS — N94.9 VAGINAL SYMPTOM: Primary | ICD-10-CM

## 2025-03-24 NOTE — TELEPHONE ENCOUNTER
"3/17/25 Wet Prep Note: \"Hi Dalila,     Your wet prep shows both yeast and bacteria again.  I will send through an antibiotic and antifungal for you.  Follow-up if you feel like this is not completely gone.  And also be sure to abstain from intercourse during treatment to ensure full resolution.     Sincerely,  Federica Hough, DNP\"    Please advise on next steps.  Georgie Shaw RN on 3/24/2025 at 12:10 PM    "

## 2025-03-25 ENCOUNTER — LAB (OUTPATIENT)
Dept: LAB | Facility: CLINIC | Age: 30
End: 2025-03-25
Payer: COMMERCIAL

## 2025-03-25 DIAGNOSIS — N94.9 VAGINAL SYMPTOM: ICD-10-CM

## 2025-03-25 LAB
CLUE CELLS: PRESENT
TRICHOMONAS, WET PREP: ABNORMAL
WBC'S/HIGH POWER FIELD, WET PREP: ABNORMAL
YEAST, WET PREP: ABNORMAL

## 2025-03-25 PROCEDURE — 87210 SMEAR WET MOUNT SALINE/INK: CPT

## 2025-03-26 DIAGNOSIS — B96.89 BV (BACTERIAL VAGINOSIS): Primary | ICD-10-CM

## 2025-03-26 DIAGNOSIS — N76.0 BV (BACTERIAL VAGINOSIS): Primary | ICD-10-CM

## 2025-03-26 RX ORDER — CLINDAMYCIN HYDROCHLORIDE 300 MG/1
300 CAPSULE ORAL 2 TIMES DAILY
Qty: 14 CAPSULE | Refills: 0 | Status: SHIPPED | OUTPATIENT
Start: 2025-03-26 | End: 2025-04-02

## 2025-06-05 ENCOUNTER — MYC REFILL (OUTPATIENT)
Dept: FAMILY MEDICINE | Facility: CLINIC | Age: 30
End: 2025-06-05
Payer: COMMERCIAL

## 2025-06-05 DIAGNOSIS — E03.8 SUBCLINICAL HYPOTHYROIDISM: ICD-10-CM

## 2025-06-05 RX ORDER — LEVOTHYROXINE SODIUM 125 UG/1
125 TABLET ORAL DAILY
Qty: 90 TABLET | Refills: 2 | OUTPATIENT
Start: 2025-06-05

## (undated) DEVICE — RETR ELEV / UTERINE MANIPULATOR V-CARE STD CUP  60-6085-100

## (undated) DEVICE — SOL NACL 0.9% IRRIG 1000ML BOTTLE 07138-09

## (undated) DEVICE — SU VICRYL 0 CT-1 36" J946H

## (undated) DEVICE — ENDO TROCAR FIRST ENTRY KII FIOS ADV FIX 05X150MM CFF01

## (undated) DEVICE — LINEN BABY BLANKET 5434

## (undated) DEVICE — ADH FLOSEAL W/HUMAN THROMBIN 5ML W/APPLICATOR TIP ADS201844

## (undated) DEVICE — SU VICRYL 1 CT-1 36" UND J947H

## (undated) DEVICE — DECANTER VIAL 2006S

## (undated) DEVICE — CATH TRAY FOLEY SURESTEP 16FR W/URINE MTR STATLK LF A303416A

## (undated) DEVICE — DRSG ABDOMINAL 07 1/2X8" 7197D

## (undated) DEVICE — SOL WATER IRRIG 1000ML BOTTLE 07139-09

## (undated) DEVICE — STOCKING SLEEVE COMPRESSION CALF MED

## (undated) DEVICE — GLOVE PROTEXIS BLUE W/NEU-THERA 6.0  2D73EB60

## (undated) DEVICE — DRSG TEGADERM 2 3/8X2 3/4" 1624W

## (undated) DEVICE — TUBING SUCTION 12"X1/4" N612

## (undated) DEVICE — SU PLAIN 3-0 CT 27" 852H

## (undated) DEVICE — SYR 03ML 22GA 1.5" ECLIPSE

## (undated) DEVICE — ADHESIVE SWIFTSET 0.8ML OCTYL SS6

## (undated) DEVICE — GOWN LG DISP 9515

## (undated) DEVICE — ESU CORD MONOPOLAR 10'  E0510

## (undated) DEVICE — GLOVE PROTEXIS W/NEU-THERA 7.5  2D73TE75

## (undated) DEVICE — STOCKING SLEEVE COMPRESSION CALF LG

## (undated) DEVICE — Device

## (undated) DEVICE — SOL NACL 0.9% INJ 1000ML BAG 07983-09

## (undated) DEVICE — SU VICRYL 2-0 CT-1 36" UND J945H

## (undated) DEVICE — SYR 10ML LL W/O NDL

## (undated) DEVICE — SUCTION TIP YANKAUER STR K87

## (undated) DEVICE — GOWN IMPERVIOUS SPECIALTY XLG/XLONG 32474

## (undated) DEVICE — FILTER LAPROSHIELD SMOKE EVAC LSF1

## (undated) DEVICE — ADH SKIN CLOSURE PREMIERPRO EXOFIN 1.0ML 3470

## (undated) DEVICE — LABEL MEDICATION SYSTEM  3304

## (undated) DEVICE — PREP DURAPREP 26ML APL 8630

## (undated) DEVICE — ENDO FLOSEAL APPLICATOR 1500181

## (undated) DEVICE — GLOVE PROTEXIS W/NEU-THERA 6.5  2D73TE65

## (undated) DEVICE — BLADE CLIPPER 4406

## (undated) DEVICE — PACK LAPAROSCOPY/PELVISCOPY STD

## (undated) DEVICE — ENDO TROCAR SLEEVE KII ADV FIXATION 05X100MM CFS02

## (undated) DEVICE — DRSG GAUZE 2X2" 8042

## (undated) DEVICE — GLOVE PROTEXIS MICRO 5.5  2D73PM55

## (undated) DEVICE — ANTIFOG SOLUTION W/FOAM PAD 31142527

## (undated) DEVICE — SU CHROMIC 0 BP-1 27" 47T

## (undated) DEVICE — RETR PANNICULUS TRAXI FOR PT POSITIONING PRS-1030

## (undated) DEVICE — ESU HOLSTER PLASTIC DISP E2400

## (undated) DEVICE — SU MONOCRYL 4-0 PS-2 18" UND Y496G

## (undated) DEVICE — SUCTION IRR STRYKERFLOW II W/TIP 250-070-520

## (undated) DEVICE — PREP CHLORAPREP 26ML TINTED ORANGE  260815

## (undated) DEVICE — PACK C-SECTION LF PL15OTA83B

## (undated) DEVICE — SOL NACL 0.9% IRRIG 3000ML BAG 07972-08

## (undated) DEVICE — SU WND CLOSURE VLOC 90 ABS 3-0 18" P-14 VLOCM0124

## (undated) DEVICE — SU MONOCRYL 0 CT-1 27" Y340H

## (undated) DEVICE — DRAPE POUCH INSTRUMENT 3 POCKET 1018L

## (undated) DEVICE — TUBING C02 INSUFFLATION W/FILTER

## (undated) DEVICE — BLADE KNIFE SURG 11 371111

## (undated) DEVICE — PACK LAPAROTOMY CUSTOM LAKES

## (undated) DEVICE — GLOVE PROTEXIS BLUE W/NEU-THERA 6.5  2D73EB65

## (undated) DEVICE — ESU LIGASURE LAPAROSCOPIC BLUNT TIP SEALER 5MMX37CM LF1837

## (undated) DEVICE — SYR 50ML LL W/O NDL 309653

## (undated) DEVICE — NDL INSUFFLATION 13GA 120MM C2201

## (undated) DEVICE — ENDO TROCAR FIRST ENTRY KII FIOS ADV FIX 05X100MM CFF03

## (undated) DEVICE — PREP SKIN SCRUB TRAY 4461A

## (undated) DEVICE — PAD PERI INDIV WRAP 11" 2022

## (undated) DEVICE — GLOVE PROTEXIS BLUE W/NEU-THERA 7.0  2D73EB70

## (undated) RX ORDER — FENTANYL CITRATE-0.9 % NACL/PF 10 MCG/ML
PLASTIC BAG, INJECTION (ML) INTRAVENOUS
Status: DISPENSED
Start: 2020-11-11

## (undated) RX ORDER — PROPOFOL 10 MG/ML
INJECTION, EMULSION INTRAVENOUS
Status: DISPENSED
Start: 2021-01-13

## (undated) RX ORDER — ACETAMINOPHEN 325 MG/1
TABLET ORAL
Status: DISPENSED
Start: 2021-01-13

## (undated) RX ORDER — KETOROLAC TROMETHAMINE 30 MG/ML
INJECTION, SOLUTION INTRAMUSCULAR; INTRAVENOUS
Status: DISPENSED
Start: 2020-11-11

## (undated) RX ORDER — DIMENHYDRINATE 50 MG/ML
INJECTION, SOLUTION INTRAMUSCULAR; INTRAVENOUS
Status: DISPENSED
Start: 2021-01-13

## (undated) RX ORDER — OXYTOCIN/0.9 % SODIUM CHLORIDE 30/500 ML
PLASTIC BAG, INJECTION (ML) INTRAVENOUS
Status: DISPENSED
Start: 2019-02-21

## (undated) RX ORDER — FENTANYL CITRATE 50 UG/ML
INJECTION, SOLUTION INTRAMUSCULAR; INTRAVENOUS
Status: DISPENSED
Start: 2019-02-20

## (undated) RX ORDER — HYDROXYZINE HYDROCHLORIDE 25 MG/1
TABLET, FILM COATED ORAL
Status: DISPENSED
Start: 2021-01-13

## (undated) RX ORDER — DEXAMETHASONE SODIUM PHOSPHATE 4 MG/ML
INJECTION, SOLUTION INTRA-ARTICULAR; INTRALESIONAL; INTRAMUSCULAR; INTRAVENOUS; SOFT TISSUE
Status: DISPENSED
Start: 2019-02-21

## (undated) RX ORDER — MORPHINE SULFATE 1 MG/ML
INJECTION, SOLUTION EPIDURAL; INTRATHECAL; INTRAVENOUS
Status: DISPENSED
Start: 2020-11-11

## (undated) RX ORDER — EPHEDRINE SULFATE 50 MG/ML
INJECTION, SOLUTION INTRAMUSCULAR; INTRAVENOUS; SUBCUTANEOUS
Status: DISPENSED
Start: 2019-02-21

## (undated) RX ORDER — ONDANSETRON 2 MG/ML
INJECTION INTRAMUSCULAR; INTRAVENOUS
Status: DISPENSED
Start: 2019-02-21

## (undated) RX ORDER — LIDOCAINE HYDROCHLORIDE 10 MG/ML
INJECTION, SOLUTION EPIDURAL; INFILTRATION; INTRACAUDAL; PERINEURAL
Status: DISPENSED
Start: 2021-01-13

## (undated) RX ORDER — OXYTOCIN/0.9 % SODIUM CHLORIDE 30/500 ML
PLASTIC BAG, INJECTION (ML) INTRAVENOUS
Status: DISPENSED
Start: 2020-11-11

## (undated) RX ORDER — CEFAZOLIN SODIUM 1 G/3ML
INJECTION, POWDER, FOR SOLUTION INTRAMUSCULAR; INTRAVENOUS
Status: DISPENSED
Start: 2019-02-21

## (undated) RX ORDER — ONDANSETRON 2 MG/ML
INJECTION INTRAMUSCULAR; INTRAVENOUS
Status: DISPENSED
Start: 2021-01-13

## (undated) RX ORDER — DEXAMETHASONE SODIUM PHOSPHATE 4 MG/ML
INJECTION, SOLUTION INTRA-ARTICULAR; INTRALESIONAL; INTRAMUSCULAR; INTRAVENOUS; SOFT TISSUE
Status: DISPENSED
Start: 2020-11-11

## (undated) RX ORDER — OXYCODONE HYDROCHLORIDE 5 MG/1
TABLET ORAL
Status: DISPENSED
Start: 2021-01-13

## (undated) RX ORDER — KETOROLAC TROMETHAMINE 30 MG/ML
INJECTION, SOLUTION INTRAMUSCULAR; INTRAVENOUS
Status: DISPENSED
Start: 2019-02-21

## (undated) RX ORDER — MEPERIDINE HYDROCHLORIDE 50 MG/ML
INJECTION INTRAMUSCULAR; INTRAVENOUS; SUBCUTANEOUS
Status: DISPENSED
Start: 2021-01-13

## (undated) RX ORDER — LIDOCAINE HCL/EPINEPHRINE/PF 2%-1:200K
VIAL (ML) INJECTION
Status: DISPENSED
Start: 2019-02-21

## (undated) RX ORDER — BUPIVACAINE HYDROCHLORIDE 2.5 MG/ML
INJECTION, SOLUTION EPIDURAL; INFILTRATION; INTRACAUDAL
Status: DISPENSED
Start: 2019-02-20

## (undated) RX ORDER — MORPHINE SULFATE 1 MG/ML
INJECTION, SOLUTION EPIDURAL; INTRATHECAL; INTRAVENOUS
Status: DISPENSED
Start: 2019-02-21

## (undated) RX ORDER — BUPIVACAINE HYDROCHLORIDE 7.5 MG/ML
INJECTION, SOLUTION INTRASPINAL
Status: DISPENSED
Start: 2019-02-21

## (undated) RX ORDER — ONDANSETRON 2 MG/ML
INJECTION INTRAMUSCULAR; INTRAVENOUS
Status: DISPENSED
Start: 2020-11-11

## (undated) RX ORDER — BUPIVACAINE HYDROCHLORIDE 2.5 MG/ML
INJECTION, SOLUTION INFILTRATION; PERINEURAL
Status: DISPENSED
Start: 2021-01-13

## (undated) RX ORDER — PHENAZOPYRIDINE HYDROCHLORIDE 200 MG/1
TABLET, FILM COATED ORAL
Status: DISPENSED
Start: 2021-01-13

## (undated) RX ORDER — KETOROLAC TROMETHAMINE 30 MG/ML
INJECTION, SOLUTION INTRAMUSCULAR; INTRAVENOUS
Status: DISPENSED
Start: 2021-01-13

## (undated) RX ORDER — GLYCOPYRROLATE 0.2 MG/ML
INJECTION, SOLUTION INTRAMUSCULAR; INTRAVENOUS
Status: DISPENSED
Start: 2021-01-13

## (undated) RX ORDER — CEFAZOLIN SODIUM 2 G/100ML
INJECTION, SOLUTION INTRAVENOUS
Status: DISPENSED
Start: 2021-01-13

## (undated) RX ORDER — DEXAMETHASONE SODIUM PHOSPHATE 4 MG/ML
INJECTION, SOLUTION INTRA-ARTICULAR; INTRALESIONAL; INTRAMUSCULAR; INTRAVENOUS; SOFT TISSUE
Status: DISPENSED
Start: 2021-01-13

## (undated) RX ORDER — FENTANYL CITRATE 50 UG/ML
INJECTION, SOLUTION INTRAMUSCULAR; INTRAVENOUS
Status: DISPENSED
Start: 2021-01-13

## (undated) RX ORDER — FENTANYL CITRATE-0.9 % NACL/PF 10 MCG/ML
PLASTIC BAG, INJECTION (ML) INTRAVENOUS
Status: DISPENSED
Start: 2021-01-13

## (undated) RX ORDER — OXYTOCIN 10 [USP'U]/ML
INJECTION, SOLUTION INTRAMUSCULAR; INTRAVENOUS
Status: DISPENSED
Start: 2019-02-21